# Patient Record
Sex: MALE
[De-identification: names, ages, dates, MRNs, and addresses within clinical notes are randomized per-mention and may not be internally consistent; named-entity substitution may affect disease eponyms.]

---

## 2019-08-10 ENCOUNTER — HOSPITAL ENCOUNTER (INPATIENT)
Dept: HOSPITAL 88 - ER | Age: 69
LOS: 3 days | Discharge: HOME | DRG: 670 | End: 2019-08-13
Attending: INTERNAL MEDICINE | Admitting: INTERNAL MEDICINE
Payer: MEDICARE

## 2019-08-10 VITALS — HEIGHT: 64 IN | BODY MASS INDEX: 34.49 KG/M2 | WEIGHT: 202 LBS

## 2019-08-10 DIAGNOSIS — Z90.79: ICD-10-CM

## 2019-08-10 DIAGNOSIS — I89.8: ICD-10-CM

## 2019-08-10 DIAGNOSIS — N30.41: Primary | ICD-10-CM

## 2019-08-10 DIAGNOSIS — I10: ICD-10-CM

## 2019-08-10 DIAGNOSIS — E11.9: ICD-10-CM

## 2019-08-10 DIAGNOSIS — E78.5: ICD-10-CM

## 2019-08-10 DIAGNOSIS — Z85.46: ICD-10-CM

## 2019-08-10 LAB
ALBUMIN SERPL-MCNC: 2.9 G/DL (ref 3.5–5)
ALBUMIN/GLOB SERPL: 0.6 {RATIO} (ref 0.8–2)
ALP SERPL-CCNC: 70 IU/L (ref 40–150)
ALT SERPL-CCNC: 13 IU/L (ref 0–55)
ANION GAP SERPL CALC-SCNC: 17.6 MMOL/L (ref 8–16)
BACTERIA URNS QL MICRO: (no result) /HPF
BASOPHILS # BLD AUTO: 0 10*3/UL (ref 0–0.1)
BASOPHILS NFR BLD AUTO: 0.2 % (ref 0–1)
BILIRUB UR QL: NEGATIVE
BUN SERPL-MCNC: 15 MG/DL (ref 7–26)
BUN/CREAT SERPL: 15 (ref 6–25)
CALCIUM SERPL-MCNC: 9.4 MG/DL (ref 8.4–10.2)
CHLORIDE SERPL-SCNC: 101 MMOL/L (ref 98–107)
CLARITY UR: (no result)
CO2 SERPL-SCNC: 23 MMOL/L (ref 22–29)
COLOR UR: YELLOW
DEPRECATED NEUTROPHILS # BLD AUTO: 6.7 10*3/UL (ref 2.1–6.9)
DEPRECATED RBC URNS MANUAL-ACNC: >50 /HPF (ref 0–5)
EGFRCR SERPLBLD CKD-EPI 2021: > 60 ML/MIN (ref 60–?)
EOSINOPHIL # BLD AUTO: 0.3 10*3/UL (ref 0–0.4)
EOSINOPHIL NFR BLD AUTO: 3.6 % (ref 0–6)
EPI CELLS URNS QL MICRO: (no result) /LPF
ERYTHROCYTE [DISTWIDTH] IN CORD BLOOD: 14.8 % (ref 11.7–14.4)
GLOBULIN PLAS-MCNC: 4.9 G/DL (ref 2.3–3.5)
GLUCOSE SERPLBLD-MCNC: 98 MG/DL (ref 74–118)
HCT VFR BLD AUTO: 34.4 % (ref 38.2–49.6)
HGB BLD-MCNC: 11.1 G/DL (ref 14–18)
KETONES UR QL STRIP.AUTO: NEGATIVE
LEUKOCYTE ESTERASE UR QL STRIP.AUTO: NEGATIVE
LYMPHOCYTES # BLD: 0.7 10*3/UL (ref 1–3.2)
LYMPHOCYTES NFR BLD AUTO: 8 % (ref 18–39.1)
MCH RBC QN AUTO: 27.3 PG (ref 28–32)
MCHC RBC AUTO-ENTMCNC: 32.3 G/DL (ref 31–35)
MCV RBC AUTO: 84.7 FL (ref 81–99)
MONOCYTES # BLD AUTO: 1 10*3/UL (ref 0.2–0.8)
MONOCYTES NFR BLD AUTO: 11.2 % (ref 4.4–11.3)
NEUTS SEG NFR BLD AUTO: 76.3 % (ref 38.7–80)
NITRITE UR QL STRIP.AUTO: NEGATIVE
PLATELET # BLD AUTO: 403 X10E3/UL (ref 140–360)
POTASSIUM SERPL-SCNC: 3.6 MMOL/L (ref 3.5–5.1)
PROT UR QL STRIP.AUTO: (no result)
RBC # BLD AUTO: 4.06 X10E6/UL (ref 4.3–5.7)
SODIUM SERPL-SCNC: 138 MMOL/L (ref 136–145)
SP GR UR STRIP: 1.02 (ref 1.01–1.02)
UROBILINOGEN UR STRIP-MCNC: 0.2 MG/DL (ref 0.2–1)
WBC #/AREA URNS HPF: (no result) /HPF (ref 0–5)

## 2019-08-10 PROCEDURE — 85730 THROMBOPLASTIN TIME PARTIAL: CPT

## 2019-08-10 PROCEDURE — 74450 X-RAY URETHRA/BLADDER: CPT

## 2019-08-10 PROCEDURE — 83735 ASSAY OF MAGNESIUM: CPT

## 2019-08-10 PROCEDURE — 85610 PROTHROMBIN TIME: CPT

## 2019-08-10 PROCEDURE — 74420 UROGRAPHY RTRGR +-KUB: CPT

## 2019-08-10 PROCEDURE — 81001 URINALYSIS AUTO W/SCOPE: CPT

## 2019-08-10 PROCEDURE — 87086 URINE CULTURE/COLONY COUNT: CPT

## 2019-08-10 PROCEDURE — 36415 COLL VENOUS BLD VENIPUNCTURE: CPT

## 2019-08-10 PROCEDURE — 88305 TISSUE EXAM BY PATHOLOGIST: CPT

## 2019-08-10 PROCEDURE — 80048 BASIC METABOLIC PNL TOTAL CA: CPT

## 2019-08-10 PROCEDURE — 82948 REAGENT STRIP/BLOOD GLUCOSE: CPT

## 2019-08-10 PROCEDURE — 99284 EMERGENCY DEPT VISIT MOD MDM: CPT

## 2019-08-10 PROCEDURE — 80053 COMPREHEN METABOLIC PANEL: CPT

## 2019-08-10 PROCEDURE — 74177 CT ABD & PELVIS W/CONTRAST: CPT

## 2019-08-10 PROCEDURE — 85025 COMPLETE CBC W/AUTO DIFF WBC: CPT

## 2019-08-10 PROCEDURE — 88313 SPECIAL STAINS GROUP 2: CPT

## 2019-08-10 NOTE — DIAGNOSTIC IMAGING REPORT
EXAM: CT Abdomen and Pelvis WITH contrast  

INDICATION: Blood in urine. Painful urination.

COMPARISON: None.

TECHNIQUE: Abdomen and pelvis were scanned utilizing a multidetector helical

scanner from the lung base to the pubic symphysis after administration of IV

contrast. Coronal and sagittal reformations were obtained. Routine protocol was

performed. Scan was performed when during portal venous phase.

            IV CONTRAST: 100 cc Isovue-300

            ORAL CONTRAST: Water

            RADIATION DOSE: Total DLP: 712.11 mGy*cm

             Estimated effective dose: (DLP x 0.015 x size factor) mSv

            COMPLICATIONS: None



FINDINGS:



LINES and TUBES: None.



LOWER THORAX:  6 mm groundglass nodule in the right middle lobe on image 1

series 2. Bibasilar dependent atelectasis. 8 mm noncalcified subpleural nodule

in the lingula on images 6 series 2. Minimal lingular atelectasis on image 6

series 2.



HEPATOBILIARY:      No focal hepatic lesions. No biliary ductal dilation. 



GALLBLADDER: No radio-opaque stones or sludge.  No wall thickening.



SPLEEN: No splenomegaly. 



PANCREAS: No focal masses or ductal dilatation.  



ADRENALS: No adrenal nodules    



KIDNEYS/URETERS: Kidneys enhance symmetrically.  No hydronephrosis. 3.6 cm cyst

in the lower pole of the left kidney associated with punctate calcification. 

No stones.



GI TRACT: No abnormal distention, wall thickening, or evidence of bowel

obstruction.       Appendix is normal.



PELVIC ORGANS/BLADDER: There is diffuse asymmetric wall thickening of the

urinary bladder, associated with perivesicular fat stranding, which may reflect

cystitis in the proper clinical setting.



LYMPH NODES: No lymphadenopathy.



VESSELS: There is mild atherosclerotic disease in the aorta and major arterial

branches.



PERITONEUM / RETROPERITONEUM: No free air or fluid.



BONES: Bilateral small fat-containing inguinal hernias.



SOFT TISSUES: There is a low-attenuation fluid collection within the right

iliac is muscle with mild surrounding peripheral enhancement measuring 4.9 x

2.8 cm on image 73 series 2 which may represent an abscess.



IMPRESSION: 

1.  Findings consistent with cystitis in the proper clinical setting. Recommend

follow-up after treatment to document resolution and adnexal the underlying

pathology such as neoplasm.

2.  4.9 cm mildly peripherally enhancing collection within the right iliac is

muscle may represent an abscess. Bursal fluid is felt to be less likely.

3.  Minimally complex cyst in the lower pole of the left knee.



Signed by: Dr. LUIS ARMANDO Cedeno M.D. on 8/10/2019 11:08 PM

## 2019-08-10 NOTE — XMS REPORT
Patient Summary Document

                             Created on: 08/10/2019



ABE JEREZ

External Reference #: [id number]

: 1950

Sex: Male



Demographics







                          Address                   [address] [place]  [address]

 

                          Home Phone                [phone]

 

                          Preferred Language        Unknown

 

                          Marital Status            Unknown

 

                          Latter-day Affiliation     Unknown

 

                          Race                      Unknown

 

                          Ethnic Group              Unknown





Author







                          Author                    [facility]

 

                          Address                   Unknown

 

                          Phone                     Unavailable







Care Team Providers







                    Care Team Member Name    Role                Phone

 

                          Unavailable               Unavailable







Problems

This patient has no known problems.



Allergies, Adverse Reactions, Alerts

This patient has no known allergies or adverse reactions.



Medications

This patient has no known medications.



Results







           Test Description    Test Time    Test Comments    Text Results    Atomic Results    Result

 Comments

 

                NM BONE SCAN WHOLE BODY    2018 14:41:14                    CLINICAL INDICATION:   dx: c61,

 r/o mets, prostate ca, asymptomaticMODALITY:  [employer] dual head gamma 
cameraTECHNIQUE:  25 mCi Tc 99m MDP are injected IV. After a suitable time 
delay, whole body imaging images are obtained.FINDINGS:COMPARISON:   Multi para 
metric prostate MRI.Symmetric bilateral renal function is observed.Periodontal 
uptake is noted within the maxilla and right mandible.There are mild to moderate
arthritic changes seen at the acromioclavicular, glenohumeral, sternoclavicular,
wrist, medial knee compartments bilaterally. Mild to moderate thoracic 
spondylosis is seen. Facet arthritic changes are noted left L4-5 and right L5-S1
facet. Focal uptake is present at the dorsal left calcaneus.No lytic or blastic 
osseous metastasis is observed.IMPRESSION:Negative for evidence of osteoblastic 
metastatic disease.PQRS 147: 3570F

## 2019-08-10 NOTE — XMS REPORT
Clinical Summary

                             Created on: 08/10/2019



Leland Lacy

External Reference #: [id number]

: 1950

Sex: Male



Demographics







                          Address                   [address] [place]  [address]

 

                          Home Phone                [phone]

 

                          Preferred Language        Unknown

 

                          Marital Status            

 

                          Gnosticism Affiliation     Unknown

 

                          Race                      White

 

                          Ethnic Group              /Latin





Author







                          Author                    [facility]

 

                          Organization              [facility]

 

                          Address                   Unknown

 

                          Phone                     Unavailable







Support







                Name            Relationship    Address         Phone

 

                Dania Mendez    [employer]            Unknown         [phone]







Care Team Providers







                    Care Team Member Name    Role                Phone

 

                    Chapincito Naranjo MD    PCP                 [phone]







Allergies

No Known Allergies



Medications







                          End Date                  Status



              Medication     Sig          Dispensed     Refills      Start  



                                         Date  

 

                                                    Active



                     metFORMIN (GLUCOPHAGE)     Take 1,000 mg       0   



                           500 mg tablet             by mouth 2     



                                         (two) times a     



                                         day with     



                                         meals.     

 

                                                    Active



                     lisinopril          Take 40 mg by       0   



                           (PRINIVIL,ZESTRIL) 10 mg     mouth 2 (two)     



                           tablet                    times a day.     

 

                                                    Active



                     carvedilol (COREG) 12.5     Take 6.25 mg        0   



                           MG tablet                 by mouth 2     



                                         (two) times a     



                                         day with     



                                         meals. 1/2     



                                         tablet..     



                                         018. Dose is     



                                         3.125mg 2 x a     



                                         day     

 

                                                    Active



                     atorvastatin (LIPITOR) 10     Take 20 mg by       0   



                           MG tablet                 mouth     



                                         nightly.     

 

                                                    Active



                     aspirin (ECOTRIN) 81 MG     Take 81 mg by       0   



                           enteric coated tablet     mouth daily.     

 

                                                    Active



                 amLODIPine (NORVASC) 10     TOME HELEN        0               /201  



                     mg tablet           TABLETA TODOS       8  



                                         LOS D?AS     

 

                                                    Active



                 JARDIANCE 25 mg tablet     TOME HELEN        0                 



                           TABLETA TODOS             8  



                                         LOS D?AS  EN     



                                         LA MA?ENDER     

 

                                                    Active



                 GLYXAMBI 25-5 mg tablet     Take 1 tablet      0                 



                           by mouth                  8  



                                         daily.     

 

                                                    Active



                     LANSOPRAZOLE ORAL     Take by             0   



                                         mouth.     

 

                          2018                



              acetaminophen-codeine     Take 1 tablet     30 tablet     0              



                     (TYLENOL WITH CODEINE #3)     by mouth            8  



                           300-30 mg per tablet      every 6 (six)     



                                         hours as     



                                         needed for     



                                         moderate pain     



                                         for up to 10     



                                         days.     

 

                          2018                



              docusate sodium (COLACE)     Take 1       60 capsule     0              



                     100 MG capsule      capsule (100        8  



                                         mg total) by     



                                         mouth 2 (two)     



                                         times a day     



                                         for 30 days.     

 

                          2018                



              ciprofloxacin (CIPRO) 500     Take 1 tablet     8 tablet     0              



                     MG tablet           (500 mg             8  



                                         total) by     



                                         mouth 2 (two)     



                                         times a day     



                                         for 4 days.     



                                         Start taking     



                                         the day     



                                         BEFORE your     



                                         visit for     



                                         catheter     



                                         removal.     

 

                          2018                



              levoFLOXacin (LEVAQUIN)     Take 1 tablet     7 tablet     0              



                     500 MG tablet       (500 mg             8  



                                         total) by     



                                         mouth daily     



                                         for 7 days.     







Active Problems







 



                           Problem                   Noted Date

 

 



                           Prostate cancer           2018







Encounters







                          Care Team                 Description



                     Date                Type                Specialty  

 

                                        



Elise Hearn RN                      



                     2019          Telephone           Oncology  

 

                                        



Chandler Dowlign MD                    



                     10/12/2018          Telephone           Urology  

 

                                        



Chandler Dowling MD                   Prostate cancer (HCC) (Primary Dx)



                     10/10/2018          Office Visit        Urology  

 

                                        



Chandler Dowling MD                   Malignant neoplasm of prostate (Primary Dx)



                     2018          Office Visit        Urology  

 

                                        



Chandler Dowling MD                    



                     2018          Telephone           Urology  

 

                                        



Chandler Dowling MD                    



                     2018          Telephone           Urology  

 

                                        



Chandler Dowling MD                   Prostate cancer (Primary Dx)



                     2018          Office Visit        Urology  

 

                                        



Chandler Dowling MD                    



                     2018          Telephone           Urology  

 

                                        



Chandler Dowling MD                    



                     2018          Telephone           Urology  

 

                                        



Janiya Almanzar                          



                     2018          Patient             Quality  



                                         Outreach   

 

                                        



Chandler Dowling MD                    



                     2018          Telephone           Urology  

 

                                        



Chandler Dowling MD                   ROBOTIC ASSISTED LAPAROSCOPIC  PROSTATECTOMY, BILATERAL PELVIC

 LYMPH NODE DISSECTION, URETHROPEXY



                     2018          Surgery             Urology  

 

                                        



Keanu Kurtz APRN                   



                     2018          Anesthesia          Urology  



                                         Event   

 

                                        



Chandler Dowling MD                   Prostate cancer



                     2018          Hospital            General Internal Medicine  



                           -                         Encounter   



                                         2018    



after 2018



Social History







                                        Date



                 Tobacco Use     Types           Packs/Day       Years Used 

 

                                         



                                         Never Smoker    

 

    



                                         Smokeless Tobacco: Never   



                                         Used   









                                        Tobacco Cessation: Counseling Given: No











   



                 Alcohol Use     Drinks/Week     oz/Week         Comments

 

   



                                         No   









 



                           Sex Assigned at Birth     Date Recorded

 

 



                                         Not on file 









                                        Industry



                           Job Start Date            Occupation 

 

                                        Not on file



                           Not on file               Not on file 









                                        Travel End



                           Travel History            Travel Start 

 





                                         No recent travel history available.







Last Filed Vital Signs







                                        Time Taken



                           Vital Sign                Reading 

 

                                        2018 11:09 AM CDT



                           Blood Pressure            152/79 

 

                                        2018 11:09 AM CDT



                           Pulse                     68 

 

                                        2018 11:09 AM CDT



                           Temperature               35.9 C (96.6 F) 

 

                                        2018 11:09 AM CDT



                           Respiratory Rate          20 

 

                                        2018 11:09 AM CDT



                           Oxygen Saturation         94% 

 

                                        -



                           Inhaled Oxygen            - 



                                         Concentration  

 

                                        2018  6:59 AM CDT



                           Weight                    88.5 kg (195 lb 2 oz) 

 

                                        2018  6:59 AM CDT



                           Height                    167.6 cm (5' 6") 

 

                                        2018  6:59 AM CDT



                           Body Mass Index           31.49 







Plan of Treatment







   



                 Health Maintenance     Due Date        Last Done       Comments

 

   



                           COLONOSCOPY SCREENING     2002  

 

   



                           SHINGLES VACCINES (#1)     2002  

 

   



                           65+ PNEUMOCOCCAL VACCINE     2017  



                                         (1 of 2 - PCV13)   

 

   



                           INFLUENZA VACCINE         2019  







Implants







                    Device Identifier    Shelf Expiration Date    Model / Serial / Lot



                 Implanted       Type            Area            Manufactur   



                                         er   

 

                                        2023          [id number] /

 /

                                        [id number]



                 Clip Ligtng Hem-[employer]-Jonny [employer] Aplr     Surgical        N/A: N/A        [employer] Salomon Lg - [id number]     Implants;           CLOSURE   



                     Implanted: Qty: 2 on 2018 by     [employer];          Chandler Oropeza MD     Extenders;     



                                         Surgical     



                                         Wires     

 

                                        2023          [id number] /

 /

                                        [id number]



                 Clip Ligtng Hem[employer]-Jonny [employer] Aplr     Surgical        N/A: N/A        [facility] [id number]     Implants;           CLOSURE   



                     Implanted: Qty: 1 on 2018 by     [employer];          Chandler Oropeza MD     Extenders;     



                                         Surgical     



                                         Wires     

 

                                        2023          [id number] /

 /

                                        [id number]



                 Clip Ligtng Hem[employer]-Jon[employer] Aplr     Surgical        N/A: N/A        LORENA Salomon Nimesh - [id number]     Implants;           CLOSURE   



                     Implanted: Qty: 1 on 2018 by     [employer];          Chandler Oropeza MD     Extenders;     



                                         Surgical     



                                         Wires     

 

                                        2023          [id number] /

 /

                                        [id number]



                 Clip Ligtng Hem[employer]-Jon[employer] Aplr     Surgical        N/A: N/A        [employer] Salomon Lg - [id number]     Implants;           CLOSURE   



                     Implanted: Qty: 2 on 2018 by     [employer];          Chandler Oropeza MD     Extenders;     



                                         Surgical     



                                         Wires     

 

                                        2023          [id number] /

 /

                                        [id number]



                 Clip Ligtng Hem-[employer]-Jonny [employer] Aplr     Surgical        N/A: N/A        [employer]ORE[employer] Tobar Lg - [id number]     Implants;           CLOSURE   



                     Implanted: Qty: 1 on 2018 by     [employer];          SYSTEMS   



                           Chandler Dowling MD     Extenders;     



                                         Surgical     



                                         Wires     







Procedures







                                        Comments



                 Procedure Name     Priority        Date/Time       Associated Diagnosis 

 

                                        



Results for this procedure are in the results section.



                 URINE CULTURE     Routine         2018      Malignant neoplasm of 



                           11:52 AM CDT              prostate 

 

                                        



Results for this procedure are in the results section.



                 [id number]         Routine         2018      Malignant neoplasm of 



                           11:34 AM CDT              prostate 

 

                                        



Results for this procedure are in the results section.



                     POC GLUCOSE         Routine             2018  



                                         12:13 PM CDT  

 

                                        



Results for this procedure are in the results section.



                     CREATININE LEVEL, MISC     Routine             2018  



                           FLUID                     7:40 AM CDT  

 

                                        



Results for this procedure are in the results section.



                     POC GLUCOSE         Routine             2018  



                                         7:28 AM CDT  

 

                                        



Results for this procedure are in the results section.



                     BASIC METABOLIC PANEL     Routine             2018  



                                         4:00 AM CDT  

 

                                        



Results for this procedure are in the results section.



                     ZZESTIMATED GFR     Routine             2018  



                                         4:00 AM CDT  

 

                                        



Results for this procedure are in the results section.



                     POC GLUCOSE         Routine             2018  



                                         9:26 PM CDT  

 

                                        



Results for this procedure are in the results section.



                     POC GLUCOSE         Routine             2018  



                                         5:36 PM CDT  

 

                                        



Results for this procedure are in the results section.



                     POC GLUCOSE         Routine             2018  



                                         11:52 AM CDT  

 

                                        



Results for this procedure are in the results section.



                     POC GLUCOSE         Routine             2018  



                                         7:28 AM CDT  

 

                                        



Results for this procedure are in the results section.



                     CBC HEMOGRAM        Routine             2018  



                                         4:30 AM CDT  

 

                                        



Results for this procedure are in the results section.



                     ZZESTIMATED GFR     Routine             2018  



                                         4:00 AM CDT  

 

                                        



Results for this procedure are in the results section.



                     BASIC METABOLIC PANEL     Routine             2018  



                                         4:00 AM CDT  

 

                                        



Results for this procedure are in the results section.



                     POC GLUCOSE         Routine             2018  



                                         9:35 PM CDT  

 

                                        



Results for this procedure are in the results section.



                     POC GLUCOSE         Routine             2018  



                                         5:35 PM CDT  

 

                                        



Results for this procedure are in the results section.



                     POC GLUCOSE         Routine             2018  



                                         11:28 AM CDT  

 

                                        



Results for this procedure are in the results section.



                     POC GLUCOSE         Routine             2018  



                                         8:00 AM CDT  

 

                                        



Results for this procedure are in the results section.



                     ZZESTIMATED GFR     Routine             2018  



                                         4:00 AM CDT  

 

                                        



Results for this procedure are in the results section.



                     BASIC METABOLIC PANEL     Routine             2018  



                                         4:00 AM CDT  

 

                                        



Results for this procedure are in the results section.



                     HEMOGLOBIN & HEMATOCRIT     Routine             2018  



                                         3:45 AM CDT  

 

                                        



Results for this procedure are in the results section.



                     POC GLUCOSE         Routine             2018  



                                         8:34 PM CDT  

 

                                        



Results for this procedure are in the results section.



                     HC COMPLETE BLD COUNT     Routine             2018  



                           W/AUTO DIFF               1:42 PM CDT  

 

                                        



Results for this procedure are in the results section.



                     POC GLUCOSE         Routine             2018  



                                         1:17 PM CDT  

 

                                        



Results for this procedure are in the results section.



                     ZZESTIMATED GFR     Routine             2018  



                                         1:01 PM CDT  

 

                                        



Results for this procedure are in the results section.



                     BASIC METABOLIC PANEL     Routine             2018  



                                         1:01 PM CDT  

 

                                        



Results for this procedure are in the results section.



                     SURGICAL PATHOLOGY     Routine             2018  



                           REQUEST                   9:31 AM CDT  

 

                                        



Results for this procedure are in the results section.



                     SURGICAL PATHOLOGY     Routine             2018  



                           REQUEST                   9:31 AM CDT  

 

                                         



                     WA AN ELECTIVE      Routine             2018  



                           ENDOTRACHEAL AIRWAY       8:25 AM CDT  

 

  



                                         Procedure



                                         Note -



                                         Gisell Benton -



                                         2018



                                         8:25 AM



                                         CDT



                                         Airway



                                         Date/Time:



                                         2018



                                         7:29 AM



                                         Performed



                                         by: GISELL BENTON



                                         Authorized



                                         by:



                                         DIMA DURAN





                                         Location:



                                         OR



                                         Urgency:



                                         Elective



                                         Difficult



                                         Airway: No



                                         Resident/C



                                         RNA/AA:



                                         GISELL BENTON



                                         Preoxygena



                                         erna with



                                         100% O2:



                                         Yes



                                         C-spine



                                         Precaution



                                         s



                                         Maintained



                                         Throughout



                                         : Yes



                                         Mask



                                         Ventilatio



                                         n:  Easy



                                         mask



                                         Final



                                         Airway



                                         Type:



                                         Endotrache



                                         al airway



                                         Final



                                         Endotrache



                                         al Airway:



                                         ETT



                                         Cuffed:



                                         Yes



                                         Technique



                                         Used:



                                         Direct



                                         laryngosco



                                         py



                                         Devices/Me



                                         thods Used



                                         in



                                         Placement:



                                         Intubatin



                                         g stylet



                                         Insertion



                                         Site:



                                         Oral



                                         Blade



                                         Type:



                                         [place]itt



                                         Laryngosco



                                         pe



                                         Blade/Vide



                                         olaryngosc



                                         ope Blade



                                         Size:  2



                                         ETT Size



                                         (mm):  8.0



                                         Cuff at



                                         minimum



                                         occlusion



                                         pressure:



                                         Yes



                                         Measured



                                         from:



                                         Gums



                                         ETT to



                                         Gums (cm):



                                         23



                                         Placement



                                         Verified



                                         by: CO2



                                         detection,



                                         direct



                                         visualizat



                                         ion and



                                         equal



                                         breath



                                         sounds



                                         Laryngosco



                                         pic view:



                                         Grade I -



                                         full view



                                         of glottis



                                         Rapid



                                         Sequence



                                         Induction



                                         (RSI): No





                                         Modified



                                         RSI: No



                                         Number of



                                         Attempts



                                         at



                                         Approach:



                                         1



                                         AURTRAUMA



                                         TIC  RUC



                                         CHIPPED



                                         PRIOR



                                         ARRIVAL



                                         OR   LUBE



                                         & TAPE



                                         BOTH  EYES



                                         OGT



 

                                         



                     PROSTATECTOMY,      2018          Prostate cancer 



                           LAPAROSCOPIC,             7:30 AM CDT  



                                         ROBOT-ASSISTED    

 

  



                                         Case Notes



                                         **DAVINCI*



                                         *,



                                         POSSIBLE



                                         EXTENDED



                                         RECOVERY

 

  



                                         Special



                                         Needs



                                         **DAVINCI*



                                         *,



                                         POSSIBLE



                                         EXTENDED



                                         RECOVERY

 

                                        



Results for this procedure are in the results section.



                     POC GLUCOSE         Routine             2018  



                                         6:21 AM CDT  



after 2018



Results

* Urine culture (2018 11:52 AM CDT)





    



              Component     Value        Ref Range     Performed At     Pathologist



                                         Signature

 

    



                     Urine culture       No growth           [facility] 













                                         Specimen

 





                                         Urine









 



                           Narrative                 Performed At

 

 



                           Performed at:[address] [place] [address]     [facility]



                                         [address] [place], [place] [id number] 



                                         : Jose Antonio Cordero MD, Phone:[phone] 









   



                 Performing Organization     Address         City/[facility]/Zipcode     Phone Number

 

   



                                         [facility]   





* POC BLADDER SCAN/PVR (2018 11:34 AM CDT)









                                         Specimen

 





                                         Urine









 



                           Impressions               Performed At

 

 



                                         0 ml 





* POC glucose (2018 12:13 PM CDT)



Only the most recent of 13 results within the time period is included.





    



              Component     Value        Ref Range     Performed At     Pathologist



                                         Signature

 

    



                 POC glucose     105 (H)         65 - 99 mg/dL     [facility] DEPARTMENT 



                           Comment:                  OF PATHOLOGY 



                           No Action Needed          AND GENOMIC 



                           Meter ID: [id number]      MEDICINE 



                                         : Imelda GHOTRA   













                                         Specimen

 











   



                 Performing Organization     Address         City/State/Zipcode     Phone Number

 

   



                     [facility] DEPARTMENT OF     [address] [place] [id number] 



                                         PATHOLOGY AND GENOMIC   



                                         MEDICINE   





* Creatinine level, misc fluid (2018  7:40 AM CDT)





    



              Component     Value        Ref Range     Performed At     Pathologist



                                         [facility]

 

    



                     Fluid type          Peritoneal          [facility] DEPARTMENT 



                                         OF PATHOLOGY 



                                         AND GENOMIC 



                                         MEDICINE 

 

    



                 Creatinine,     0.7             mg/dL           [facility] DEPARTMENT 



                     fluid               Comment:            OF PATHOLOGY 



                           Analysis performed on [employer]      AND [email or web address] 



                           8000 analyzer. This is not an      MEDICINE 



                                         approved methodology for this   



                                         specimen type;accuracy and

   



                                         clinical significance   



                                         uncertain.   













                                         Specimen

 





                                         Fluid









   



                 Performing Organization     Address         City/[facility]/Zipcode     Phone Number

 

   



                     [place] [address] 



                                         PATHOLOGY AND GENOMIC   



                                         MEDICINE   





* Estimated GFR (2018  4:00 AM CDT)



Only the most recent of 4 results within the time period is included.





    



              Component     Value        Ref Range     Performed At     Pathologist



                                         Signature

 

    



                 GFR Non Af Amer     >90             mL/min/1.73 m2     [facility] DEPARTMENT 



                                         OF PATHOLOGY 



                                         AND GENOMIC 



                                         MEDICINE 

 

    



                 GFR Af Amer     >90             mL/min/1.73 m2     [facility] DEPARTMENT 



                           Comment:                  OF PATHOLOGY 



                           Chronic kidney disease: <60      AND GENOMIC 



                           mL/min/1.73m2             MEDICINE 



                                         Kidney failure: <15   



                                         mL/min/1.73m2   



                                         The estimated GFR is   



                                         calculated from the   



                                         IDMS-traceable Modification of   



                                         Diet   



                                         in Renal Disease Equation. The   



                                         accuracy of the calculation is   



                                         poor when the   



                                         creatinine is normal.   



                                         Calculated values >90   



                                         mL/min/1.73m2 are not   



                                         reported.   



                                         This equation has not been   



                                         validated in children (<18   



                                         years), pregnant   



                                         women, the elderly (>70   



                                         years), or ethnic groups other   



                                         than Caucasians and   



                                          Americans.   













                                         Specimen

 





                                         Plasma specimen









   



                 Performing Organization     Address         City/State/Zipcode     Phone Number

 

   



                     [place] [address] 



                                         PATHOLOGY AND [email or web address]   



                                         MEDICINE   





* Basic metabolic panel (2018  4:00 AM CDT)



Only the most recent of 4 results within the time period is included.





    



              Component     Value        Ref Range     Performed At     Pathologist



                                         [facility]

 

    



                 Sodium          142             135 - 148 mEq/L     [facility] DEPARTMENT 



                                         OF PATHOLOGY 



                                         AND GENOMIC 



                                         MEDICINE 

 

    



                 Potassium       4.4             3.5 - 5.0 mEq/L     [facility] DEPARTMENT 



                                         OF PATHOLOGY 



                                         AND GENOMIC 



                                         MEDICINE 

 

    



                 Chloride        105             98 - 112 mEq/L     [facility] DEPARTMENT 



                                         OF PATHOLOGY 



                                         AND GENOMIC 



                                         MEDICINE 

 

    



                 CO2             19 (L)          24 - 31 mEq/L     [facility] DEPARTMENT 



                                         OF PATHOLOGY 



                                         AND GENOMIC 



                                         MEDICINE 

 

    



                 Anion gap       18@ANIO (H)     7 - 15 mEq/L     [facility] DEPARTMENT 



                                         OF PATHOLOGY 



                                         AND GENOMIC 



                                         MEDICINE 

 

    



                 BUN             16              8 - 23 mg/dL     [facility] DEPARTMENT 



                                         OF PATHOLOGY 



                                         AND GENOMIC 



                                         MEDICINE 

 

    



                 Creatinine      0.8             0.7 - 1.2 mg/dL     [facility] DEPARTMENT 



                                         OF PATHOLOGY 



                                         AND GENOMIC 



                                         MEDICINE 

 

    



                 Glucose         74              65 - 99 mg/dL     [facility] DEPARTMENT 



                                         OF PATHOLOGY 



                                         AND GENOMIC 



                                         MEDICINE 

 

    



                 Calcium         8.3 (L)         8.8 - 10.2 mg/dL     [facility] DEPARTMENT 



                                         OF PATHOLOGY 



                                         AND GENOMIC 



                                         MEDICINE 













                                         Specimen

 





                                         Plasma specimen









   



                 Performing Organization     Address         City/[facility]/Zipcode     Phone Number

 

   



                     [place] [address] 



                                         PATHOLOGY AND GENOMIC   



                                         MEDICINE   





* CBC hemogram (2018  4:30 AM CDT)





    



              Component     Value        Ref Range     Performed At     Pathologist



                                         Signature

 

    



                 WBC             8.54            4.50 - 11.00 k/uL     [facility] DEPARTMENT 



                                         OF PATHOLOGY 



                                         AND GENOMIC 



                                         MEDICINE 

 

    



                 RBC             4.30 (L)        4.40 - 6.00 m/uL     [facility] DEPARTMENT 



                                         OF PATHOLOGY 



                                         AND GENOMIC 



                                         MEDICINE 

 

    



                 HGB             12.2 (L)        14.0 - 18.0 g/dL     [facility] DEPARTMENT 



                                         OF PATHOLOGY 



                                         AND GENOMIC 



                                         MEDICINE 

 

    



                 HCT             40.6 (L)        41.0 - 51.0 %     [facility] DEPARTMENT 



                                         OF PATHOLOGY 



                                         AND GENOMIC 



                                         MEDICINE 

 

    



                 MCV             94.4            82.0 - 100.0 fL     [facility] DEPARTMENT 



                                         OF PATHOLOGY 



                                         AND GENOMIC 



                                         MEDICINE 

 

    



                 MCH             28.4            27.0 - 34.0 pg     [facility] DEPARTMENT 



                                         OF PATHOLOGY 



                                         AND GENOMIC 



                                         MEDICINE 

 

    



                 MCHC            30.0 (L)        31.0 - 37.0 g/dL     [facility] DEPARTMENT 



                                         OF PATHOLOGY 



                                         AND GENOMIC 



                                         MEDICINE 

 

    



                 RDW - SD        49.7            37.0 - 55.0 fL     [facility] DEPARTMENT 



                                         OF PATHOLOGY 



                                         AND GENOMIC 



                                         MEDICINE 

 

    



                 MPV             11.8            8.8 - 13.2 fL     [facility] DEPARTMENT 



                                         OF PATHOLOGY 



                                         AND GENOMIC 



                                         MEDICINE 

 

    



                 Platelet count     156             150 - 400 k/uL     [facility] DEPARTMENT 



                                         OF PATHOLOGY 



                                         AND GENOMIC 



                                         MEDICINE 

 

    



                 Nucleated RBC     0.00            /100 WBC        [facility] DEPARTMENT 



                                         OF PATHOLOGY 



                                         AND GENOMIC 



                                         MEDICINE 













                                         Specimen

 





                                         Blood









   



                 Performing Organization     Address         City/[facility]     Phone Number

 

   



                     [facility] [place] [address] 



                                         PATHOLOGY AND GENOMIC   



                                         MEDICINE   





* Hemoglobin & hematocrit (2018  3:45 AM CDT)





    



              Component     Value        Ref Range     Performed At     Pathologist



                                         Signature

 

    



                 HGB             12.2 (L)        14.0 - 18.0 g/dL     [facility] DEPARTMENT 



                                         OF PATHOLOGY 



                                         AND GENOMIC 



                                         MEDICINE 

 

    



                 HCT             39.9 (L)        41.0 - 51.0 %     [facility] DEPARTMENT 



                                         OF PATHOLOGY 



                                         AND GENOMIC 



                                         MEDICINE 













                                         Specimen

 





                                         Blood









   



                 Performing Organization     Address         City/[facility]/Zipcode     Phone Number

 

   



                     [place] [address] 



                                         PATHOLOGY AND GENOMIC   



                                         MEDICINE   





* CBC with platelet and differential (2018  1:42 PM CDT)





    



              Component     Value        Ref Range     Performed At     Pathologist



                                         Signature

 

    



                 WBC             11.82 (H)       4.50 - 11.00 k/uL     [facility] DEPARTMENT 



                                         OF PATHOLOGY 



                                         AND GENOMIC 



                                         MEDICINE 

 

    



                 RBC             4.81            4.40 - 6.00 m/uL     [facility] DEPARTMENT 



                                         OF PATHOLOGY 



                                         AND GENOMIC 



                                         MEDICINE 

 

    



                 HGB             13.6 (L)        14.0 - 18.0 g/dL     [facility] DEPARTMENT 



                                         OF PATHOLOGY 



                                         AND GENOMIC 



                                         MEDICINE 

 

    



                 HCT             43.8            41.0 - 51.0 %     [facility] DEPARTMENT 



                                         OF PATHOLOGY 



                                         AND GENOMIC 



                                         MEDICINE 

 

    



                 MCV             91.1            82.0 - 100.0 fL     [facility] DEPARTMENT 



                                         OF PATHOLOGY 



                                         AND GENOMIC 



                                         MEDICINE 

 

    



                 MCH             28.3            27.0 - 34.0 pg     [facility] DEPARTMENT 



                                         OF PATHOLOGY 



                                         AND GENOMIC 



                                         MEDICINE 

 

    



                 MCHC            31.1            31.0 - 37.0 g/dL     [facility] DEPARTMENT 



                                         OF PATHOLOGY 



                                         AND GENOMIC 



                                         MEDICINE 

 

    



                 RDW - SD        46.9            37.0 - 55.0 fL     [facility] DEPARTMENT 



                                         OF PATHOLOGY 



                                         AND GENOMIC 



                                         MEDICINE 

 

    



                 MPV             11.7            8.8 - 13.2 fL     [facility] DEPARTMENT 



                                         OF PATHOLOGY 



                                         AND GENOMIC 



                                         MEDICINE 

 

    



                 Platelet count     241             150 - 400 k/uL     [facility] DEPARTMENT 



                                         OF PATHOLOGY 



                                         AND GENOMIC 



                                         MEDICINE 

 

    



                 Nucleated RBC     0.20            /100 WBC        [facility] DEPARTMENT 



                                         OF PATHOLOGY 



                                         AND GENOMIC 



                                         MEDICINE 

 

    



                 Neutrophils     82.5 (H)        39.0 - 69.0 %     [facility] DEPARTMENT 



                                         OF PATHOLOGY 



                                         AND GENOMIC 



                                         MEDICINE 

 

    



                 Lymphocytes     9.9 (L)         25.0 - 45.0 %     [facility] DEPARTMENT 



                                         OF PATHOLOGY 



                                         AND GENOMIC 



                                         MEDICINE 

 

    



                 Monocytes       6.5             0.0 - 10.0 %     [facility] DEPARTMENT 



                                         OF PATHOLOGY 



                                         AND GENOMIC 



                                         MEDICINE 

 

    



                 Eosinophils     0.6             0.0 - 5.0 %     [facility] DEPARTMENT 



                                         OF PATHOLOGY 



                                         AND GENOMIC 



                                         MEDICINE 

 

    



                 Basophils       0.2             0.0 - 1.0 %     [facility] DEPARTMENT 



                                         OF PATHOLOGY 



                                         AND GENOMIC 



                                         MEDICINE 

 

    



                 Immature        0.3Comment: "Immature     0.0 - 1.0 %     [facility] DEPARTMENT 



                     granulocytes        granulocytes"  (promyelocytes,      OF PATHOLOGY 



                           myelocytes, metamyelocytes)      AND GENOMIC 



                                         MEDICINE 













                                         Specimen

 





                                         Blood









   



                 Performing Organization     Address         City/State/Zipcode     Phone Number

 

   



                     [facility] DEPARTMENT OF     [address] [place] [address] 



                                         PATHOLOGY AND GENOMIC   



                                         MEDICINE   





* Surgical pathology request (2018  9:31 AM CDT)



Only the most recent of 2 results within the time period is included.





    



              Component     Value        Ref Range     Performed At     Pathologist



                                         Signature

 

    



                     Case number         [id number]        [facility] DEPARTMENT 



                                         OF PATHOLOGY 



                                         AND GENOMIC 



                                         MEDICINE 

 

    



                     Surgical            See link below for PDF Lab      [facility] DEPARTMENT 



                     pathology           Report              OF PATHOLOGY 



                           report                    AND GENOMIC 



                                         MEDICINE 

 

    



                     Result status       This is Supplemental Report      [facility] DEPARTMENT 



                           for [id number]          OF PATHOLOGY 



                                         AND GENOMIC 



                                         MEDICINE 













                                         Specimen

 











   



                 Performing Organization     Address         City/State/Zipcode     Phone Number

 

   



                     [facility] DEPARTMENT OF     [address] [place] [address] 



                                         PATHOLOGY AND GENOMIC   



                                         MEDICINE   





after 2018



Insurance







                                        Type



            Payer      Benefit     Subscriber ID     Effective     Phone      Address 



                           Plan /                    Dates   



                                         Group     

 

                                        PPO



                 BCBS            BCBS            xxxxxxxxxxxx     2017-P   



                           CHOICE                    resent   



                                         PPO/[employer] PPO     









     



            Guarantor Name     Account     Relation to     Date of     Phone      Billing Address



                     Type                Patient             Birth  

 

     



            Leland Lacy     Personal/F     Self       1950     [phone] [address]               ([place])              [place] [address]







Advance Directives





Patient has advance care planning documents on file. For more information, jethro amdison contact:



Demar Cartagena



[address] [place] [address]

## 2019-08-11 VITALS — SYSTOLIC BLOOD PRESSURE: 143 MMHG | DIASTOLIC BLOOD PRESSURE: 81 MMHG

## 2019-08-11 VITALS — DIASTOLIC BLOOD PRESSURE: 99 MMHG | SYSTOLIC BLOOD PRESSURE: 157 MMHG

## 2019-08-11 VITALS — SYSTOLIC BLOOD PRESSURE: 140 MMHG | DIASTOLIC BLOOD PRESSURE: 84 MMHG

## 2019-08-11 VITALS — DIASTOLIC BLOOD PRESSURE: 78 MMHG | SYSTOLIC BLOOD PRESSURE: 131 MMHG

## 2019-08-11 VITALS — DIASTOLIC BLOOD PRESSURE: 81 MMHG | SYSTOLIC BLOOD PRESSURE: 143 MMHG

## 2019-08-11 VITALS — DIASTOLIC BLOOD PRESSURE: 87 MMHG | SYSTOLIC BLOOD PRESSURE: 129 MMHG

## 2019-08-11 VITALS — SYSTOLIC BLOOD PRESSURE: 157 MMHG | DIASTOLIC BLOOD PRESSURE: 99 MMHG

## 2019-08-11 VITALS — SYSTOLIC BLOOD PRESSURE: 133 MMHG | DIASTOLIC BLOOD PRESSURE: 81 MMHG

## 2019-08-11 LAB
ALBUMIN SERPL-MCNC: 2.4 G/DL (ref 3.5–5)
ALBUMIN/GLOB SERPL: 0.6 {RATIO} (ref 0.8–2)
ALP SERPL-CCNC: 61 IU/L (ref 40–150)
ALT SERPL-CCNC: 11 IU/L (ref 0–55)
ANION GAP SERPL CALC-SCNC: 13.1 MMOL/L (ref 8–16)
BASOPHILS # BLD AUTO: 0 10*3/UL (ref 0–0.1)
BASOPHILS NFR BLD AUTO: 0.4 % (ref 0–1)
BUN SERPL-MCNC: 13 MG/DL (ref 7–26)
BUN/CREAT SERPL: 15 (ref 6–25)
CALCIUM SERPL-MCNC: 8.6 MG/DL (ref 8.4–10.2)
CHLORIDE SERPL-SCNC: 107 MMOL/L (ref 98–107)
CO2 SERPL-SCNC: 24 MMOL/L (ref 22–29)
DEPRECATED NEUTROPHILS # BLD AUTO: 5.3 10*3/UL (ref 2.1–6.9)
EGFRCR SERPLBLD CKD-EPI 2021: > 60 ML/MIN (ref 60–?)
EOSINOPHIL # BLD AUTO: 0.3 10*3/UL (ref 0–0.4)
EOSINOPHIL NFR BLD AUTO: 3.8 % (ref 0–6)
ERYTHROCYTE [DISTWIDTH] IN CORD BLOOD: 15 % (ref 11.7–14.4)
GLOBULIN PLAS-MCNC: 4.2 G/DL (ref 2.3–3.5)
GLUCOSE SERPLBLD-MCNC: 144 MG/DL (ref 74–118)
HCT VFR BLD AUTO: 31.9 % (ref 38.2–49.6)
HGB BLD-MCNC: 9.8 G/DL (ref 14–18)
LYMPHOCYTES # BLD: 0.7 10*3/UL (ref 1–3.2)
LYMPHOCYTES NFR BLD AUTO: 9.2 % (ref 18–39.1)
MCH RBC QN AUTO: 26.7 PG (ref 28–32)
MCHC RBC AUTO-ENTMCNC: 30.7 G/DL (ref 31–35)
MCV RBC AUTO: 86.9 FL (ref 81–99)
MONOCYTES # BLD AUTO: 0.8 10*3/UL (ref 0.2–0.8)
MONOCYTES NFR BLD AUTO: 11.2 % (ref 4.4–11.3)
NEUTS SEG NFR BLD AUTO: 74.8 % (ref 38.7–80)
PLATELET # BLD AUTO: 328 X10E3/UL (ref 140–360)
POTASSIUM SERPL-SCNC: 3.1 MMOL/L (ref 3.5–5.1)
RBC # BLD AUTO: 3.67 X10E6/UL (ref 4.3–5.7)
SODIUM SERPL-SCNC: 141 MMOL/L (ref 136–145)

## 2019-08-11 RX ADMIN — GLIMEPIRIDE SCH MG: 2 TABLET ORAL at 12:55

## 2019-08-11 RX ADMIN — ATORVASTATIN CALCIUM SCH MG: 10 TABLET, FILM COATED ORAL at 19:58

## 2019-08-11 RX ADMIN — INSULIN LISPRO SCH UNIT: 100 INJECTION, SOLUTION INTRAVENOUS; SUBCUTANEOUS at 20:05

## 2019-08-11 RX ADMIN — LISINOPRIL SCH MG: 20 TABLET ORAL at 12:56

## 2019-08-11 RX ADMIN — INSULIN LISPRO SCH UNIT: 100 INJECTION, SOLUTION INTRAVENOUS; SUBCUTANEOUS at 16:30

## 2019-08-11 RX ADMIN — CARVEDILOL SCH MG: 12.5 TABLET, FILM COATED ORAL at 17:33

## 2019-08-11 RX ADMIN — CEFEPIME HYDROCHLORIDE SCH MLS/HR: 1 INJECTION, POWDER, FOR SOLUTION INTRAMUSCULAR; INTRAVENOUS at 23:22

## 2019-08-11 RX ADMIN — AMLODIPINE BESYLATE SCH MG: 10 TABLET ORAL at 12:56

## 2019-08-11 RX ADMIN — INSULIN LISPRO SCH UNIT: 100 INJECTION, SOLUTION INTRAVENOUS; SUBCUTANEOUS at 11:30

## 2019-08-11 RX ADMIN — Medication PRN MG: at 19:58

## 2019-08-11 RX ADMIN — CEFEPIME HYDROCHLORIDE SCH MLS/HR: 1 INJECTION, POWDER, FOR SOLUTION INTRAMUSCULAR; INTRAVENOUS at 12:57

## 2019-08-11 RX ADMIN — SODIUM CHLORIDE SCH MLS/HR: 9 INJECTION, SOLUTION INTRAVENOUS at 17:34

## 2019-08-11 RX ADMIN — Medication SCH TAB: at 17:33

## 2019-08-11 RX ADMIN — SODIUM CHLORIDE SCH MLS/HR: 9 INJECTION, SOLUTION INTRAVENOUS at 09:06

## 2019-08-11 RX ADMIN — SODIUM CHLORIDE SCH MLS/HR: 9 INJECTION, SOLUTION INTRAVENOUS at 00:04

## 2019-08-11 RX ADMIN — GLIMEPIRIDE SCH MG: 2 TABLET ORAL at 23:22

## 2019-08-11 RX ADMIN — CEFEPIME HYDROCHLORIDE SCH MLS/HR: 1 INJECTION, POWDER, FOR SOLUTION INTRAMUSCULAR; INTRAVENOUS at 17:33

## 2019-08-11 RX ADMIN — CARVEDILOL SCH MG: 12.5 TABLET, FILM COATED ORAL at 12:55

## 2019-08-11 RX ADMIN — SODIUM CHLORIDE SCH MLS/HR: 9 INJECTION, SOLUTION INTRAVENOUS at 19:39

## 2019-08-11 NOTE — NUR
Patient came onto the floor from ER for right groin abscess and painful urination. The 
patient is Czech speaking which the tech translated for the RN. The patient is A/Ox3 with 
pain over the right groin at 5/10. Wheels lock, bed height low, call light within reach, and 
side rails up x2. Dr. Shelton has been contacted as admitting physician.

## 2019-08-11 NOTE — CONSULTATION
DATE OF CONSULTATION:  08/11/2019  

 

Dr. Vinh Smith dictating a consultation for Dr. Shelton.

 

HISTORY:  This is a 69-year-old male, who comes to the hospital because of pain in the

right lower abdomen and groin.  He also has been told that he had a urinary tract

infection.  His symptomatology for that was frequency, urgency, dysuria, particularly at

the end of the penis and he had seen some bloody discoloration in the urine at the end

of micturition.  He was given antibiotic, nitrofurantoin which he took at home.  So, he

comes to the hospital, where a CT scan is done and he is admitted with the impression of

an abscess of the right groin.  He also has severe microscopic hematuria, although the

patient had not seen any blood.  There is over 50 red blood cells in the urine itself. 

 

MEDICATIONS:  Amlodipine 10 mg daily, atorvastatin 10 mg at bedtime, carvedilol 6.25 mg

twice daily, glimepiride 2 mg every 12 hours, lansoprazole 30 mg daily, lisinopril 40 mg

daily, metformin 1000 mg twice daily and Januvia 100 mg daily.  He also takes

lactobacillus acidophilus. 

 

PAST UROLOGICAL HISTORY:  He had approximately a year and a half ago a radical robotic

prostatectomy with bilateral lymph node dissection followed by 38 treatments of

radiation in the pelvis.  He does not offer any other surgery. 

 

SOCIAL HISTORY:  He is .  He is not sexually active.

 

ALLERGIES:  HE HAS NO KNOWN DRUG ALLERGIES.

 

PHYSICAL EXAMINATION:

The penis is normal.  Testicles are normal.  There are no inguinal hernias.  There is

pain on the femoral canal going up into the right groin and stopping at the superior

anterior iliac spine on the right side.  Prostatic fossa is empty. 

IMAGING:  On the CT scan, the patient has a right-sided lymphocele that is a result of

the operation that occurs with lymphadenectomy and the treatment is to drain the

lymphocele into the abdomen.  The patient has bilateral cyst upper pole right small and

peripelvic lower pole larger at 3.5, 3.7 cm with a 1 mm stone in the lower pole

infundibulum-calyx.  The patient has an enlarged liver and that his abdominal CT is

normal.  The bladder is severely thickened and there is perivesical inflammation as well

as perirectal inflammation.  The patient has had pretty much symptomatology due to the

radiation cystitis. 

 

IMPRESSION:  

1. Radiation cystitis.

2. Lymphocele.

 

RECOMMENDATIONS:  Cystoscopy is recommended.  The patient has the complaint of frequency

during the daytime, but also nocturia x5 to 6.  He also notices that his flow is slow

and sometimes he is just being dribbling lately.  After the antibiotics, he says that

his flow has gotten a little better and it does not burn as much.  I would like to do a

cystoscopy, dilatation of any bladder neck contracture that the patient has.  A biopsy

of the bladder with full duration.  Of course, we will be sending all this records to

[facility], so he can be seen by the urologist. 

If I have to dilate the bladder neck, I will have to leave a [place] catheter in place for

3 to 5 days.  The patient does not have to be in the hospital that long. 

 

 

 

 

______________________________

MD GAGAN Welch/MODL

D:  08/11/2019 12:12:47

T:  08/11/2019 13:04:00

Job #:  [id number]

## 2019-08-11 NOTE — XMS REPORT
Clinical Summary

                             Created on: 2019



Leland Lacy

External Reference #: [id number]

: 1950

Sex: Male



Demographics







                          Address                   [address] [place]  [address]

 

                          Home Phone                [phone]

 

                          Preferred Language        Unknown

 

                          Marital Status            

 

                          Orthodox Affiliation     Unknown

 

                          Race                      White

 

                          Ethnic Group              /Latin





Author







                          Author                    [facility]

 

                          Organization              [facility]

 

                          Address                   Unknown

 

                          Phone                     Unavailable







Support







                Name            Relationship    Address         Phone

 

                Dania Mendez    [employer]            Unknown         [phone]







Care Team Providers







                    Care Team Member Name    Role                Phone

 

                    Chapincito Naranjo MD    PCP                 [phone]







Allergies

No Known Allergies



Medications







                          End Date                  Status



              Medication     Sig          Dispensed     Refills      Start  



                                         Date  

 

                                                    Active



                     metFORMIN (GLUCOPHAGE)     Take 1,000 mg       0   



                           500 mg tablet             by mouth 2     



                                         (two) times a     



                                         day with     



                                         meals.     

 

                                                    Active



                     lisinopril          Take 40 mg by       0   



                           (PRINIVIL,ZESTRIL) 10 mg     mouth 2 (two)     



                           tablet                    times a day.     

 

                                                    Active



                     carvedilol (COREG) 12.5     Take 6.25 mg        0   



                           MG tablet                 by mouth 2     



                                         (two) times a     



                                         day with     



                                         meals. 1/2     



                                         tablet..     



                                         018. Dose is     



                                         3.125mg 2 x a     



                                         day     

 

                                                    Active



                     atorvastatin (LIPITOR) 10     Take 20 mg by       0   



                           MG tablet                 mouth     



                                         nightly.     

 

                                                    Active



                     aspirin (ECOTRIN) 81 MG     Take 81 mg by       0   



                           enteric coated tablet     mouth daily.     

 

                                                    Active



                 amLODIPine (NORVASC) 10     TOME HELEN        0               /201  



                     mg tablet           TABLETA TODOS       8  



                                         LOS D?AS     

 

                                                    Active



                 JARDIANCE 25 mg tablet     TOME HELEN        0                 



                           TABLETA TODOS             8  



                                         LOS D?AS  EN     



                                         LA MA?ENDER     

 

                                                    Active



                 GLYXAMBI 25-5 mg tablet     Take 1 tablet      0                 



                           by mouth                  8  



                                         daily.     

 

                                                    Active



                     LANSOPRAZOLE ORAL     Take by             0   



                                         mouth.     

 

                          2018                



              acetaminophen-codeine     Take 1 tablet     30 tablet     0              



                     (TYLENOL WITH CODEINE #3)     by mouth            8  



                           300-30 mg per tablet      every 6 (six)     



                                         hours as     



                                         needed for     



                                         moderate pain     



                                         for up to 10     



                                         days.     

 

                          2018                



              docusate sodium (COLACE)     Take 1       60 capsule     0              



                     100 MG capsule      capsule (100        8  



                                         mg total) by     



                                         mouth 2 (two)     



                                         times a day     



                                         for 30 days.     

 

                          2018                



              ciprofloxacin (CIPRO) 500     Take 1 tablet     8 tablet     0              



                     MG tablet           (500 mg             8  



                                         total) by     



                                         mouth 2 (two)     



                                         times a day     



                                         for 4 days.     



                                         Start taking     



                                         the day     



                                         BEFORE your     



                                         visit for     



                                         catheter     



                                         removal.     

 

                          2018                



              levoFLOXacin (LEVAQUIN)     Take 1 tablet     7 tablet     0              



                     500 MG tablet       (500 mg             8  



                                         total) by     



                                         mouth daily     



                                         for 7 days.     







Active Problems







 



                           Problem                   Noted Date

 

 



                           Prostate cancer           2018







Encounters







                          Care Team                 Description



                     Date                Type                Specialty  

 

                                        



Elise Hearn RN                      



                     2019          Telephone           Oncology  

 

                                        



Chandler Dowling MD                    



                     10/12/2018          Telephone           Urology  

 

                                        



Chandler Dowling MD                   Prostate cancer (HCC) (Primary Dx)



                     10/10/2018          Office Visit        Urology  

 

                                        



Chandler Dowling MD                   Malignant neoplasm of prostate (Primary Dx)



                     2018          Office Visit        Urology  

 

                                        



Chandler Dowling MD                    



                     2018          Telephone           Urology  

 

                                        



Chandler Dowling MD                    



                     2018          Telephone           Urology  

 

                                        



Chandler Dowling MD                   Prostate cancer (Primary Dx)



                     2018          Office Visit        Urology  

 

                                        



Chandler Dowling MD                    



                     2018          Telephone           Urology  

 

                                        



Chandler Dowling MD                    



                     2018          Telephone           Urology  

 

                                        



Janiya Almanzar                          



                     2018          Patient             Quality  



                                         Outreach   

 

                                        



Chandler Dowling MD                    



                     2018          Telephone           Urology  

 

                                        



Chandler Dowling MD                   ROBOTIC ASSISTED LAPAROSCOPIC  PROSTATECTOMY, BILATERAL PELVIC

 LYMPH NODE DISSECTION, URETHROPEXY



                     2018          Surgery             Urology  

 

                                        



Keanu Kurtz APRN                   



                     2018          Anesthesia          Urology  



                                         Event   

 

                                        



Chandler Dowling MD                   Prostate cancer



                     2018          Hospital            [facility]  



                           -                         Encounter   



                                         2018    



after 08/10/2018



Social History







                                        Date



                 Tobacco Use     Types           Packs/Day       Years Used 

 

                                         



                                         Never Smoker    

 

    



                                         Smokeless Tobacco: Never   



                                         Used   









                                        Tobacco Cessation: Counseling Given: No











   



                 Alcohol Use     Drinks/Week     oz/Week         Comments

 

   



                                         No   









 



                           Sex Assigned at Birth     Date Recorded

 

 



                                         Not on file 









                                        Industry



                           Job Start Date            Occupation 

 

                                        Not on file



                           Not on file               Not on file 









                                        Travel End



                           Travel History            Travel Start 

 





                                         No recent travel history available.







Last Filed Vital Signs







                                        Time Taken



                           Vital Sign                Reading 

 

                                        2018 11:09 AM CDT



                           Blood Pressure            152/79 

 

                                        2018 11:09 AM CDT



                           Pulse                     68 

 

                                        2018 11:09 AM CDT



                           Temperature               35.9 C (96.6 F) 

 

                                        2018 11:09 AM CDT



                           Respiratory Rate          20 

 

                                        2018 11:09 AM CDT



                           Oxygen Saturation         94% 

 

                                        -



                           Inhaled Oxygen            - 



                                         Concentration  

 

                                        2018  6:59 AM CDT



                           Weight                    88.5 kg (195 lb 2 oz) 

 

                                        2018  6:59 AM CDT



                           Height                    167.6 cm (5' 6") 

 

                                        2018  6:59 AM CDT



                           Body Mass Index           31.49 







Plan of Treatment







   



                 Health Maintenance     Due Date        Last Done       Comments

 

   



                           COLONOSCOPY SCREENING     2002  

 

   



                           SHINGLES VACCINES (#1)     2002  

 

   



                           65+ PNEUMOCOCCAL VACCINE     2017  



                                         (1 of 2 - PCV13)   

 

   



                           INFLUENZA VACCINE         2019  







Implants







                    Device Identifier    Shelf Expiration Date    Model / Serial / Lot



                 Implanted       Type            Area            Manufactur   



                                         er   

 

                                        2023          [id number] /

 /

                                        [id number]



                 Clip Ligtng [employer] A[employer]r     Surgical        N/A: N/A        [employer] Salomon Lg - [id number]     Implants;           CLOSURE   



                     Implanted: Qty: 2 on 2018 by     [employer];          Chandler Oropeza MD     Extenders;     



                                         Surgical     



                                         Wires     

 

                                        2023          [id number] /

 /

                                        [id number]



                 Clip Ligtng [employer] Aplr     Surgical        N/A: N/A        [facility] [id number]     Implants;           CLOSURE   



                     Implanted: Qty: 1 on 2018 by     [employer];          Chandler Oropeza MD     Extenders;     



                                         Surgical     



                                         Wires     

 

                                        2023          [id number] /

 /

                                        [id number]



                 Clip Ligtng [employer] Aplr     Surgical        N/A: N/A        LORENA Salomon Nimesh - [id number]     Implants;           CLOSURE   



                     Implanted: Qty: 1 on 2018 by     [employer];          Chandler Oropeza MD     Extenders;     



                                         Surgical     



                                         Wires     

 

                                        2023          [id number] /

 /

                                        [id number]



                 Clip Ligtng [employer] Aplr     Surgical        N/A: N/A        [employer] Sal[employer] Lg - [id number]     Implants;           CLOSURE   



                     Implanted: Qty: 2 on 2018 by     [employer];          Chandler Oropeza MD     Extenders;     



                                         Surgical     



                                         Wires     

 

                                        2023          [id number] /

 /

                                        [id number]



                 Clip Ligtng [employer] Aplr     Surgical        N/A: N/A        [employer]ORE[employer] Tobar Lg - [id number]     Implants;           CLOSURE   



                     Implanted: Qty: 1 on 2018 by     [employer];          SYSTEMS   



                           Chandler Dowling MD     Extenders;     



                                         Surgical     



                                         Wires     







Procedures







                                        Comments



                 Procedure Name     Priority        Date/Time       Associated Diagnosis 

 

                                        



Results for this procedure are in the results section.



                 URINE CULTURE     Routine         2018      Malignant neoplasm of 



                           11:52 AM CDT              prostate 

 

                                        



Results for this procedure are in the results section.



                 [id number]         Routine         2018      Malignant neoplasm of 



                           11:34 AM CDT              prostate 

 

                                        



Results for this procedure are in the results section.



                     POC GLUCOSE         Routine             2018  



                                         12:13 PM CDT  

 

                                        



Results for this procedure are in the results section.



                     CREATININE LEVEL, MISC     Routine             2018  



                           FLUID                     7:40 AM CDT  

 

                                        



Results for this procedure are in the results section.



                     POC GLUCOSE         Routine             2018  



                                         7:28 AM CDT  

 

                                        



Results for this procedure are in the results section.



                     BASIC METABOLIC PANEL     Routine             2018  



                                         4:00 AM CDT  

 

                                        



Results for this procedure are in the results section.



                     ZZESTIMATED GFR     Routine             2018  



                                         4:00 AM CDT  

 

                                        



Results for this procedure are in the results section.



                     POC GLUCOSE         Routine             2018  



                                         9:26 PM CDT  

 

                                        



Results for this procedure are in the results section.



                     POC GLUCOSE         Routine             2018  



                                         5:36 PM CDT  

 

                                        



Results for this procedure are in the results section.



                     POC GLUCOSE         Routine             2018  



                                         11:52 AM CDT  

 

                                        



Results for this procedure are in the results section.



                     POC GLUCOSE         Routine             2018  



                                         7:28 AM CDT  

 

                                        



Results for this procedure are in the results section.



                     CBC HEMOGRAM        Routine             2018  



                                         4:30 AM CDT  

 

                                        



Results for this procedure are in the results section.



                     ZZESTIMATED GFR     Routine             2018  



                                         4:00 AM CDT  

 

                                        



Results for this procedure are in the results section.



                     BASIC METABOLIC PANEL     Routine             2018  



                                         4:00 AM CDT  

 

                                        



Results for this procedure are in the results section.



                     POC GLUCOSE         Routine             2018  



                                         9:35 PM CDT  

 

                                        



Results for this procedure are in the results section.



                     POC GLUCOSE         Routine             2018  



                                         5:35 PM CDT  

 

                                        



Results for this procedure are in the results section.



                     POC GLUCOSE         Routine             2018  



                                         11:28 AM CDT  

 

                                        



Results for this procedure are in the results section.



                     POC GLUCOSE         Routine             2018  



                                         8:00 AM CDT  

 

                                        



Results for this procedure are in the results section.



                     ZZESTIMATED GFR     Routine             2018  



                                         4:00 AM CDT  

 

                                        



Results for this procedure are in the results section.



                     BASIC METABOLIC PANEL     Routine             2018  



                                         4:00 AM CDT  

 

                                        



Results for this procedure are in the results section.



                     HEMOGLOBIN & HEMATOCRIT     Routine             2018  



                                         3:45 AM CDT  

 

                                        



Results for this procedure are in the results section.



                     POC GLUCOSE         Routine             2018  



                                         8:34 PM CDT  

 

                                        



Results for this procedure are in the results section.



                     HC COMPLETE BLD COUNT     Routine             2018  



                           W/AUTO DIFF               1:42 PM CDT  

 

                                        



Results for this procedure are in the results section.



                     POC GLUCOSE         Routine             2018  



                                         1:17 PM CDT  

 

                                        



Results for this procedure are in the results section.



                     ZZESTIMATED GFR     Routine             2018  



                                         1:01 PM CDT  

 

                                        



Results for this procedure are in the results section.



                     BASIC METABOLIC PANEL     Routine             2018  



                                         1:01 PM CDT  

 

                                        



Results for this procedure are in the results section.



                     SURGICAL PATHOLOGY     Routine             2018  



                           REQUEST                   9:31 AM CDT  

 

                                        



Results for this procedure are in the results section.



                     SURGICAL PATHOLOGY     Routine             2018  



                           REQUEST                   9:31 AM CDT  

 

                                         



                     WI AN ELECTIVE      Routine             2018  



                           ENDOTRACHEAL AIRWAY       8:25 AM CDT  

 

  



                                         Procedure



                                         Note -



                                         Gisell Benton -



                                         2018



                                         8:25 AM



                                         CDT



                                         Airway



                                         Date/Time:



                                         2018



                                         7:29 AM



                                         Performed



                                         by: GISELL BENTON



                                         Authorized



                                         by:



                                         DIMA DURAN





                                         Location:



                                         OR



                                         Urgency:



                                         Elective



                                         Difficult



                                         Airway: No



                                         Resident/C



                                         RNA/AA:



                                         GISELL BENTON



                                         Preoxygena



                                         erna with



                                         100% O2:



                                         Yes



                                         C-spine



                                         Precaution



                                         s



                                         Maintained



                                         Throughout



                                         : Yes



                                         Mask



                                         Ventilatio



                                         n:  Easy



                                         mask



                                         Final



                                         Airway



                                         Type:



                                         Endotrache



                                         al airway



                                         Final



                                         Endotrache



                                         al Airway:



                                         ETT



                                         Cuffed:



                                         Yes



                                         Technique



                                         Used:



                                         Direct



                                         laryngosco



                                         py



                                         Devices/Me



                                         thods Used



                                         in



                                         Placement:



                                         Intubatin



                                         g stylet



                                         Insertion



                                         Site:



                                         Oral



                                         Blade



                                         Type:



                                         Hewitt



                                         Laryngosco



                                         pe



                                         Blade/Vide



                                         olaryngosc



                                         ope Blade



                                         Size:  2



                                         ETT Size



                                         (mm):  8.0



                                         Cuff at



                                         minimum



                                         occlusion



                                         pressure:



                                         Yes



                                         Measured



                                         from:



                                         Gums



                                         ETT to



                                         Gums (cm):



                                         23



                                         Placement



                                         Verified



                                         by: CO2



                                         detection,



                                         direct



                                         visualizat



                                         ion and



                                         equal



                                         breath



                                         sounds



                                         Laryngosco



                                         pic view:



                                         Grade I -



                                         full view



                                         of glottis



                                         Rapid



                                         Sequence



                                         Induction



                                         (RSI): No





                                         Modified



                                         RSI: No



                                         Number of



                                         Attempts



                                         at



                                         Approach:



                                         1



                                         AURTRAUMA



                                         TIC  RUC



                                         CHIPPED



                                         PRIOR



                                         ARRIVAL



                                         OR   LUBE



                                         & TAPE



                                         BOTH  EYES



                                         OGT



 

                                         



                     PROSTATECTOMY,      2018          Prostate cancer 



                           LAPAROSCOPIC,             7:30 AM CDT  



                                         ROBOT-ASSISTED    

 

  



                                         Case Notes



                                         **DAVINCI*



                                         *,



                                         POSSIBLE



                                         EXTENDED



                                         RECOVERY

 

  



                                         Special



                                         Needs



                                         **DAVINCI*



                                         *,



                                         POSSIBLE



                                         EXTENDED



                                         RECOVERY

 

                                        



Results for this procedure are in the results section.



                     POC GLUCOSE         Routine             2018  



                                         6:21 AM CDT  



after 08/10/2018



Results

* Urine culture (2018 11:52 AM CDT)





    



              Component     Value        Ref Range     Performed At     Pathologist



                                         Signature

 

    



                     Urine culture       No growth           [facility] 













                                         Specimen

 





                                         Urine









 



                           Narrative                 Performed At

 

 



                           Performed at:[address] [place] [address]     [facility]



                                         [address] [place], [place] [id number] 



                                         : Jose Antonio Cordero MD, Phone:[phone] 









   



                 Performing Organization     Address         City/[facility]/Zipcode     Phone Number

 

   



                                         [facility]   





* POC BLADDER SCAN/PVR (2018 11:34 AM CDT)









                                         Specimen

 





                                         Urine









 



                           Impressions               Performed At

 

 



                                         0 ml 





* POC glucose (2018 12:13 PM CDT)



Only the most recent of 13 results within the time period is included.





    



              Component     Value        Ref Range     Performed At     Pathologist



                                         Signature

 

    



                 POC glucose     105 (H)         65 - 99 mg/dL     [facility] DEPARTMENT 



                           Comment:                  OF PATHOLOGY 



                           No Action Needed          AND GENOMIC 



                           Meter ID: [id number]      MEDICINE 



                                         : Imelda GHOTRA   













                                         Specimen

 











   



                 Performing Organization     Address         City/State/Zipcode     Phone Number

 

   



                     [facility] DEPARTMENT OF     [address] [place] [id number] 



                                         PATHOLOGY AND GENOMIC   



                                         MEDICINE   





* Creatinine level, misc fluid (2018  7:40 AM CDT)





    



              Component     Value        Ref Range     Performed At     Pathologist



                                         [facility] [place]

 

    



                     Fluid type          Peritoneal          [facility] DEPARTMENT 



                                         OF PATHOLOGY 



                                         AND GENOMIC 



                                         MEDICINE 

 

    



                 Creatinine,     0.7             mg/dL           [facility] DEPARTMENT 



                     fluid               Comment:            OF PATHOLOGY 



                           Analysis performed on [employer]      [employer] 



                           8000 analyzer. This is not an      MEDICINE 



                                         approved methodology for this   



                                         specimen type;accuracy and

   



                                         clinical significance   



                                         uncertain.   













                                         Specimen

 





                                         Fluid









   



                 Performing Organization     Address         City/[facility]/Zipcode     Phone Number

 

   



                     [place] [address] 



                                         PATHOLOGY AND GENOMIC   



                                         MEDICINE   





* Estimated GFR (2018  4:00 AM CDT)



Only the most recent of 4 results within the time period is included.





    



              Component     Value        Ref Range     Performed At     Pathologist



                                         Signature

 

    



                 GFR Non Af Amer     >90             mL/min/1.73 m2     [facility] DEPARTMENT 



                                         OF PATHOLOGY 



                                         AND GENOMIC 



                                         MEDICINE 

 

    



                 GFR Af Amer     >90             mL/min/1.73 m2     [facility] DEPARTMENT 



                           Comment:                  OF PATHOLOGY 



                           Chronic kidney disease: <60      AND GENOMIC 



                           mL/min/1.73m2             MEDICINE 



                                         Kidney failure: <15   



                                         mL/min/1.73m2   



                                         The estimated GFR is   



                                         calculated from the   



                                         IDMS-traceable Modification of   



                                         Diet   



                                         in Renal Disease Equation. The   



                                         accuracy of the calculation is   



                                         poor when the   



                                         creatinine is normal.   



                                         Calculated values >90   



                                         mL/min/1.73m2 are not   



                                         reported.   



                                         This equation has not been   



                                         validated in children (<18   



                                         years), pregnant   



                                         women, the elderly (>70   



                                         years), or ethnic groups other   



                                         than Caucasians and   



                                          Americans.   













                                         Specimen

 





                                         Plasma specimen









   



                 Performing Organization     Address         City/State/Zipcode     Phone Number

 

   



                     [place] [address] 



                                         PATHOLOGY AND [employer]   



                                         MEDICINE   





* Basic metabolic panel (2018  4:00 AM CDT)



Only the most recent of 4 results within the time period is included.





    



              Component     Value        Ref Range     Performed At     Pathologist



                                         [facility] [place]

 

    



                 Sodium          142             135 - 148 mEq/L     [facility] DEPARTMENT 



                                         OF PATHOLOGY 



                                         AND GENOMIC 



                                         MEDICINE 

 

    



                 Potassium       4.4             3.5 - 5.0 mEq/L     [facility] DEPARTMENT 



                                         OF PATHOLOGY 



                                         AND GENOMIC 



                                         MEDICINE 

 

    



                 Chloride        105             98 - 112 mEq/L     [facility] DEPARTMENT 



                                         OF PATHOLOGY 



                                         AND GENOMIC 



                                         MEDICINE 

 

    



                 CO2             19 (L)          24 - 31 mEq/L     [facility] DEPARTMENT 



                                         OF PATHOLOGY 



                                         AND GENOMIC 



                                         MEDICINE 

 

    



                 Anion gap       18@ANIO (H)     7 - 15 mEq/L     [facility] DEPARTMENT 



                                         OF PATHOLOGY 



                                         AND GENOMIC 



                                         MEDICINE 

 

    



                 BUN             16              8 - 23 mg/dL     [facility] DEPARTMENT 



                                         OF PATHOLOGY 



                                         AND GENOMIC 



                                         MEDICINE 

 

    



                 Creatinine      0.8             0.7 - 1.2 mg/dL     [facility] DEPARTMENT 



                                         OF PATHOLOGY 



                                         AND GENOMIC 



                                         MEDICINE 

 

    



                 Glucose         74              65 - 99 mg/dL     [facility] DEPARTMENT 



                                         OF PATHOLOGY 



                                         AND GENOMIC 



                                         MEDICINE 

 

    



                 Calcium         8.3 (L)         8.8 - 10.2 mg/dL     [facility] DEPARTMENT 



                                         OF PATHOLOGY 



                                         AND GENOMIC 



                                         MEDICINE 













                                         Specimen

 





                                         Plasma specimen









   



                 Performing Organization     Address         City/[facility]/Zipcode     Phone Number

 

   



                     [place] [address] 



                                         PATHOLOGY AND GENOMIC   



                                         MEDICINE   





* CBC hemogram (2018  4:30 AM CDT)





    



              Component     Value        Ref Range     Performed At     Pathologist



                                         Signature

 

    



                 WBC             8.54            4.50 - 11.00 k/uL     [facility] DEPARTMENT 



                                         OF PATHOLOGY 



                                         AND GENOMIC 



                                         MEDICINE 

 

    



                 RBC             4.30 (L)        4.40 - 6.00 m/uL     [facility] DEPARTMENT 



                                         OF PATHOLOGY 



                                         AND GENOMIC 



                                         MEDICINE 

 

    



                 HGB             12.2 (L)        14.0 - 18.0 g/dL     [facility] DEPARTMENT 



                                         OF PATHOLOGY 



                                         AND GENOMIC 



                                         MEDICINE 

 

    



                 HCT             40.6 (L)        41.0 - 51.0 %     [facility] DEPARTMENT 



                                         OF PATHOLOGY 



                                         AND GENOMIC 



                                         MEDICINE 

 

    



                 MCV             94.4            82.0 - 100.0 fL     [facility] DEPARTMENT 



                                         OF PATHOLOGY 



                                         AND GENOMIC 



                                         MEDICINE 

 

    



                 MCH             28.4            27.0 - 34.0 pg     [facility] DEPARTMENT 



                                         OF PATHOLOGY 



                                         AND GENOMIC 



                                         MEDICINE 

 

    



                 MCHC            30.0 (L)        31.0 - 37.0 g/dL     [facility] DEPARTMENT 



                                         OF PATHOLOGY 



                                         AND GENOMIC 



                                         MEDICINE 

 

    



                 RDW - SD        49.7            37.0 - 55.0 fL     [facility] DEPARTMENT 



                                         OF PATHOLOGY 



                                         AND GENOMIC 



                                         MEDICINE 

 

    



                 MPV             11.8            8.8 - 13.2 fL     [facility] DEPARTMENT 



                                         OF PATHOLOGY 



                                         AND GENOMIC 



                                         MEDICINE 

 

    



                 Platelet count     156             150 - 400 k/uL     [facility] DEPARTMENT 



                                         OF PATHOLOGY 



                                         AND GENOMIC 



                                         MEDICINE 

 

    



                 Nucleated RBC     0.00            /100 WBC        [facility] DEPARTMENT 



                                         OF PATHOLOGY 



                                         AND GENOMIC 



                                         MEDICINE 













                                         Specimen

 





                                         Blood









   



                 Performing Organization     Address         City/[facility]     Phone Number

 

   



                     [facility] [place] [address] 



                                         PATHOLOGY AND GENOMIC   



                                         MEDICINE   





* Hemoglobin & hematocrit (2018  3:45 AM CDT)





    



              Component     Value        Ref Range     Performed At     Pathologist



                                         Signature

 

    



                 HGB             12.2 (L)        14.0 - 18.0 g/dL     [facility] DEPARTMENT 



                                         OF PATHOLOGY 



                                         AND GENOMIC 



                                         MEDICINE 

 

    



                 HCT             39.9 (L)        41.0 - 51.0 %     [facility] DEPARTMENT 



                                         OF PATHOLOGY 



                                         AND GENOMIC 



                                         MEDICINE 













                                         Specimen

 





                                         Blood









   



                 Performing Organization     Address         City/[facility]/Zipcode     Phone Number

 

   



                     [place] [address] 



                                         PATHOLOGY AND GENOMIC   



                                         MEDICINE   





* CBC with platelet and differential (2018  1:42 PM CDT)





    



              Component     Value        Ref Range     Performed At     Pathologist



                                         Signature

 

    



                 WBC             11.82 (H)       4.50 - 11.00 k/uL     [facility] DEPARTMENT 



                                         OF PATHOLOGY 



                                         AND GENOMIC 



                                         MEDICINE 

 

    



                 RBC             4.81            4.40 - 6.00 m/uL     [facility] DEPARTMENT 



                                         OF PATHOLOGY 



                                         AND GENOMIC 



                                         MEDICINE 

 

    



                 HGB             13.6 (L)        14.0 - 18.0 g/dL     [facility] DEPARTMENT 



                                         OF PATHOLOGY 



                                         AND GENOMIC 



                                         MEDICINE 

 

    



                 HCT             43.8            41.0 - 51.0 %     [facility] DEPARTMENT 



                                         OF PATHOLOGY 



                                         AND GENOMIC 



                                         MEDICINE 

 

    



                 MCV             91.1            82.0 - 100.0 fL     [facility] DEPARTMENT 



                                         OF PATHOLOGY 



                                         AND GENOMIC 



                                         MEDICINE 

 

    



                 MCH             28.3            27.0 - 34.0 pg     [facility] DEPARTMENT 



                                         OF PATHOLOGY 



                                         AND GENOMIC 



                                         MEDICINE 

 

    



                 MCHC            31.1            31.0 - 37.0 g/dL     [facility] DEPARTMENT 



                                         OF PATHOLOGY 



                                         AND GENOMIC 



                                         MEDICINE 

 

    



                 RDW - SD        46.9            37.0 - 55.0 fL     [facility] DEPARTMENT 



                                         OF PATHOLOGY 



                                         AND GENOMIC 



                                         MEDICINE 

 

    



                 MPV             11.7            8.8 - 13.2 fL     [facility] DEPARTMENT 



                                         OF PATHOLOGY 



                                         AND GENOMIC 



                                         MEDICINE 

 

    



                 Platelet count     241             150 - 400 k/uL     [facility] DEPARTMENT 



                                         OF PATHOLOGY 



                                         AND GENOMIC 



                                         MEDICINE 

 

    



                 Nucleated RBC     0.20            /100 WBC        [facility] DEPARTMENT 



                                         OF PATHOLOGY 



                                         AND GENOMIC 



                                         MEDICINE 

 

    



                 Neutrophils     82.5 (H)        39.0 - 69.0 %     [facility] DEPARTMENT 



                                         OF PATHOLOGY 



                                         AND GENOMIC 



                                         MEDICINE 

 

    



                 Lymphocytes     9.9 (L)         25.0 - 45.0 %     [facility] DEPARTMENT 



                                         OF PATHOLOGY 



                                         AND GENOMIC 



                                         MEDICINE 

 

    



                 Monocytes       6.5             0.0 - 10.0 %     [facility] DEPARTMENT 



                                         OF PATHOLOGY 



                                         AND GENOMIC 



                                         MEDICINE 

 

    



                 Eosinophils     0.6             0.0 - 5.0 %     [facility] DEPARTMENT 



                                         OF PATHOLOGY 



                                         AND GENOMIC 



                                         MEDICINE 

 

    



                 Basophils       0.2             0.0 - 1.0 %     [facility] DEPARTMENT 



                                         OF PATHOLOGY 



                                         AND GENOMIC 



                                         MEDICINE 

 

    



                 Immature        0.3Comment: "Immature     0.0 - 1.0 %     [facility] DEPARTMENT 



                     granulocytes        granulocytes"  (promyelocytes,      OF PATHOLOGY 



                           myelocytes, metamyelocytes)      AND GENOMIC 



                                         MEDICINE 













                                         Specimen

 





                                         Blood









   



                 Performing Organization     Address         City/State/Zipcode     Phone Number

 

   



                     [facility] DEPARTMENT OF     [address] [place] [address] 



                                         PATHOLOGY AND GENOMIC   



                                         MEDICINE   





* Surgical pathology request (2018  9:31 AM CDT)



Only the most recent of 2 results within the time period is included.





    



              Component     Value        Ref Range     Performed At     Pathologist



                                         Signature

 

    



                     Case number         [id number]        [facility] DEPARTMENT 



                                         OF PATHOLOGY 



                                         AND GENOMIC 



                                         MEDICINE 

 

    



                     Surgical            See link below for PDF Lab      [facility] DEPARTMENT 



                     pathology           Report              OF PATHOLOGY 



                           report                    AND GENOMIC 



                                         MEDICINE 

 

    



                     Result status       This is Supplemental Report      [facility] DEPARTMENT 



                           for [id number]          OF PATHOLOGY 



                                         AND GENOMIC 



                                         MEDICINE 













                                         Specimen

 











   



                 Performing Organization     Address         City/State/Zipcode     Phone Number

 

   



                     [facility] DEPARTMENT OF     [address] [place] [address] 



                                         PATHOLOGY AND GENOMIC   



                                         MEDICINE   





after 08/10/2018



Insurance







                                        Type



            Payer      Benefit     Subscriber ID     Effective     Phone      Address 



                           Plan /                    Dates   



                                         Group     

 

                                        PPO



                 BCBS            BCBS            xxxxxxxxxxxx     2017-P   



                           CHOICE                    resent   



                                         PPO/NICOLASA SALAZAR PPO     









     



            Guarantor Name     Account     Relation to     Date of     Phone      Billing Address



                     Type                Patient             Birth  

 

     



            Leland Lacy     Personal/F     Self       1950     [phone] [address]               ([place])              [place] [address]







Advance Directives





Patient has advance care planning documents on file. For more information, jethro madison contact:



Demar Cartagena



[address] [place] [address]

## 2019-08-11 NOTE — NUR
Received bedside report from day shift RN. The patient is laying on the bed, not in 
distress. Bed height low, call light within reach, wheels locked, and side rails up x2. The 
patient verbalized understanding to be NPO after midnight for cystoscopy tomorrow afternoon.

## 2019-08-11 NOTE — NUR
Walking rounds and report received. Patient continues to c/o burning while urinating. Son at 
the bedside. POC discussed in Turkmen. Bed in lowest position, locked, and call bell within 
reach.

## 2019-08-12 VITALS — DIASTOLIC BLOOD PRESSURE: 82 MMHG | SYSTOLIC BLOOD PRESSURE: 131 MMHG

## 2019-08-12 VITALS — SYSTOLIC BLOOD PRESSURE: 157 MMHG | DIASTOLIC BLOOD PRESSURE: 89 MMHG

## 2019-08-12 VITALS — DIASTOLIC BLOOD PRESSURE: 83 MMHG | SYSTOLIC BLOOD PRESSURE: 142 MMHG

## 2019-08-12 VITALS — SYSTOLIC BLOOD PRESSURE: 146 MMHG | DIASTOLIC BLOOD PRESSURE: 90 MMHG

## 2019-08-12 VITALS — DIASTOLIC BLOOD PRESSURE: 78 MMHG | SYSTOLIC BLOOD PRESSURE: 143 MMHG

## 2019-08-12 VITALS — SYSTOLIC BLOOD PRESSURE: 139 MMHG | DIASTOLIC BLOOD PRESSURE: 81 MMHG

## 2019-08-12 VITALS — DIASTOLIC BLOOD PRESSURE: 76 MMHG | SYSTOLIC BLOOD PRESSURE: 125 MMHG

## 2019-08-12 VITALS — SYSTOLIC BLOOD PRESSURE: 126 MMHG | DIASTOLIC BLOOD PRESSURE: 79 MMHG

## 2019-08-12 VITALS — DIASTOLIC BLOOD PRESSURE: 81 MMHG | SYSTOLIC BLOOD PRESSURE: 139 MMHG

## 2019-08-12 LAB
ANION GAP SERPL CALC-SCNC: 11.4 MMOL/L (ref 8–16)
BASOPHILS # BLD AUTO: 0 10*3/UL (ref 0–0.1)
BASOPHILS NFR BLD AUTO: 0.3 % (ref 0–1)
BUN SERPL-MCNC: 10 MG/DL (ref 7–26)
BUN/CREAT SERPL: 14 (ref 6–25)
CALCIUM SERPL-MCNC: 9 MG/DL (ref 8.4–10.2)
CHLORIDE SERPL-SCNC: 104 MMOL/L (ref 98–107)
CO2 SERPL-SCNC: 26 MMOL/L (ref 22–29)
DEPRECATED APTT PLAS QN: 28.1 SECONDS (ref 23.8–35.5)
DEPRECATED INR PLAS: 1.01
DEPRECATED NEUTROPHILS # BLD AUTO: 5.1 10*3/UL (ref 2.1–6.9)
EGFRCR SERPLBLD CKD-EPI 2021: > 60 ML/MIN (ref 60–?)
EOSINOPHIL # BLD AUTO: 0.3 10*3/UL (ref 0–0.4)
EOSINOPHIL NFR BLD AUTO: 4 % (ref 0–6)
EOSINOPHIL NFR BLD MANUAL: 2 % (ref 0–7)
ERYTHROCYTE [DISTWIDTH] IN CORD BLOOD: 15.1 % (ref 11.7–14.4)
GLUCOSE SERPLBLD-MCNC: 130 MG/DL (ref 74–118)
HCT VFR BLD AUTO: 29.9 % (ref 38.2–49.6)
HGB BLD-MCNC: 9.2 G/DL (ref 14–18)
LYMPHOCYTES # BLD: 0.6 10*3/UL (ref 1–3.2)
LYMPHOCYTES NFR BLD AUTO: 8.6 % (ref 18–39.1)
LYMPHOCYTES NFR BLD MANUAL: 8 % (ref 19–48)
MAGNESIUM SERPL-MCNC: 1.6 MG/DL (ref 1.3–2.1)
MCH RBC QN AUTO: 26.6 PG (ref 28–32)
MCHC RBC AUTO-ENTMCNC: 30.8 G/DL (ref 31–35)
MCV RBC AUTO: 86.4 FL (ref 81–99)
MONOCYTES # BLD AUTO: 0.8 10*3/UL (ref 0.2–0.8)
MONOCYTES NFR BLD AUTO: 11.1 % (ref 4.4–11.3)
MONOCYTES NFR BLD MANUAL: 8 % (ref 3.4–9)
NEUTS SEG NFR BLD AUTO: 75.4 % (ref 38.7–80)
NEUTS SEG NFR BLD MANUAL: 82 % (ref 40–74)
PLAT MORPH BLD: NORMAL
PLATELET # BLD AUTO: 353 X10E3/UL (ref 140–360)
PLATELET # BLD EST: ADEQUATE 10*3/UL
POTASSIUM SERPL-SCNC: 3.4 MMOL/L (ref 3.5–5.1)
PROTHROMBIN TIME: 13.8 SECONDS (ref 11.9–14.5)
RBC # BLD AUTO: 3.46 X10E6/UL (ref 4.3–5.7)
SODIUM SERPL-SCNC: 138 MMOL/L (ref 136–145)

## 2019-08-12 PROCEDURE — BT141ZZ FLUOROSCOPY OF KIDNEYS, URETERS AND BLADDER USING LOW OSMOLAR CONTRAST: ICD-10-PCS | Performed by: UROLOGY

## 2019-08-12 PROCEDURE — 0TBB8ZX EXCISION OF BLADDER, VIA NATURAL OR ARTIFICIAL OPENING ENDOSCOPIC, DIAGNOSTIC: ICD-10-PCS | Performed by: UROLOGY

## 2019-08-12 PROCEDURE — 0T5C8ZZ DESTRUCTION OF BLADDER NECK, VIA NATURAL OR ARTIFICIAL OPENING ENDOSCOPIC: ICD-10-PCS | Performed by: UROLOGY

## 2019-08-12 RX ADMIN — SITAGLIPTIN SCH MG: 100 TABLET, FILM COATED ORAL at 10:13

## 2019-08-12 RX ADMIN — CEFEPIME HYDROCHLORIDE SCH MLS/HR: 1 INJECTION, POWDER, FOR SOLUTION INTRAMUSCULAR; INTRAVENOUS at 05:26

## 2019-08-12 RX ADMIN — Medication PRN MG: at 08:38

## 2019-08-12 RX ADMIN — GLIMEPIRIDE SCH MG: 2 TABLET ORAL at 23:30

## 2019-08-12 RX ADMIN — Medication SCH TAB: at 17:20

## 2019-08-12 RX ADMIN — CARVEDILOL SCH MG: 12.5 TABLET, FILM COATED ORAL at 09:00

## 2019-08-12 RX ADMIN — CEFEPIME HYDROCHLORIDE SCH MLS/HR: 1 INJECTION, POWDER, FOR SOLUTION INTRAMUSCULAR; INTRAVENOUS at 12:00

## 2019-08-12 RX ADMIN — CEFEPIME HYDROCHLORIDE SCH MLS/HR: 1 INJECTION, POWDER, FOR SOLUTION INTRAMUSCULAR; INTRAVENOUS at 17:20

## 2019-08-12 RX ADMIN — INSULIN LISPRO SCH UNIT: 100 INJECTION, SOLUTION INTRAVENOUS; SUBCUTANEOUS at 07:30

## 2019-08-12 RX ADMIN — CARVEDILOL SCH MG: 12.5 TABLET, FILM COATED ORAL at 17:20

## 2019-08-12 RX ADMIN — AMLODIPINE BESYLATE SCH MG: 10 TABLET ORAL at 09:00

## 2019-08-12 RX ADMIN — INSULIN LISPRO SCH UNIT: 100 INJECTION, SOLUTION INTRAVENOUS; SUBCUTANEOUS at 16:30

## 2019-08-12 RX ADMIN — Medication SCH TAB: at 09:00

## 2019-08-12 RX ADMIN — ATORVASTATIN CALCIUM SCH MG: 10 TABLET, FILM COATED ORAL at 21:00

## 2019-08-12 RX ADMIN — LISINOPRIL SCH MG: 20 TABLET ORAL at 09:00

## 2019-08-12 RX ADMIN — INSULIN LISPRO SCH UNIT: 100 INJECTION, SOLUTION INTRAVENOUS; SUBCUTANEOUS at 21:00

## 2019-08-12 RX ADMIN — GLIMEPIRIDE SCH MG: 2 TABLET ORAL at 11:30

## 2019-08-12 RX ADMIN — INSULIN LISPRO SCH UNIT: 100 INJECTION, SOLUTION INTRAVENOUS; SUBCUTANEOUS at 11:30

## 2019-08-12 NOTE — NUR
BS rounds completed with morning nurse. Pt alert and orient to name. Montelongo 16 Fr, yellow, 
cloudy urine flowing. Denies pain at this time. Call bell within reach. Bed low and locked.  
Family at bedside. Will continue to monitor.

## 2019-08-12 NOTE — NUR
PT RETURNED TO ROOM VIA BED,16FR FISHMAN CATH TO BSD CLEAR YELLOW URINE.IVF INFUSING ,O2 2L NC 
IN PLACE.PAIN LEVEL 4 TO ABD

## 2019-08-13 VITALS — SYSTOLIC BLOOD PRESSURE: 122 MMHG | DIASTOLIC BLOOD PRESSURE: 77 MMHG

## 2019-08-13 VITALS — SYSTOLIC BLOOD PRESSURE: 158 MMHG | DIASTOLIC BLOOD PRESSURE: 95 MMHG

## 2019-08-13 VITALS — DIASTOLIC BLOOD PRESSURE: 86 MMHG | SYSTOLIC BLOOD PRESSURE: 140 MMHG

## 2019-08-13 VITALS — DIASTOLIC BLOOD PRESSURE: 83 MMHG | SYSTOLIC BLOOD PRESSURE: 138 MMHG

## 2019-08-13 RX ADMIN — LISINOPRIL SCH MG: 20 TABLET ORAL at 09:00

## 2019-08-13 RX ADMIN — SITAGLIPTIN SCH MG: 100 TABLET, FILM COATED ORAL at 09:00

## 2019-08-13 RX ADMIN — INSULIN LISPRO SCH UNIT: 100 INJECTION, SOLUTION INTRAVENOUS; SUBCUTANEOUS at 07:30

## 2019-08-13 RX ADMIN — CARVEDILOL SCH MG: 12.5 TABLET, FILM COATED ORAL at 09:00

## 2019-08-13 RX ADMIN — GLIMEPIRIDE SCH MG: 2 TABLET ORAL at 12:00

## 2019-08-13 RX ADMIN — AMLODIPINE BESYLATE SCH MG: 10 TABLET ORAL at 09:00

## 2019-08-13 NOTE — NUR
PT DISCHARGED HOME IV DCD WITHOUT REDNESS OR SWELLING,INSTRUCTED TO FOLLOE=WUP WITH 
UROLOGIST THAT DID HIS SURGERY BEFIORE,TRNSPORTED TO AUTO VI W/C

## 2019-08-13 NOTE — OPERATIVE REPORT
DATE OF PROCEDURE:  08/12/2019

 

SURGEON:  Vinh Smith MD

 

PREOPERATIVE DIAGNOSES:  

1. Hematuria.

2. Cystitis.

3. Pelvic lymphocele.

4. Urethral stricture-bladder neck contracture.

 

POSTOPERATIVE DIAGNOSES:  

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

2. Status post radical prostatectomy and lymphocele.

 

PROCEDURES:  Retrograde urethrogram, cystoscopy, bilateral retrograde pyelograms,

multiple bladder biopsies and fulguration. 

 

ANESTHESIA:  Staff.

 

ANESTHESIA:  General.

 

FINDINGS:  Retrograde urethrogram showed no evidence of strictures or bladder neck

contractures.  Pictures were taken of the case.  The patient was given a video, pictures

taken.  The bladder neck was swollen.  There was some cystitis cystica on the trigone.

Both ureteral orifices were normal.  The posterior wall of the bladder was severely

swollen.  When the biopsy was done, mucousy tissue or necrotic tissue was seen emanating

from the mucosa.  There were scattered spider web lesions compatible with radiation

cystitis.  Bladder capacity was diminished 120 mL under anesthesia.  Biopsies were done

and fulguration was done. 

 

DESCRIPTION OF PROCEDURE:  With the patient under satisfactory general anesthetic, the

patient was placed in the supine position on the operating table.  Legs were placed on

stirrups.  Genitalia was prepped with Betadine soap and solution and draped in usual

manner.  After the patient was prepped and draped, time-out was obtained.  All agreed

with the procedure as planned.  Using a Cira syringe, contrast media was injected

retrograde to fill out the urethra.  Multiple x-rays were taken and no evidence of

obstruction was seen.  Next, 22-Sierra Leonean cystourethroscope was passed per urethra.  It was

noted that there was no stricture or bladder neck contracture, but there was no

prostate.  The external sphincter was appreciated and he seems to be normal.  Within the

bladder neck, bullous edema was found as well as some evidence of small little growth

almost look like superficial transitional cell carcinoma, but I would pretty much make a

bet that this is secondary to radiation cystitis.  The same was found in the area above

the trigone to the right side of the bladder and in the midline in the posterior wall.

The rest of the bladder, right lateral dome anterior and left lateral is normal.  At

this point, using a #8 cone-tip ureteral catheter, contrast media was injected

retrograde up to both kidneys.  CT scan does confirm that there was no evidence of

obstruction.  Both kidneys drained well.  After that, the cup biopsy forceps was used to

obtain biopsies and this was done satisfactorily using the Bugbee electrode to fulgurate

the area.  A posterior wall bladder neck biopsies were done.  After that, the

instruments were removed.  A 16-Sierra Leonean Montelongo catheter was passed per urethra.  The

bladder left indwelling and the patient was taken to recovery room in satisfactory

condition.  Urological discharge.  The patient does not have an abscess of the groin,

has a lymphocele.  This needs to be surgically treated.  The patient is a [facility]

patient, this can be arranged for the doctors at [facility] to take care of.  The

next thing was the biopsies that were done.  I asked the pathologist to count mass cells

to see the degree of inflammatory response to the area.  This may very well be something

that is quite severe, may need to have hyperbaric treatment now before he continues to

progressively get worse and the patient begins to bleed from all over the bladder.  I

discussed that with the patient, the patient, wife and the son.  That it was very

important that he follow up with the urologist at [facility] for lymphocele

treatment and for radiation cystitis with hyperbaric treatment. 

 

 

 

 

______________________________

MD GAGAN Welch/BAY

D:  08/12/2019 18:49:07

T:  08/13/2019 01:59:18

Job #:  [id number]

## 2019-08-13 NOTE — NUR
16 Fr Montelongo cath d/c'd per order, 10ml of water drawn from balloon. Catheter removed without 
easily, catheter tip intact. Pt tolerated well. Pt instructed to notify nurse of first void.

## 2019-08-14 NOTE — DISCHARGE SUMMARY
PRIMARY CARE DOCTOR:  Dr. Wilder Almonte.

 

FINAL DIAGNOSES:  

1. Radiation-induced cystitis.

2. Right groin lymphocele.

3. Diabetes.

4. Hypertension.

5. Prostate cancer, status post prostatectomy over year ago.

 

CONSULTANTS:  Dr. Vinh Smith, Urology.

 

PROCEDURE/STUDIES PERFORMED:  

1. Cystoscopy.

2. CT of the abdomen and pelvis.

 

HISTORY:  Per H and P.

 

HOSPITAL COURSE:  The patient initially admitted for what thought to be UTI, failing

outpatient antibiotics and also with a possible right iliac abscess.  However, the

patient was evaluated by Urology.  Cystoscopy was done.  The patient has

radiation-induced cystitis and also he does not have an abscess.  This is due to

lymphocele.  The patient had radical prostatectomy a year and a half ago with bilateral

lymph node dissection and unfortunately that this is a side effect.  The patient was on

IV antibiotics initially, however, this was discontinued.  I have printed out a report

of the cystoscopy dictation and gave it to the wife.  The patient will follow up with

his primary care doctor in a week and also he needs to follow up with the [facility]

Urologist to have the lymphocele drained and also management of radiation-induced

cystitis.  The patient is seen and examined today.  It took 35 minutes total to

discharge this patient including updating the family members at the bedside and primary

care doctor.  I have also discussed with the urologist as well. 

 

CONDITION ON DISCHARGE:  Improved.

 

DISCHARGE MEDICATIONS:  Please see medication reconciliation form.

 

 

 

 

______________________________

MD FERCHO Webster/BAY

D:  08/13/2019 11:53:38

T:  08/14/2019 04:58:47

Job #:  [id number]

 

cc:            [facility]

## 2019-08-19 NOTE — DIAGNOSTIC IMAGING REPORT
Retrograde urethrogram and pyelogram.





History: Right iliac muscle abscess.



Comparison: None available.



Discussion: Procedure was performed by urology. Contrast was injected in a

retrograde fashion into the urethra and bilateral ureters ureter.  Multiple

images were obtained.  Fluoro time: 0.3 minutes. Dose: 7.4 mGy (ROSINA)       



There is filling of the urethra without evidence of stricture, filling defect,

or luminal irregularity. There is filling of bilateral ureters with contrast

noted to flow into the bilateral renal collecting systems without evidence of

stricture, filling defect, or luminal irregularity.  





IMPRESSION:

Normal retrograde urethrogram and pyelogram. 



Signed by: Matt Marshall on 8/19/2019 9:09 AM

## 2019-08-23 ENCOUNTER — HOSPITAL ENCOUNTER (EMERGENCY)
Dept: HOSPITAL 88 - ER | Age: 69
Discharge: HOME | End: 2019-08-23
Payer: MEDICARE

## 2019-08-23 VITALS — WEIGHT: 202 LBS | HEIGHT: 64 IN | BODY MASS INDEX: 34.49 KG/M2

## 2019-08-23 DIAGNOSIS — Z79.84: ICD-10-CM

## 2019-08-23 DIAGNOSIS — I10: ICD-10-CM

## 2019-08-23 DIAGNOSIS — E11.9: ICD-10-CM

## 2019-08-23 DIAGNOSIS — R31.29: ICD-10-CM

## 2019-08-23 DIAGNOSIS — E78.5: ICD-10-CM

## 2019-08-23 DIAGNOSIS — K21.9: ICD-10-CM

## 2019-08-23 DIAGNOSIS — R30.0: Primary | ICD-10-CM

## 2019-08-23 LAB
ANION GAP SERPL CALC-SCNC: 14.9 MMOL/L (ref 8–16)
BACTERIA URNS QL MICRO: (no result) /HPF
BASOPHILS # BLD AUTO: 0 10*3/UL (ref 0–0.1)
BASOPHILS NFR BLD AUTO: 0.3 % (ref 0–1)
BILIRUB UR QL: NEGATIVE
BUN SERPL-MCNC: 18 MG/DL (ref 7–26)
BUN/CREAT SERPL: 22 (ref 6–25)
CALCIUM SERPL-MCNC: 9.7 MG/DL (ref 8.4–10.2)
CHLORIDE SERPL-SCNC: 102 MMOL/L (ref 98–107)
CLARITY UR: (no result)
CO2 SERPL-SCNC: 25 MMOL/L (ref 22–29)
COLOR UR: YELLOW
DEPRECATED NEUTROPHILS # BLD AUTO: 5.6 10*3/UL (ref 2.1–6.9)
EGFRCR SERPLBLD CKD-EPI 2021: > 60 ML/MIN (ref 60–?)
EOSINOPHIL # BLD AUTO: 0.2 10*3/UL (ref 0–0.4)
EOSINOPHIL NFR BLD AUTO: 2.5 % (ref 0–6)
EPI CELLS URNS QL MICRO: (no result) /LPF
ERYTHROCYTE [DISTWIDTH] IN CORD BLOOD: 15.6 % (ref 11.7–14.4)
GLUCOSE SERPLBLD-MCNC: 133 MG/DL (ref 74–118)
HCT VFR BLD AUTO: 32.5 % (ref 38.2–49.6)
HGB BLD-MCNC: 10 G/DL (ref 14–18)
KETONES UR QL STRIP.AUTO: NEGATIVE
LEUKOCYTE ESTERASE UR QL STRIP.AUTO: NEGATIVE
LYMPHOCYTES # BLD: 0.8 10*3/UL (ref 1–3.2)
LYMPHOCYTES NFR BLD AUTO: 10.3 % (ref 18–39.1)
MCH RBC QN AUTO: 26.5 PG (ref 28–32)
MCHC RBC AUTO-ENTMCNC: 30.8 G/DL (ref 31–35)
MCV RBC AUTO: 86.2 FL (ref 81–99)
MONOCYTES # BLD AUTO: 0.7 10*3/UL (ref 0.2–0.8)
MONOCYTES NFR BLD AUTO: 9.5 % (ref 4.4–11.3)
NEUTS SEG NFR BLD AUTO: 77 % (ref 38.7–80)
NITRITE UR QL STRIP.AUTO: NEGATIVE
PLATELET # BLD AUTO: 367 X10E3/UL (ref 140–360)
POTASSIUM SERPL-SCNC: 3.9 MMOL/L (ref 3.5–5.1)
PROT UR QL STRIP.AUTO: (no result)
RBC # BLD AUTO: 3.77 X10E6/UL (ref 4.3–5.7)
SODIUM SERPL-SCNC: 138 MMOL/L (ref 136–145)
SP GR UR STRIP: 1.02 (ref 1.01–1.02)
UROBILINOGEN UR STRIP-MCNC: 0.2 MG/DL (ref 0.2–1)
WBC #/AREA URNS HPF: (no result) /HPF (ref 0–5)

## 2019-08-23 PROCEDURE — 80048 BASIC METABOLIC PNL TOTAL CA: CPT

## 2019-08-23 PROCEDURE — 99283 EMERGENCY DEPT VISIT LOW MDM: CPT

## 2019-08-23 PROCEDURE — 36415 COLL VENOUS BLD VENIPUNCTURE: CPT

## 2019-08-23 PROCEDURE — 81001 URINALYSIS AUTO W/SCOPE: CPT

## 2019-08-23 PROCEDURE — 85025 COMPLETE CBC W/AUTO DIFF WBC: CPT

## 2019-10-23 ENCOUNTER — HOSPITAL ENCOUNTER (EMERGENCY)
Dept: HOSPITAL 88 - ER | Age: 69
Discharge: HOME | End: 2019-10-23
Payer: MEDICARE

## 2019-10-23 VITALS — WEIGHT: 202 LBS | HEIGHT: 64 IN | BODY MASS INDEX: 34.49 KG/M2

## 2019-10-23 VITALS — SYSTOLIC BLOOD PRESSURE: 156 MMHG | DIASTOLIC BLOOD PRESSURE: 94 MMHG

## 2019-10-23 DIAGNOSIS — R42: ICD-10-CM

## 2019-10-23 DIAGNOSIS — R51: ICD-10-CM

## 2019-10-23 DIAGNOSIS — K21.9: ICD-10-CM

## 2019-10-23 DIAGNOSIS — E11.9: ICD-10-CM

## 2019-10-23 DIAGNOSIS — E78.5: ICD-10-CM

## 2019-10-23 DIAGNOSIS — I10: Primary | ICD-10-CM

## 2019-10-23 LAB
ALBUMIN SERPL-MCNC: 3.4 G/DL (ref 3.5–5)
ALBUMIN/GLOB SERPL: 0.7 {RATIO} (ref 0.8–2)
ALP SERPL-CCNC: 67 IU/L (ref 40–150)
ALT SERPL-CCNC: 14 IU/L (ref 0–55)
ANION GAP SERPL CALC-SCNC: 15.6 MMOL/L (ref 8–16)
BACTERIA URNS QL MICRO: (no result) /HPF
BASOPHILS # BLD AUTO: 0 10*3/UL (ref 0–0.1)
BASOPHILS NFR BLD AUTO: 0.2 % (ref 0–1)
BILIRUB UR QL: NEGATIVE
BUN SERPL-MCNC: 16 MG/DL (ref 7–26)
BUN/CREAT SERPL: 18 (ref 6–25)
CALCIUM SERPL-MCNC: 10.2 MG/DL (ref 8.4–10.2)
CHLORIDE SERPL-SCNC: 104 MMOL/L (ref 98–107)
CK MB SERPL-MCNC: 2.8 NG/ML (ref 0–5)
CK SERPL-CCNC: 33 IU/L (ref 30–200)
CLARITY UR: CLEAR
CO2 SERPL-SCNC: 22 MMOL/L (ref 22–29)
COLOR UR: YELLOW
DEPRECATED APTT PLAS QN: 34.1 SECONDS (ref 23.8–35.5)
DEPRECATED INR PLAS: 0.95
DEPRECATED NEUTROPHILS # BLD AUTO: 3.4 10*3/UL (ref 2.1–6.9)
EGFRCR SERPLBLD CKD-EPI 2021: > 60 ML/MIN (ref 60–?)
EOSINOPHIL # BLD AUTO: 0.3 10*3/UL (ref 0–0.4)
EOSINOPHIL NFR BLD AUTO: 5.1 % (ref 0–6)
EPI CELLS URNS QL MICRO: (no result) /LPF
ERYTHROCYTE [DISTWIDTH] IN CORD BLOOD: 19.9 % (ref 11.7–14.4)
GLOBULIN PLAS-MCNC: 4.6 G/DL (ref 2.3–3.5)
GLUCOSE SERPLBLD-MCNC: 203 MG/DL (ref 74–118)
HCT VFR BLD AUTO: 37.4 % (ref 38.2–49.6)
HGB BLD-MCNC: 11.3 G/DL (ref 14–18)
KETONES UR QL STRIP.AUTO: NEGATIVE
LEUKOCYTE ESTERASE UR QL STRIP.AUTO: NEGATIVE
LIPASE SERPL-CCNC: 39 U/L (ref 8–78)
LYMPHOCYTES # BLD: 1 10*3/UL (ref 1–3.2)
LYMPHOCYTES NFR BLD AUTO: 18.8 % (ref 18–39.1)
MCH RBC QN AUTO: 26.4 PG (ref 28–32)
MCHC RBC AUTO-ENTMCNC: 30.2 G/DL (ref 31–35)
MCV RBC AUTO: 87.4 FL (ref 81–99)
MONOCYTES # BLD AUTO: 0.4 10*3/UL (ref 0.2–0.8)
MONOCYTES NFR BLD AUTO: 8 % (ref 4.4–11.3)
NEUTS SEG NFR BLD AUTO: 67.5 % (ref 38.7–80)
NITRITE UR QL STRIP.AUTO: NEGATIVE
PLATELET # BLD AUTO: 244 X10E3/UL (ref 140–360)
POTASSIUM SERPL-SCNC: 3.6 MMOL/L (ref 3.5–5.1)
PROT UR QL STRIP.AUTO: (no result)
PROTHROMBIN TIME: 13.2 SECONDS (ref 11.9–14.5)
RBC # BLD AUTO: 4.28 X10E6/UL (ref 4.3–5.7)
SODIUM SERPL-SCNC: 138 MMOL/L (ref 136–145)
SP GR UR STRIP: 1.01 (ref 1.01–1.02)
UROBILINOGEN UR STRIP-MCNC: 0.2 MG/DL (ref 0.2–1)
WBC #/AREA URNS HPF: (no result) /HPF (ref 0–5)

## 2019-10-23 PROCEDURE — 36415 COLL VENOUS BLD VENIPUNCTURE: CPT

## 2019-10-23 PROCEDURE — 85730 THROMBOPLASTIN TIME PARTIAL: CPT

## 2019-10-23 PROCEDURE — 85610 PROTHROMBIN TIME: CPT

## 2019-10-23 PROCEDURE — 83880 ASSAY OF NATRIURETIC PEPTIDE: CPT

## 2019-10-23 PROCEDURE — 84484 ASSAY OF TROPONIN QUANT: CPT

## 2019-10-23 PROCEDURE — 99284 EMERGENCY DEPT VISIT MOD MDM: CPT

## 2019-10-23 PROCEDURE — 93005 ELECTROCARDIOGRAM TRACING: CPT

## 2019-10-23 PROCEDURE — 85025 COMPLETE CBC W/AUTO DIFF WBC: CPT

## 2019-10-23 PROCEDURE — 80053 COMPREHEN METABOLIC PANEL: CPT

## 2019-10-23 PROCEDURE — 82550 ASSAY OF CK (CPK): CPT

## 2019-10-23 PROCEDURE — 82553 CREATINE MB FRACTION: CPT

## 2019-10-23 PROCEDURE — 87086 URINE CULTURE/COLONY COUNT: CPT

## 2019-10-23 PROCEDURE — 81001 URINALYSIS AUTO W/SCOPE: CPT

## 2019-10-23 PROCEDURE — 71045 X-RAY EXAM CHEST 1 VIEW: CPT

## 2019-10-23 PROCEDURE — 83690 ASSAY OF LIPASE: CPT

## 2019-10-23 PROCEDURE — 70450 CT HEAD/BRAIN W/O DYE: CPT

## 2019-10-23 NOTE — DIAGNOSTIC IMAGING REPORT
Chest, 1 view,  10/23/2019.   

 



History: Hypertension, dizziness.



Comparison: None available.



Findings: The cardiomediastinal silhouette and pulmonary vasculature are within

normal limits for a portable exam. There is no focal consolidation or pleural

effusion. Mild degenerative changes are present in the shoulders. There are no

acute osseous or soft tissue abnormalities. 



Impression: 

No acute cardiopulmonary abnormality.



Signed by: Matt Marshall on 10/23/2019 12:45 PM

## 2019-10-23 NOTE — DIAGNOSTIC IMAGING REPORT
CT BRAIN WO



HISTORY: High blood pressure



COMPARISON:  None.



TECHNIQUE: 

Noncontrast axial scans were obtained from skull base to the vertex.  Coronal

and sagittal reconstructions obtained from the axial data.  One or more of the

following dose reduction techniques were used: Automated exposure control,

adjustment of the mA and/or kV according to patient size, and/or utilization of

iterative reconstruction technique.



DISCUSSION:



Scalp/Skull: Unremarkable.

Brain sulci: Appropriate for patient's age.

Ventricles: Normal in size and configuration.  No hydrocephalus.

Extra-axial spaces: No masses or fluid collections.  Carotid siphon and

vertebral artery calcifications are present.



Parenchyma: 

Mild periventricular white matter hypodensities are likely chronic

microvascular ischemic changes.

There is an old small lacunar infarct in the right caudate head.

Otherwise, no mass, hemorrhage, or large vascular territory acute infarct.



Dural sinuses:  No abnormal densities.

Sellar/Suprasellar region: Intact.

Skull base: Intact.

Incidental findings: None.



IMPRESSION:



1.  No acute intracranial abnormalities.

2.  Mild supratentorial chronic microvascular ischemic change.

3.  Old small right caudate head lacunar infarct.





Signed by: Dr. Taye Garcia M.D. on 10/23/2019 11:50 AM

## 2019-11-02 ENCOUNTER — HOSPITAL ENCOUNTER (INPATIENT)
Dept: HOSPITAL 88 - ER | Age: 69
LOS: 2 days | Discharge: HOME | DRG: 641 | End: 2019-11-04
Attending: INTERNAL MEDICINE | Admitting: INTERNAL MEDICINE
Payer: MEDICARE

## 2019-11-02 VITALS — DIASTOLIC BLOOD PRESSURE: 92 MMHG | SYSTOLIC BLOOD PRESSURE: 145 MMHG

## 2019-11-02 VITALS — SYSTOLIC BLOOD PRESSURE: 148 MMHG | DIASTOLIC BLOOD PRESSURE: 96 MMHG

## 2019-11-02 VITALS — BODY MASS INDEX: 33.64 KG/M2 | WEIGHT: 197.03 LBS | HEIGHT: 64 IN

## 2019-11-02 VITALS — DIASTOLIC BLOOD PRESSURE: 81 MMHG | SYSTOLIC BLOOD PRESSURE: 131 MMHG

## 2019-11-02 VITALS — SYSTOLIC BLOOD PRESSURE: 146 MMHG | DIASTOLIC BLOOD PRESSURE: 99 MMHG

## 2019-11-02 DIAGNOSIS — E78.00: ICD-10-CM

## 2019-11-02 DIAGNOSIS — E11.9: ICD-10-CM

## 2019-11-02 DIAGNOSIS — Z85.46: ICD-10-CM

## 2019-11-02 DIAGNOSIS — E87.6: Primary | ICD-10-CM

## 2019-11-02 DIAGNOSIS — L03.113: ICD-10-CM

## 2019-11-02 DIAGNOSIS — K21.9: ICD-10-CM

## 2019-11-02 DIAGNOSIS — E87.2: ICD-10-CM

## 2019-11-02 DIAGNOSIS — I10: ICD-10-CM

## 2019-11-02 LAB
ALBUMIN SERPL-MCNC: 3.3 G/DL (ref 3.5–5)
ALBUMIN/GLOB SERPL: 0.7 {RATIO} (ref 0.8–2)
ALP SERPL-CCNC: 66 IU/L (ref 40–150)
ALT SERPL-CCNC: 11 IU/L (ref 0–55)
ANION GAP SERPL CALC-SCNC: 13.1 MMOL/L (ref 8–16)
BACTERIA URNS QL MICRO: (no result) /HPF
BASOPHILS # BLD AUTO: 0 10*3/UL (ref 0–0.1)
BASOPHILS NFR BLD AUTO: 0.1 % (ref 0–1)
BILIRUB UR QL: NEGATIVE
BUN SERPL-MCNC: 30 MG/DL (ref 7–26)
BUN/CREAT SERPL: 33 (ref 6–25)
CALCIUM SERPL-MCNC: 9.7 MG/DL (ref 8.4–10.2)
CHLORIDE SERPL-SCNC: 100 MMOL/L (ref 98–107)
CK MB SERPL-MCNC: 0.9 NG/ML (ref 0–5)
CK MB SERPL-MCNC: 1 NG/ML (ref 0–5)
CK SERPL-CCNC: 27 IU/L (ref 30–200)
CK SERPL-CCNC: 28 IU/L (ref 30–200)
CLARITY UR: CLEAR
CO2 SERPL-SCNC: 25 MMOL/L (ref 22–29)
COLOR UR: YELLOW
DEPRECATED APTT PLAS QN: 32.2 SECONDS (ref 23.8–35.5)
DEPRECATED INR PLAS: 0.88
DEPRECATED NEUTROPHILS # BLD AUTO: 5 10*3/UL (ref 2.1–6.9)
EGFRCR SERPLBLD CKD-EPI 2021: > 60 ML/MIN (ref 60–?)
EOSINOPHIL # BLD AUTO: 0.3 10*3/UL (ref 0–0.4)
EOSINOPHIL NFR BLD AUTO: 4.5 % (ref 0–6)
EPI CELLS URNS QL MICRO: (no result) /LPF
ERYTHROCYTE [DISTWIDTH] IN CORD BLOOD: 18.7 % (ref 11.7–14.4)
GLOBULIN PLAS-MCNC: 4.6 G/DL (ref 2.3–3.5)
GLUCOSE SERPLBLD-MCNC: 165 MG/DL (ref 74–118)
HCT VFR BLD AUTO: 36.2 % (ref 38.2–49.6)
HGB BLD-MCNC: 11.1 G/DL (ref 14–18)
KETONES UR QL STRIP.AUTO: NEGATIVE
LEUKOCYTE ESTERASE UR QL STRIP.AUTO: NEGATIVE
LYMPHOCYTES # BLD: 0.9 10*3/UL (ref 1–3.2)
LYMPHOCYTES NFR BLD AUTO: 12.4 % (ref 18–39.1)
MAGNESIUM SERPL-MCNC: 1.9 MG/DL (ref 1.3–2.1)
MCH RBC QN AUTO: 26.9 PG (ref 28–32)
MCHC RBC AUTO-ENTMCNC: 30.7 G/DL (ref 31–35)
MCV RBC AUTO: 87.9 FL (ref 81–99)
MONOCYTES # BLD AUTO: 0.6 10*3/UL (ref 0.2–0.8)
MONOCYTES NFR BLD AUTO: 9.2 % (ref 4.4–11.3)
NEUTS SEG NFR BLD AUTO: 73.5 % (ref 38.7–80)
NITRITE UR QL STRIP.AUTO: NEGATIVE
PLATELET # BLD AUTO: 242 X10E3/UL (ref 140–360)
POTASSIUM SERPL-SCNC: 3.1 MMOL/L (ref 3.5–5.1)
PROT UR QL STRIP.AUTO: (no result)
PROTHROMBIN TIME: 12.4 SECONDS (ref 11.9–14.5)
RBC # BLD AUTO: 4.12 X10E6/UL (ref 4.3–5.7)
SODIUM SERPL-SCNC: 135 MMOL/L (ref 136–145)
SP GR UR STRIP: 1.01 (ref 1.01–1.02)
UROBILINOGEN UR STRIP-MCNC: 0.2 MG/DL (ref 0.2–1)
WBC #/AREA URNS HPF: (no result) /HPF (ref 0–5)

## 2019-11-02 PROCEDURE — 85651 RBC SED RATE NONAUTOMATED: CPT

## 2019-11-02 PROCEDURE — 82553 CREATINE MB FRACTION: CPT

## 2019-11-02 PROCEDURE — 80053 COMPREHEN METABOLIC PANEL: CPT

## 2019-11-02 PROCEDURE — 82550 ASSAY OF CK (CPK): CPT

## 2019-11-02 PROCEDURE — 87040 BLOOD CULTURE FOR BACTERIA: CPT

## 2019-11-02 PROCEDURE — 85610 PROTHROMBIN TIME: CPT

## 2019-11-02 PROCEDURE — 82948 REAGENT STRIP/BLOOD GLUCOSE: CPT

## 2019-11-02 PROCEDURE — 99284 EMERGENCY DEPT VISIT MOD MDM: CPT

## 2019-11-02 PROCEDURE — 84484 ASSAY OF TROPONIN QUANT: CPT

## 2019-11-02 PROCEDURE — 36415 COLL VENOUS BLD VENIPUNCTURE: CPT

## 2019-11-02 PROCEDURE — 85730 THROMBOPLASTIN TIME PARTIAL: CPT

## 2019-11-02 PROCEDURE — 80048 BASIC METABOLIC PNL TOTAL CA: CPT

## 2019-11-02 PROCEDURE — 80202 ASSAY OF VANCOMYCIN: CPT

## 2019-11-02 PROCEDURE — 85025 COMPLETE CBC W/AUTO DIFF WBC: CPT

## 2019-11-02 PROCEDURE — 96372 THER/PROPH/DIAG INJ SC/IM: CPT

## 2019-11-02 PROCEDURE — 83735 ASSAY OF MAGNESIUM: CPT

## 2019-11-02 PROCEDURE — 81001 URINALYSIS AUTO W/SCOPE: CPT

## 2019-11-02 PROCEDURE — 83605 ASSAY OF LACTIC ACID: CPT

## 2019-11-02 RX ADMIN — TRAMADOL HYDROCHLORIDE PRN MG: 50 TABLET, FILM COATED ORAL at 20:14

## 2019-11-02 RX ADMIN — TAZOBACTAM SODIUM AND PIPERACILLIN SODIUM SCH MLS/HR: 375; 3 INJECTION, SOLUTION INTRAVENOUS at 22:18

## 2019-11-02 RX ADMIN — ATORVASTATIN CALCIUM SCH MG: 10 TABLET, FILM COATED ORAL at 20:14

## 2019-11-02 RX ADMIN — INSULIN HUMAN SCH UNIT: 100 INJECTION, SOLUTION PARENTERAL at 20:10

## 2019-11-02 RX ADMIN — CARVEDILOL SCH MG: 12.5 TABLET, FILM COATED ORAL at 22:00

## 2019-11-02 RX ADMIN — INSULIN HUMAN SCH UNIT: 100 INJECTION, SOLUTION PARENTERAL at 16:30

## 2019-11-02 RX ADMIN — TAZOBACTAM SODIUM AND PIPERACILLIN SODIUM SCH MLS/HR: 375; 3 INJECTION, SOLUTION INTRAVENOUS at 21:30

## 2019-11-02 RX ADMIN — Medication SCH MG: at 20:14

## 2019-11-02 NOTE — DIAGNOSTIC IMAGING REPORT
ELBOW RIGHT COMPLETE - 3 views



HISTORY:  Pain. Cellulitis.

COMPARISON: None available.

     

FINDINGS:

Bones:

No acute displaced fracture.  

Osseous alignment is within normal limits.



Joints:

The joint spaces are well-maintained.



Soft tissues:

Soft tissue swelling.





IMPRESSION: 

No acute osseous abnormality..



Signed by: Dr. LUIS ARMANDO Cedeno M.D. on 11/2/2019 2:51 PM

## 2019-11-03 VITALS — SYSTOLIC BLOOD PRESSURE: 125 MMHG | DIASTOLIC BLOOD PRESSURE: 81 MMHG

## 2019-11-03 VITALS — SYSTOLIC BLOOD PRESSURE: 122 MMHG | DIASTOLIC BLOOD PRESSURE: 76 MMHG

## 2019-11-03 VITALS — SYSTOLIC BLOOD PRESSURE: 121 MMHG | DIASTOLIC BLOOD PRESSURE: 88 MMHG

## 2019-11-03 VITALS — SYSTOLIC BLOOD PRESSURE: 127 MMHG | DIASTOLIC BLOOD PRESSURE: 78 MMHG

## 2019-11-03 VITALS — DIASTOLIC BLOOD PRESSURE: 78 MMHG | SYSTOLIC BLOOD PRESSURE: 127 MMHG

## 2019-11-03 VITALS — DIASTOLIC BLOOD PRESSURE: 79 MMHG | SYSTOLIC BLOOD PRESSURE: 130 MMHG

## 2019-11-03 LAB
ALBUMIN SERPL-MCNC: 3.2 G/DL (ref 3.5–5)
ALBUMIN/GLOB SERPL: 0.7 {RATIO} (ref 0.8–2)
ALP SERPL-CCNC: 61 IU/L (ref 40–150)
ALT SERPL-CCNC: 11 IU/L (ref 0–55)
ANION GAP SERPL CALC-SCNC: 13.1 MMOL/L (ref 8–16)
BASOPHILS # BLD AUTO: 0 10*3/UL (ref 0–0.1)
BASOPHILS NFR BLD AUTO: 0.1 % (ref 0–1)
BUN SERPL-MCNC: 18 MG/DL (ref 7–26)
BUN/CREAT SERPL: 24 (ref 6–25)
CALCIUM SERPL-MCNC: 9.5 MG/DL (ref 8.4–10.2)
CHLORIDE SERPL-SCNC: 103 MMOL/L (ref 98–107)
CK MB SERPL-MCNC: 0.8 NG/ML (ref 0–5)
CK SERPL-CCNC: 25 IU/L (ref 30–200)
CO2 SERPL-SCNC: 25 MMOL/L (ref 22–29)
DEPRECATED NEUTROPHILS # BLD AUTO: 5.2 10*3/UL (ref 2.1–6.9)
EGFRCR SERPLBLD CKD-EPI 2021: > 60 ML/MIN (ref 60–?)
EOSINOPHIL # BLD AUTO: 0.3 10*3/UL (ref 0–0.4)
EOSINOPHIL NFR BLD AUTO: 4.6 % (ref 0–6)
ERYTHROCYTE [DISTWIDTH] IN CORD BLOOD: 18.6 % (ref 11.7–14.4)
GLOBULIN PLAS-MCNC: 4.6 G/DL (ref 2.3–3.5)
GLUCOSE SERPLBLD-MCNC: 123 MG/DL (ref 74–118)
HCT VFR BLD AUTO: 35.8 % (ref 38.2–49.6)
HGB BLD-MCNC: 10.9 G/DL (ref 14–18)
LYMPHOCYTES # BLD: 1 10*3/UL (ref 1–3.2)
LYMPHOCYTES NFR BLD AUTO: 14 % (ref 18–39.1)
MCH RBC QN AUTO: 26.8 PG (ref 28–32)
MCHC RBC AUTO-ENTMCNC: 30.4 G/DL (ref 31–35)
MCV RBC AUTO: 88 FL (ref 81–99)
MONOCYTES # BLD AUTO: 0.6 10*3/UL (ref 0.2–0.8)
MONOCYTES NFR BLD AUTO: 8.9 % (ref 4.4–11.3)
NEUTS SEG NFR BLD AUTO: 72 % (ref 38.7–80)
PLATELET # BLD AUTO: 259 X10E3/UL (ref 140–360)
POTASSIUM SERPL-SCNC: 3.1 MMOL/L (ref 3.5–5.1)
RBC # BLD AUTO: 4.07 X10E6/UL (ref 4.3–5.7)
SODIUM SERPL-SCNC: 138 MMOL/L (ref 136–145)

## 2019-11-03 RX ADMIN — CARVEDILOL SCH MG: 12.5 TABLET, FILM COATED ORAL at 05:31

## 2019-11-03 RX ADMIN — Medication SCH MG: at 20:29

## 2019-11-03 RX ADMIN — VANCOMYCIN HYDROCHLORIDE SCH MLS/HR: 1 INJECTION, SOLUTION INTRAVENOUS at 15:35

## 2019-11-03 RX ADMIN — INSULIN HUMAN SCH UNIT: 100 INJECTION, SOLUTION PARENTERAL at 17:43

## 2019-11-03 RX ADMIN — CARVEDILOL SCH MG: 12.5 TABLET, FILM COATED ORAL at 22:00

## 2019-11-03 RX ADMIN — TAZOBACTAM SODIUM AND PIPERACILLIN SODIUM SCH MLS/HR: 375; 3 INJECTION, SOLUTION INTRAVENOUS at 17:41

## 2019-11-03 RX ADMIN — ATORVASTATIN CALCIUM SCH MG: 10 TABLET, FILM COATED ORAL at 20:29

## 2019-11-03 RX ADMIN — INSULIN HUMAN SCH UNIT: 100 INJECTION, SOLUTION PARENTERAL at 20:29

## 2019-11-03 RX ADMIN — Medication SCH MG: at 08:00

## 2019-11-03 RX ADMIN — TRAMADOL HYDROCHLORIDE PRN MG: 50 TABLET, FILM COATED ORAL at 03:00

## 2019-11-03 RX ADMIN — POTASSIUM CHLORIDE SCH MEQ: 1500 TABLET, EXTENDED RELEASE ORAL at 08:00

## 2019-11-03 RX ADMIN — AMLODIPINE BESYLATE SCH MG: 10 TABLET ORAL at 08:00

## 2019-11-03 RX ADMIN — CARVEDILOL SCH MG: 12.5 TABLET, FILM COATED ORAL at 14:24

## 2019-11-03 RX ADMIN — Medication SCH MG: at 15:35

## 2019-11-03 RX ADMIN — TAZOBACTAM SODIUM AND PIPERACILLIN SODIUM SCH MLS/HR: 375; 3 INJECTION, SOLUTION INTRAVENOUS at 23:30

## 2019-11-03 RX ADMIN — INSULIN HUMAN SCH UNIT: 100 INJECTION, SOLUTION PARENTERAL at 12:12

## 2019-11-03 RX ADMIN — TAZOBACTAM SODIUM AND PIPERACILLIN SODIUM SCH MLS/HR: 375; 3 INJECTION, SOLUTION INTRAVENOUS at 12:11

## 2019-11-03 RX ADMIN — ASPIRIN 81 MG CHEWABLE TABLET SCH MG: 81 TABLET CHEWABLE at 08:00

## 2019-11-03 RX ADMIN — TRAMADOL HYDROCHLORIDE PRN MG: 50 TABLET, FILM COATED ORAL at 20:36

## 2019-11-03 RX ADMIN — INSULIN HUMAN SCH UNIT: 100 INJECTION, SOLUTION PARENTERAL at 08:00

## 2019-11-03 RX ADMIN — PANTOPRAZOLE SODIUM SCH MG: 40 TABLET, DELAYED RELEASE ORAL at 08:00

## 2019-11-03 RX ADMIN — TAZOBACTAM SODIUM AND PIPERACILLIN SODIUM SCH MLS/HR: 375; 3 INJECTION, SOLUTION INTRAVENOUS at 05:00

## 2019-11-03 NOTE — PROGRESS NOTE
DATE:  11/03/2019  

 

CHIEF COMPLAINT:  Right upper extremity edema and pain.

 

SUBJECTIVE:  The patient seen in the room, afebrile, vital signs stable, reports right

elbow or forearm pain is improving.  Did not need any pain medication today.  Able to

move his arm more today. 

 

PHYSICAL EXAMINATION:

VITAL SIGNS:  Temperature 96.1, pulse is 72, respirations 18, blood pressure 121/88, and

pulse ox is 96% on room air. 

GENERAL:  No acute distress. 

HEENT:  Normocephalic and atraumatic. 

NECK:  Supple. 

LUNGS:  Clear to auscultation. 

CARDIOVASCULAR:  Regular rate and rhythm. 

GI:  Soft and nontender. 

NEUROLOGIC:  Alert, awake, and oriented x3. 

MUSCULOSKELETAL:  Right upper extremity swelling and limited ROM due to pain. 

SKIN:  Swelling in the right upper extremity, no rash or laceration noted.

LABORATORY DATA:  WBC 7.1, hemoglobin is 10.9, hematocrit is 35.8, and platelet is 259.

ESR 68.  Sodium 138, potassium 3.1, and creatinine is 0.76.  Estimated GFR is greater

than 60. 

 

IMPRESSION:  

1. Right upper extremity cellulitis.  We will continue with vancomycin and Zosyn for

cellulitis.  Pain management with tramadol. 

2. Hypokalemia.  We will replace and recheck in the morning.

3. Hypertension.  We will resume home medication.

4. Diabetes type 2.  We will check blood sugar and cover with sliding scale insulin.

5. High cholesterol.  Continue on statin.

6. Gastroesophageal reflux disease.  Continue omeprazole.

7. History of prostate cancer, aware.

8. Deep vein thrombosis prophylaxis.  Heparin subcutaneous b.i.d.

 

 

Dictated by JUSTO Chamorro

 

______________________________

Atul Shelton MD

 

MY/MODL

D:  11/03/2019 14:09:01

T:  11/03/2019 15:16:40

Job #:  [id number]

## 2019-11-03 NOTE — HISTORY AND PHYSICAL
PRIMARY CARE PHYSICIAN:  Wilder Almonte MD at [facility].

 

CHIEF COMPLAINT:  Right upper extremity redness and pain.

 

HISTORY OF PRESENT ILLNESS:  This is a 69-year-old male with past medical history of

hypertension, diabetes, high cholesterol, and GERD, who presented to the ER with

complaints of right upper extremity elbow swelling and pain.  He reports he has been

progressively getting worse for the past four days.  __________ insect bite, or

laceration to the site.  He denies any fever, chills, nausea, or vomiting.  He reports

the pain has worsened to a point where he is not able to move his elbow due to pain.  No

rash or laceration noted.  We will admit the patient for further evaluation. 

 

PAST MEDICAL HISTORY:  

1. Hypertension.

2. High cholesterol.

3. Diabetes type 2.

4. Gastroesophageal reflux disease.

5. Prostate cancer.

 

PAST SURGICAL HISTORY:  He had prostatectomy with right groin DOMINGA drain x2.

 

FAMILY MEDICAL HISTORY:  He reports mother had diabetes.  Father, unknown.

 

SOCIAL HISTORY:  He denies any tobacco, alcohol, or illicit drug use.

 

ALLERGIES:  HE HAS NO KNOWN ALLERGIES.

 

REVIEW OF SYSTEMS:

GENERAL:  No acute distress. 

HEENT:  No trauma to the head or sore to the mouth. 

LUNGS:  No shortness of breath or cough. 

CARDIOVASCULAR:  No chest pain or palpitations. GI:  No nausea, vomiting, or abdominal

pain. 

NEUROLOGIC:  Alert and oriented.  No weakness. 

MUSCULOSKELETAL:  Right upper extremity decreased mobility due to swelling and pain. 

SKIN:  Right upper extremity swelling.

 

PHYSICAL EXAMINATION:

VITAL SIGNS:  Temperature 97.2, pulse is 90, respirations 18, blood pressure is 133/92,

pulse ox is 97% on room air. 

GENERAL:  No acute distress. 

HEENT:  Normocephalic, atraumatic. 

NECK:  Supple. 

LUNGS:  Clear to auscultation. 

CARDIOVASCULAR:  Regular rate and rhythm. 

GI:  Soft and nontender. 

NEUROLOGIC:  Alert, awake, oriented x3. 

MUSCULOSKELETAL:  Right upper extremity, limited ROM due to pain.  All others intact. 

SKIN:  Swelling at the right upper extremity __________ rash or laceration. 

PSYCH: __________.

LABORATORY DATA:  WBC 11.1, hematocrit 36.2, platelets __________  potassium 3.1.  Bun

is 30, creatinine is 0.92, estimated GFR is greater than 60.  Blood glucose 165, lactic

acid 2.1 and trended down to 1.8, magnesium 1.9.  AST 12, ALT 11.  Creatinine kinase 28,

CK-MB 1.0, troponin 0.001, total protein 7.9, albumin 3.3.  UA, clear and yellow with

__________ protein, negative for leukocyte esterase with 10 to 20 RBCs, no WBCs or

bacteria.  Bacteria moderate.  Blood culture is pending. 

 

IMAGING:  X-ray of the right elbow, three view, showed no acute osseous abnormalities,

but showed soft tissue swelling. 

 

IMPRESSION AND PLAN:  

1. Right upper extremity swelling and pain, likely due to cellulitis.  X-ray showed no

osseous abnormalities.  Afebrile.  We will continue with vanc and Zosyn for cellulitis.

Pain management with tramadol. 

2. Hypokalemia, he was given 40 mEq of potassium.  We will recheck labs in the morning.

3. Lactic acidosis with lactic acid of 2.1.  The patient is afebrile, vital signs are

stable and trended down to 1.8. 

4. Hypertension.  We will resume home medications.

5. Diabetes type 2.  We will hold p.o. diabetic medications and cover with sliding scale

insulin. 

6. High cholesterol.  We will continue on statin.

7. Gastroesophageal reflux disease.  We will continue on omeprazole.

8. History of prostate cancer, aware.

9. Deep venous thrombosis prophylaxis.  Heparin subcu b.i.d.

 

 

Dictated by JUSTO Chamorro

 

______________________________

Atul Shelton MD

 

MY/MODL

D:  11/02/2019 16:42:17

T:  11/02/2019 22:27:33

Job #:  [id number]

## 2019-11-04 VITALS — DIASTOLIC BLOOD PRESSURE: 81 MMHG | SYSTOLIC BLOOD PRESSURE: 130 MMHG

## 2019-11-04 VITALS — SYSTOLIC BLOOD PRESSURE: 160 MMHG | DIASTOLIC BLOOD PRESSURE: 90 MMHG

## 2019-11-04 VITALS — DIASTOLIC BLOOD PRESSURE: 89 MMHG | SYSTOLIC BLOOD PRESSURE: 132 MMHG

## 2019-11-04 VITALS — DIASTOLIC BLOOD PRESSURE: 80 MMHG | SYSTOLIC BLOOD PRESSURE: 141 MMHG

## 2019-11-04 VITALS — SYSTOLIC BLOOD PRESSURE: 141 MMHG | DIASTOLIC BLOOD PRESSURE: 80 MMHG

## 2019-11-04 LAB
ANION GAP SERPL CALC-SCNC: 13.4 MMOL/L (ref 8–16)
BUN SERPL-MCNC: 15 MG/DL (ref 7–26)
BUN/CREAT SERPL: 19 (ref 6–25)
CALCIUM SERPL-MCNC: 8.9 MG/DL (ref 8.4–10.2)
CHLORIDE SERPL-SCNC: 104 MMOL/L (ref 98–107)
CO2 SERPL-SCNC: 27 MMOL/L (ref 22–29)
EGFRCR SERPLBLD CKD-EPI 2021: > 60 ML/MIN (ref 60–?)
GLUCOSE SERPLBLD-MCNC: 134 MG/DL (ref 74–118)
POTASSIUM SERPL-SCNC: 3.4 MMOL/L (ref 3.5–5.1)
SODIUM SERPL-SCNC: 141 MMOL/L (ref 136–145)

## 2019-11-04 RX ADMIN — TAZOBACTAM SODIUM AND PIPERACILLIN SODIUM SCH MLS/HR: 375; 3 INJECTION, SOLUTION INTRAVENOUS at 06:00

## 2019-11-04 RX ADMIN — TAZOBACTAM SODIUM AND PIPERACILLIN SODIUM SCH MLS/HR: 375; 3 INJECTION, SOLUTION INTRAVENOUS at 11:31

## 2019-11-04 RX ADMIN — CARVEDILOL SCH MG: 12.5 TABLET, FILM COATED ORAL at 14:04

## 2019-11-04 RX ADMIN — PANTOPRAZOLE SODIUM SCH MG: 40 TABLET, DELAYED RELEASE ORAL at 08:58

## 2019-11-04 RX ADMIN — Medication SCH MG: at 08:58

## 2019-11-04 RX ADMIN — ASPIRIN 81 MG CHEWABLE TABLET SCH MG: 81 TABLET CHEWABLE at 08:59

## 2019-11-04 RX ADMIN — CARVEDILOL SCH MG: 12.5 TABLET, FILM COATED ORAL at 06:00

## 2019-11-04 RX ADMIN — Medication SCH MG: at 08:59

## 2019-11-04 RX ADMIN — POTASSIUM CHLORIDE SCH MEQ: 1500 TABLET, EXTENDED RELEASE ORAL at 08:59

## 2019-11-04 RX ADMIN — VANCOMYCIN HYDROCHLORIDE SCH MLS/HR: 1 INJECTION, SOLUTION INTRAVENOUS at 03:30

## 2019-11-04 RX ADMIN — AMLODIPINE BESYLATE SCH MG: 10 TABLET ORAL at 08:58

## 2019-11-04 RX ADMIN — INSULIN HUMAN SCH UNIT: 100 INJECTION, SOLUTION PARENTERAL at 11:30

## 2019-11-04 RX ADMIN — INSULIN HUMAN SCH UNIT: 100 INJECTION, SOLUTION PARENTERAL at 07:30

## 2019-11-04 NOTE — NUR
BEDSIDE ROUNDS COMPLETE NO DISTRESS NOTED, UPDATED ON POC VOICED UNDERSTANDING, DENIES PAIN 
AT THIS TIME, CALL LIGHT IN REACH WILL CONTINUE TO MONITOR

## 2019-11-04 NOTE — DIAGNOSTIC IMAGING REPORT
TECHNIQUE:

Continued tomography imaging of the RIGHT ELBOW was performed with injected

contrast. Dose modulation, iterative reconstruction, and/or weight based

adjustment of the mA/kV was utilized to reduce the radiation dose to as low as

reasonably achievable. 



HISTORY:  Pain and swelling



COMPARISON: None available.



FINDINGS:



Skin thickening and subcutaneous edema around the posterior aspect of the

elbow.



No abscess.



No significant joint effusion.



No acute fracture or cortical erosion to suggest osteomyelitis. Scattered

calcifications along the common flexor origin and triceps insertion.



IMPRESSION:



Cellulitis around the posterior elbow



Signed by: Dr. Andrew Palisch, M.D. on 11/4/2019 1:26 PM

## 2019-11-05 NOTE — DISCHARGE SUMMARY
PRIMARY CARE PHYSICIAN:  Dr. Wilder Almonte at [facility].

 

FINAL DIAGNOSES:  

1. Right upper extremity cellulitis.

2. Hypokalemia.

3. Hypertension.

4. Diabetes type 2.

5. High cholesterol.

6. Gastroesophageal reflux disease.

7. History of prostate carcinoma.

 

CONSULTANTS:  None.

 

PROCEDURES:  None.

 

HISTORY:  Per HPI.

 

HOSPITAL COURSE:  This is a 69-year-old male with past medical history of hypertension,

diabetes, high cholesterol, and prostate CA, presented with right upper extremity

swelling and pain.  X-ray showed a soft tissue swelling with no acute osseous

abnormality.  He was started on vancomycin and Zosyn for treatment of cellulitis.

Cultures were sent, which were negative in 48 hours.  There was no white count

increased.  CT of the upper extremity showed cellulitis at the posterior elbow with no

significant joint effusion or no fracture or erosion to suggest osteomyelitis.  The pain

is much improved today.  He is able to move his arm/elbow more with some pain.  We will

discharge home on clindamycin for cellulitis. 

 

PHYSICAL EXAMINATION:

VITAL SIGNS:  Temperature is 97.2, pulse is 75, respirations 18, blood pressure 160/90,

pulse is 98% on room air. 

GENERAL:  No acute distress. 

HEENT:  Normocephalic, atraumatic. 

NECK:  Supple and midline. 

LUNGS:  Clear to auscultation. 

CARDIOVASCULAR:  Regular rate and rhythm. 

GI:  Soft and nontender. 

NEUROLOGIC:  Alert, awake, and oriented x3. 

MUSCULOSKELETAL:  Right upper extremity swelling with improved range of motion. 

SKIN:  Right forearm elbow area swelling.  No redness.

CONDITION AT DISCHARGE:  Stable and improved.

 

DISCHARGE MEDICATIONS:  See medication reconciliation list, added clindamycin for

completion of therapy. 

 

FOLLOWUP:  With primary care doctor in 1 to 2 weeks.

 

TIME SPENT:  Total time of discharge is 35 minutes.

 

 

Dictated by JUSTO Chamorro

 

______________________________

Atul Shleton MD

 

MY/MODL

D:  11/04/2019 18:50:37

T:  11/05/2019 02:03:46

Job #:  [id number]

 

cc:            Wilder Almonte MD

## 2020-11-09 ENCOUNTER — HOSPITAL ENCOUNTER (INPATIENT)
Dept: HOSPITAL 88 - ER | Age: 70
LOS: 7 days | Discharge: HOME | DRG: 418 | End: 2020-11-16
Attending: INTERNAL MEDICINE | Admitting: INTERNAL MEDICINE
Payer: MEDICARE

## 2020-11-09 VITALS — WEIGHT: 218 LBS | HEIGHT: 64 IN | BODY MASS INDEX: 37.22 KG/M2

## 2020-11-09 DIAGNOSIS — I10: ICD-10-CM

## 2020-11-09 DIAGNOSIS — K80.10: ICD-10-CM

## 2020-11-09 DIAGNOSIS — N40.0: ICD-10-CM

## 2020-11-09 DIAGNOSIS — K21.9: ICD-10-CM

## 2020-11-09 DIAGNOSIS — E78.5: ICD-10-CM

## 2020-11-09 DIAGNOSIS — Z85.46: ICD-10-CM

## 2020-11-09 DIAGNOSIS — E11.9: ICD-10-CM

## 2020-11-09 DIAGNOSIS — Z20.828: ICD-10-CM

## 2020-11-09 DIAGNOSIS — Z83.3: ICD-10-CM

## 2020-11-09 DIAGNOSIS — K80.70: ICD-10-CM

## 2020-11-09 DIAGNOSIS — N17.9: ICD-10-CM

## 2020-11-09 DIAGNOSIS — K85.10: Primary | ICD-10-CM

## 2020-11-09 DIAGNOSIS — Z79.84: ICD-10-CM

## 2020-11-09 DIAGNOSIS — Z87.440: ICD-10-CM

## 2020-11-09 LAB
ALBUMIN SERPL-MCNC: 3.8 G/DL (ref 3.5–5)
ALBUMIN/GLOB SERPL: 1.2 {RATIO} (ref 0.8–2)
ALP SERPL-CCNC: 59 IU/L (ref 40–150)
ALT SERPL-CCNC: 22 IU/L (ref 0–55)
AMYLASE SERPL-CCNC: 127 U/L (ref 25–125)
ANION GAP SERPL CALC-SCNC: 16.7 MMOL/L (ref 8–16)
BACTERIA URNS QL MICRO: (no result) /HPF
BASOPHILS # BLD AUTO: 0 10*3/UL (ref 0–0.1)
BASOPHILS NFR BLD AUTO: 0.1 % (ref 0–1)
BILIRUB UR QL: NEGATIVE
BUN SERPL-MCNC: 40 MG/DL (ref 7–26)
BUN/CREAT SERPL: 31 (ref 6–25)
CALCIUM SERPL-MCNC: 9.1 MG/DL (ref 8.4–10.2)
CHLORIDE SERPL-SCNC: 96 MMOL/L (ref 98–107)
CLARITY UR: CLEAR
CO2 SERPL-SCNC: 28 MMOL/L (ref 22–29)
COLOR UR: YELLOW
DEPRECATED NEUTROPHILS # BLD AUTO: 5.1 10*3/UL (ref 2.1–6.9)
DEPRECATED RBC URNS MANUAL-ACNC: (no result) /HPF (ref 0–5)
EGFRCR SERPLBLD CKD-EPI 2021: 54 ML/MIN (ref 60–?)
EOSINOPHIL # BLD AUTO: 0.4 10*3/UL (ref 0–0.4)
EOSINOPHIL NFR BLD AUTO: 6.3 % (ref 0–6)
EPI CELLS URNS QL MICRO: (no result) /LPF
ERYTHROCYTE [DISTWIDTH] IN CORD BLOOD: 15.4 % (ref 11.7–14.4)
GLOBULIN PLAS-MCNC: 3.3 G/DL (ref 2.3–3.5)
GLUCOSE SERPLBLD-MCNC: 194 MG/DL (ref 74–118)
HCT VFR BLD AUTO: 45.4 % (ref 38.2–49.6)
HGB BLD-MCNC: 14.7 G/DL (ref 14–18)
KETONES UR QL STRIP.AUTO: NEGATIVE
LEUKOCYTE ESTERASE UR QL STRIP.AUTO: NEGATIVE
LIPASE SERPL-CCNC: 121 U/L (ref 8–78)
LYMPHOCYTES # BLD: 0.8 10*3/UL (ref 1–3.2)
LYMPHOCYTES NFR BLD AUTO: 11.9 % (ref 18–39.1)
MCH RBC QN AUTO: 28.2 PG (ref 28–32)
MCHC RBC AUTO-ENTMCNC: 32.4 G/DL (ref 31–35)
MCV RBC AUTO: 87 FL (ref 81–99)
MONOCYTES # BLD AUTO: 0.6 10*3/UL (ref 0.2–0.8)
MONOCYTES NFR BLD AUTO: 8 % (ref 4.4–11.3)
NEUTS SEG NFR BLD AUTO: 72.8 % (ref 38.7–80)
NITRITE UR QL STRIP.AUTO: NEGATIVE
PLATELET # BLD AUTO: 253 X10E3/UL (ref 140–360)
POTASSIUM SERPL-SCNC: 3.7 MMOL/L (ref 3.5–5.1)
PROT UR QL STRIP.AUTO: >=300
RBC # BLD AUTO: 5.22 X10E6/UL (ref 4.3–5.7)
SODIUM SERPL-SCNC: 137 MMOL/L (ref 136–145)
SP GR UR STRIP: >=1.03 (ref 1.01–1.02)
UROBILINOGEN UR STRIP-MCNC: 0.2 MG/DL (ref 0.2–1)
WBC #/AREA URNS HPF: (no result) /HPF (ref 0–5)

## 2020-11-09 PROCEDURE — 88304 TISSUE EXAM BY PATHOLOGIST: CPT

## 2020-11-09 PROCEDURE — 83690 ASSAY OF LIPASE: CPT

## 2020-11-09 PROCEDURE — 80053 COMPREHEN METABOLIC PANEL: CPT

## 2020-11-09 PROCEDURE — 76705 ECHO EXAM OF ABDOMEN: CPT

## 2020-11-09 PROCEDURE — 81001 URINALYSIS AUTO W/SCOPE: CPT

## 2020-11-09 PROCEDURE — 99284 EMERGENCY DEPT VISIT MOD MDM: CPT

## 2020-11-09 PROCEDURE — 82150 ASSAY OF AMYLASE: CPT

## 2020-11-09 PROCEDURE — 82948 REAGENT STRIP/BLOOD GLUCOSE: CPT

## 2020-11-09 PROCEDURE — 74177 CT ABD & PELVIS W/CONTRAST: CPT

## 2020-11-09 PROCEDURE — 78227 HEPATOBIL SYST IMAGE W/DRUG: CPT

## 2020-11-09 PROCEDURE — 74181 MRI ABDOMEN W/O CONTRAST: CPT

## 2020-11-09 PROCEDURE — 85025 COMPLETE CBC W/AUTO DIFF WBC: CPT

## 2020-11-09 PROCEDURE — 36415 COLL VENOUS BLD VENIPUNCTURE: CPT

## 2020-11-09 PROCEDURE — 80048 BASIC METABOLIC PNL TOTAL CA: CPT

## 2020-11-09 PROCEDURE — 74018 RADEX ABDOMEN 1 VIEW: CPT

## 2020-11-10 VITALS — SYSTOLIC BLOOD PRESSURE: 137 MMHG | DIASTOLIC BLOOD PRESSURE: 85 MMHG

## 2020-11-10 VITALS — DIASTOLIC BLOOD PRESSURE: 83 MMHG | SYSTOLIC BLOOD PRESSURE: 136 MMHG

## 2020-11-10 VITALS — DIASTOLIC BLOOD PRESSURE: 88 MMHG | SYSTOLIC BLOOD PRESSURE: 126 MMHG

## 2020-11-10 VITALS — DIASTOLIC BLOOD PRESSURE: 85 MMHG | SYSTOLIC BLOOD PRESSURE: 137 MMHG

## 2020-11-10 LAB
AMYLASE SERPL-CCNC: 100 U/L (ref 25–125)
LIPASE SERPL-CCNC: 112 U/L (ref 8–78)

## 2020-11-10 RX ADMIN — SODIUM CHLORIDE SCH MLS/HR: 9 INJECTION, SOLUTION INTRAVENOUS at 11:55

## 2020-11-10 RX ADMIN — INSULIN HUMAN SCH UNIT: 100 INJECTION, SOLUTION PARENTERAL at 20:28

## 2020-11-10 RX ADMIN — SODIUM CHLORIDE SCH MLS/HR: 9 INJECTION, SOLUTION INTRAVENOUS at 20:00

## 2020-11-10 RX ADMIN — TAZOBACTAM SODIUM AND PIPERACILLIN SODIUM SCH GM: 375; 3 INJECTION, SOLUTION INTRAVENOUS at 22:00

## 2020-11-10 RX ADMIN — INSULIN HUMAN SCH UNIT: 100 INJECTION, SOLUTION PARENTERAL at 16:44

## 2020-11-10 RX ADMIN — TAZOBACTAM SODIUM AND PIPERACILLIN SODIUM SCH GM: 375; 3 INJECTION, SOLUTION INTRAVENOUS at 05:53

## 2020-11-10 RX ADMIN — TAZOBACTAM SODIUM AND PIPERACILLIN SODIUM SCH GM: 375; 3 INJECTION, SOLUTION INTRAVENOUS at 14:35

## 2020-11-10 RX ADMIN — SODIUM CHLORIDE SCH MLS/HR: 9 INJECTION, SOLUTION INTRAVENOUS at 05:53

## 2020-11-10 NOTE — XMS REPORT
Clinical Summary

                             Created on: 11/10/2020



Ciaran Lacyo

External Reference #: [id number]

: 1950

Sex: Male



Demographics





                          Address                   [address] [place]  [address]

 

                          Home Phone                [phone]

 

                          Preferred Language        Unknown

 

                          Marital Status            

 

                          Alevism Affiliation     Unknown

 

                          Race                      White

 

                          Ethnic Group              /Latin





Author





                          Author                    [facility]ar [facility]

 

                          Organization              [facility]

 

                          Address                   Unknown

 

                          Phone                     Unavailable







Support





                Name            Relationship    Address         Phone

 

                Dania Mendez ECON            Unknown         [phone]

[phone]







Care Team Providers





                    Care Team Member Name Role                Phone

 

                    Chapincito Naranjo MD PCP                 [phone]







Allergies

No Known Active Allergies



Medications





                          End Date                  Status



              Medication   Sig          Dispensed    Refills      Start  



                                         Date  

 

                                                    Active



                     metFORMIN (GLUCOPHAGE)  Take 1,000 mg       0   



                           500 mg tablet             by mouth 2     



                                         (two) times a     



                                         day with     



                                         meals.     

 

                                                    Active



                     lisinopril          Take 40 mg by       0   



                           (PRINIVIL,ZESTRIL) 10 mg  mouth 2 (two)     



                           tablet                    times a day.     

 

                                                    Active



                     carvedilol (COREG) 12.5  Take 6.25 mg        0   



                           MG tablet                 by mouth 2     



                                         (two) times a     



                                         day with     



                                         meals. 1/2     



                                         tablet..     



                                         018. Dose is     



                                         3.125mg 2 x a     



                                         day     

 

                                                    Active



                     atorvastatin (LIPITOR) 10  Take 20 mg by       0   



                           MG tablet                 mouth     



                                         nightly.     

 

                                                    Active



                     aspirin (ECOTRIN) 81 MG  Take 81 mg by       0   



                           enteric coated tablet     mouth daily.     

 

                                                    Active



                 amLODIPine (NORVASC) 10  TOME HELEN        0               07/14/

201  



                     mg tablet           TABLETA TODOS       8  



                                         LOS D?AS     

 

                                                    Active



                 JARDIANCE 25 mg tablet  TOME HELEN        0               /2

01  



                           TABLETA TODOS             8  



                                         LOS D?AS  EN     



                                         LA MA?ENDER     

 

                                                    Active



                 GLYXAMBI 25-5 mg tablet  Take 1 tablet   0               07/16/

201  



                           by mouth                  8  



                                         daily.     

 

                                                    Active



                     LANSOPRAZOLE ORAL   Take by             0   



                                         mouth.     







Active Problems





 



                           Problem                   Noted Date

 

 



                           Prostate cancer           2018







Surgical History





   



                 Surgery         Date            Site/Laterality  Comments

 

   



                                         PROSTATE BIOPSY   

 

   



                 PROSTATECTOMY,  2018       N/A             Procedure: ROBO

TIC ASSISTED LAPAROSCOPIC



                           LAPAROSCOPIC,             PROSTATECTOMY, BILATERAL PE

LVIC LYMPH NODE



                           ROBOT-ASSISTED            DISSECTION, URETHROPEXY;  S

urgeon: Chandler Dowling MD;  Location: [facility] MAIN O

R;



                                         Service: Urology;  Laterality: N/A;



                                         Medical devices from this surgery are i

n the



                                         Implants section.







Medical History





  



                     Medical History     Date                Comments

 

  



                                         Diabetes mellitus (HCC)  

 

  



                           Prostate cancer (HCC)     denies chemo or xrt

 

  



                           Anesthesia                Pt...nphap, CFROM / Family.

...nfhap

 

  



                           Hypertension              denies sob, cp, mi, palpita

tion etc

 

  



                           GERD (gastroesophageal reflux disease)   medication m

anaged

 

  



                                         Hypercholesteremia  







Social History





                                        Date



                 Tobacco Use     Types           Packs/Day       Years Used 

 

                                         



                                         Never Smoker    

 

    



                                         Smokeless Tobacco: Never   



                                         Used   







                                        Tobacco Cessation: Counseling Given: No









                    Drinks/Week         oz/Week             Comments



                                         Alcohol Use   

 

                                                             



                                         No   







 



                           Sex Assigned at Birth     Date Recorded

 

 



                                         Not on file 







Last Filed Vital Signs

Not on file



Plan of Treatment





   



                 Health Maintenance  Due Date        Last Done       Comments

 

   



                           DIABETES: RETINAL EYE     1962  



                                         EXAM   

 

   



                           DIABETIC FOOT EXAM        1962  

 

   



                           COLONOSCOPY SCREENING     2002  

 

   



                     SHINGLES VACCINES (#2)  2016 

 

   



                     INFLUENZA VACCINE   2020          10/08/2019, 



                                         10/10/2018 

 

   



                     65+ PNEUMOCOCCAL VACCINE  Completed           10/31/2018, 



                                         2016, 



                                         2010, 



                                         Additional 



                                         history 



                                         exists 







Implants





                    Device Identifier   Shelf Expiration Date Model / Serial / L

ot



                 Implanted       Type            Area            Manufactur   



                                         er   

 

                                        2023          [id number] /

 /

                                        [id number]



                 Clip Ligtng [employer]  Surgical        N/A: N/A  

      [facility] [id number]  Implants;           CLOSURE   



                     Implanted: Qty: 2 on 2018 by  Expand[employer];          Chandler Brown MD at [facility]                  Surgical     



                                         Wires     

 

                                        2023          [id number] /

 /

                                        [id number]



                 Clip Ligtng [employer] Aplr  Surgical        N/A: N/A  

      LORENA   



                     [facility] - [id number]  Implants;           CLOSURE   



                     Implanted: Qty: 1 on 2018 by  Expanders;          Chandler Brown MD at [facility]                  Surgical     



                                         Wires     

 

                                        2023          [id number] /

 /

                                        [id number]



                 Clip Ligtng [employer] Aplr  Surgical        N/A: N/A  

      LORENA   



                     [facility] - [id number]  Implants;           CLOSURE   



                     Implanted: Qty: 1 on 2018 by  Expanders;          Chandler Brown, MD at [facility]  Extenders;     



                           [facility]                  Surgical     



                                         Wires     

 

                                        2023          [id number] /

 /

                                        [id number]



                 Clip Ligtng [employer]-Jon[employer] End[employer] Aplr  Surgical        N/A: N/A  

      [facility] - [id number]  Implants;           CLOSURE   



                     Implanted: Qty: 2 on 2018 by  Expanders;          Chandler Brown MD at [facility]  Extenders;     



                           [facility]                  Surgical     



                                         Wires     

 

                                        2023          [id number] /

 /

                                        [id number]



                 Clip Ligtng [employer]-Jon[employer] Aplr  Surgical        N/A: N/A  

      [facility] - [id number]  Implants;           CLOSURE   



                     Implanted: Qty: 1 on 2018 by  Expanders;          Chandler Brown MD at [facility]  Extenders;     



                           [facility]                  Surgical     



                                         Wires     







Results

Not on fileafter 11/10/2019



Insurance





                                        Type



            Payer      Benefit    Subscriber ID  Effective  Phone      Address 



                           Plan /                    Dates   



                                         Group     

 

                                        PPO



                 BCBS            BCBS            [id number]    2017-P   



                           CHOICE                    resent   



                                         PPO/NICOLASA SALAZAR PPO     







     



            Guarantor Name  Account    Relation to  Date of    Phone      Billin

g Address



                     Type                Patient             Birth  

 

     



            Leland Lacy  Personal/F  Self       1950  [phone]  [address]

[address]               ([place])              [place] [address]







Advance Directives





For more information, please contact: [phone]







                          Patient Representative    Explanation



                           Type                      Date Recorded  

 

                                                     



                           Advance Directives,       2018 11:59 AM  



                                         Living Will and   



                                         Medical Power of

## 2020-11-10 NOTE — XMS REPORT
Clinical Summary

                             Created on: 11/10/2020



Leland Lacy

External Reference #: [id number]

: 1950

Sex: Male



Demographics





                          Address                   [address] [place] [address]  [address]

 

                          Home Phone                [phone]

 

                          Preferred Language        Unknown

 

                          Marital Status            Unknown

 

                          Advent Affiliation     Religion

 

                          Race                      White

 

                          Ethnic Group              /Latin





Author





                          Author                    [facility]

 

                          Address                   Unknown

 

                          Phone                     Unavailable







Support





                Name            Relationship    Address         Phone

 

                Marilee Smith   ECON            Unknown         [phone]







Care Team Providers





                    Care Team Member Name Role                Phone

 

                          PCP                       Unavailable







Allergies

No Known Allergies



Medications





                          End Date                  Status



              Medication   Sig          Dispensed    Refills      Start  



                                         Date  

 

                                                    Active



                     amLODIPine (NORVASC) 10  Take 10 mg by       0   



                           MG tablet                 mouth daily.     

 

                                                    Active



                     lansoprazole (PREVACID)  Take 30 mg by       0   



                           30 MG capsule             mouth daily.     

 

                                                    Active



                     metFORMIN (GLUCOPHAGE)  Take 1,000 mg       0   



                           1000 MG tablet            by mouth 2     



                                         (two) times     



                                         daily with     



                                         breakfast and     



                                         dinner.     

 

                                                    Active



                     carvedilol (COREG) 12.5  Take 12.5 mg        0   



                           MG tablet                 by mouth 2     



                                         (two) times     



                                         daily with     



                                         breakfast and     



                                         dinner.     

 

                                                    Active



                     empagliflozin (JARDIANCE)  Take 25 mg by       0   



                           25 mg tablet              mouth daily.     

 

                                                    Active



                     glimepiride (AMARYL) 2 MG  Take 2 mg by        0   



                           tablet                    mouth 2 (two)     



                                         times daily     



                                         with     



                                         breakfast and     



                                         lunch.     

 

                                                    Active



                     SITagliptin (JANUVIA) 100  Take 100 mg         0   



                           MG tablet                 by mouth     



                                         daily.     

 

                                                    Active



                     atorvastatin (LIPITOR) 10  Take 10 mg by       0   



                           MG tablet                 mouth     



                                         nightly.     

 

                                                    Active



                     lisinopril          Take 40 mg by       0   



                           (PRINIVIL,ZESTRIL) 40 MG  mouth daily.     



                                         tablet      

 

                                                    Active



                     aspirin 81 MG EC tablet  Take 81 mg by       0   



                                         mouth daily.     

 

                          2020                



              gabapentin (NEURONTIN)  Take 1       90 capsule   0              



                     300 MG capsule      capsule (300        9  



                                         mg total) by     



                                         mouth 3     



                                         (three) times     



                                         daily.     

 

                          2020                



              oxybutynin (DITROPAN) 5  Take 1 tablet  90 tablet    0            

  



                     MG tablet           (5 mg total)        9  



                                         by mouth 3     



                                         (three) times     



                                         daily.     







Active Problems





 



                           Problem                   Noted Date

 

 



                           Severe protein-calorie malnutrition  10/01/2019

 

 



                           DVT (deep venous thrombosis)  2019

 

 



                           Psoas muscle abscess      2019

 

 



                           Type 2 diabetes mellitus  2019

 

 



                           Prostate cancer           2019







Family History





   



                 Medical History  Relation        Name            Comments

 

   



                           Diabetes                  Mother  







   



                 Relation        Name            Status          Comments

 

   



                           Mother                     



                                         (Age 57) 







Social History





                                        Date



                 Tobacco Use     Types           Packs/Day       Years Used 

 

                                         



                                         Never Smoker    

 

    



                                         Smokeless Tobacco: Never   



                                         Used   







                    Drinks/Week         oz/Week             Comments



                                         Alcohol Use   

 

                                                             



                                         No   







  



                     Alcohol Habits      Answer              Date Recorded

 

  



                     How often do you have a drink containing alcohol?  Never   

            2019

 

  



                           How many drinks containing alcohol do you have on  No

t asked 



                                         a typical day when you are drinking?  

 

  



                           How often do you have six or more drinks on one  Not 

asked 



                                         occasion?  







 



                           Sex Assigned at Birth     Date Recorded

 

 



                                         Not on file 







Last Filed Vital Signs

Not on file



Plan of Treatment





   



                 Health Maintenance  Due Date        Last Done       Comments

 

   



                           DIABETIC EYE EXAM         1962  

 

   



                           DIABETIC FOOT EXAM        1962  

 

   



                           URINE MICROALBUMIN        1962  

 

   



                           MEDICARE ANNUAL WELLNESS  2017  



                                         (YEAR 2 or FIRST YEAR if   



                                         no IPPE)   

 

   



                           HEMOGLOBIN A1C            2019  

 

   



                     INFLUENZA VACCINE (#1)  2020          10/10/2018, 



                                         2016, 



                                         2014, 



                                         Additional 



                                         history 



                                         exists 

 

   



                     COLON CANCER SCREENING  2028 



                                         COLONOSCOPY   

 

   



                     PNEUMOCOCCAL 65+ YRS  Completed           10/31/2018, 



                                         2016, 



                                         2010 







Results

Not on fileafter 11/10/2019



Insurance





                                        Type



            Payer      Benefit    Subscriber ID  Effective  Phone      Address 



                           Plan /                    Dates   



                                         Group     

 

                                         



                 [facility]      [id number]     2016-P   



                           MEDICARE                  resent   



                                         ADV     







     



            Guarantor Name  Account    Relation to  Date of    Phone      Billin

g Address



                     Type                Patient             Birth  

 

     



            Leland Lacy  Personal/F  Self       1950  [phone]  [address] [facility]               ([place])              [place] [address]

[address]







Advance Directives





For more information, please contact: [phone]







                          Date Inactivated          Comments



                           Code Status               Date Activated  

 

                          10/3/2019  6:58 PM         



                           Full Code                 2019  7:16 PM  







  



                           This code status was determined by:  Patient 







                                                     

 

                          2019  6:04 PM          



                           Full Code                 2019  2:02 AM  







  



                           This code status was determined by:  Patient

## 2020-11-10 NOTE — HISTORY AND PHYSICAL
PRIMARY CARE PHYSICIAN:  Dr. Wilder Almonte at [facility].

 

CHIEF COMPLAINT:  Generalized abdominal pain radiating to the back.

 

HISTORY OF PRESENT ILLNESS:  This is a 70-year-old male with past medical history of

hypertension, high cholesterol, diabetes type 2, GERD and prostate cancer, presented to

the ER with complaints of abdominal pain that started about seven days ago,

progressively getting worse.  He denies any fever, chills, hematuria, dysuria, nausea,

or vomiting.  Denies any chest pain, shortness of breath, diaphoresis, melena.  He

describes the pain as diffused abdominal pain and with poor appetite for the past few

days.  So presented to the ER for further evaluation.  In ER, creatinine was 1.31, BUN

40, lipase 121.  Ultrasound of the abdomen showed hepatomegaly and cholelithiasis

without evidence of acute cholecystitis.  CT abdomen and pelvis was suggestive of

urinary bladder cystitis and bilateral renal cysts, largest is 2.5 cm cyst in the left

renal area. 

 

PAST MEDICAL HISTORY:  

1. Hypertension.

2. High cholesterol.

3. Diabetes type 2.

4. GERD.

5. Prostate cancer.

 

PAST SURGICAL HISTORY:  He had prostatectomy.

 

FAMILY MEDICAL HISTORY:  He reports mother has diabetes and unknown of fathers medical

history. 

 

SOCIAL HISTORY:  He denies any tobacco, alcohol or illicit drug use.  Lives with family

at home. 

 

ALLERGIES:  NO KNOWN DRUG ALLERGIES.

 

REVIEW OF SYSTEMS:

Twelve systems reviewed and negative except as reported in HPI.

 

PHYSICAL EXAMINATION:

VITAL SIGNS:  Temperature 98.8, pulse is 77, respirations 18, blood pressure 129/70,

pulse ox is 99% on room air. 

GENERAL:  No acute distress. 

HEENT:  Normocephalic, atraumatic. 

NECK:  Supple. 

CARDIOVASCULAR:  Regular rate and rhythm. 

LUNGS:  Clear to auscultation. 

ABDOMEN:  Soft and mild tender in the right upper quadrant and lower quadrant. 

NEURO:  Alert, awake, and oriented x3. 

MUSCULOSKELETAL:  Moves all extremities.  No edema.

LABORATORY DATA:  WBC 6.96, hemoglobin 14.7, hematocrit 45.4, platelets 253.  Sodium

137, potassium 3.7, CO2 of 28, BUN is 40, creatinine 1.31, estimated GFR 54, lipase 121,

amylase 127.  UA negative for leukocyte esterase, few WBCs and bacteria.  Coronavirus

PCR is pending. 

 

IMAGING DATA:  Gallbladder ultrasound shows cholelithiasis without evidence of acute

cholecystitis.  CT abdomen suggestive of urinary bladder cystitis and bilateral renal

cysts, largest is at 2.5 cm cyst in the left renal inferior pole. 

 

IMPRESSION AND PLAN:  

1. Abdominal pain with gallstone pancreatitis.  Lipase is elevated at 121, imaging

noted.  We will continue with IV fluids and pain management.  Start clear liquids.  MRCP

is pending.  Dr. Barr of surgical team consulted.  Plan for lap cholecystectomy tomorrow. 

2. Acute kidney injury.  Creatinine is 1.31 likely due to __________ and abdominal pain.

 We will continue with IV fluids __________. 

3. Hypertension.  Stable.  We will treat with hydralazine as needed.

4. Diabetes type 2.  Follow up on sliding scale insulin.

5. High cholesterol.  We will resume medications tomorrow.

6. Prostate cancer.  CT abdomen is suggestive of urinary bladder cystitis.  UA is

negative.  We will continue with Zosyn.  Denies any dysuria or hematuria. 

7. Gastrointestinal and deep venous thrombosis prophylaxis.  No anticoagulation due to

possible surgery tomorrow. 

 

 

Dictated by JUSTO Chamorro

 

______________________________

Atul Shelton MD

 

MY/MODL

D:  11/10/2020 11:51:16

T:  11/10/2020 14:28:04

Job #:  [id number]

## 2020-11-10 NOTE — EMERGENCY DEPARTMENT NOTE
History of Present Illnes


History of Present Illness


Chief Complaint:  Abdominal Complaints


History of Present Illness


This is a 70 year old  male  male complaining of pain to suprapubic 

region that radiates to l abdomen


   and l flank x 4 days. pt also reports burning c urination. denies n/v/d.


Historian:  Patient


Arrival Mode:  Car


Onset (how long ago):  day(s) (4)


Location:  LEFT ABD


Quality:  PAIN


Radiation:  Reports non-radiation


Severity:  moderate


Onset quality:  gradual


Duration (how long):  day(s) (4)


Timing of current episode:  constant


Progression:  worsening


Chronicity:  new


Context:  Denies recent illness, Denies recent surgery


Relieving factors:  none


Exacerbating factors:  none


Associated symptoms:  Reports other (PAIN WITH URINATION)


Treatments prior to arrival:  none





Past Medical/Family History


Physician Review


I have reviewed the patient's past medical and family history.  Any updates have

been documented here.





Past Medical History


Recent Fever:  No


Clinical Suspicion of Infectio:  No


New/Unexplained Change in Ment:  No


Past Medical History:  Hypertension, Diabetes, UTI's, GERD, Hyperlipedemia


Other Medical History:  


BPH





PROSTATE CA


Other Surgery:  


PROSTECTOMY





Social History


Smoking Cessation:  Never Smoker


Counseling Performed:  No


Alcohol Use:  None


Any Illegal Drug Use:  No


Physically hurt or threatened:  No





Family History


Family history of heart diseas:  No





Other


Last Tetanus:  UTD


Any Pre-Existing Lines (PICC,:  No





Review of Systems


Review of Systems


Constitutional:  Reports no symptoms


EENTM:  Reports no symptoms


Cardiovascular:  Reports no symptoms


Respiratory:  Reports no symptoms


Gastrointestinal:  Reports as per HPI


Genitourinary:  Reports no symptoms


Musculoskeletal:  Reports no symptoms


Integumentary:  Reports no symptoms


Neurological:  Reports no symptoms


Psychological:  Reports no symptoms


Endocrine:  Reports no symptoms


Hematological/Lymphatic:  Reports no symptoms





Physical Exam


Related Data


Allergies:  


Coded Allergies:  


     No Known Allergies (Unverified , 11/2/19)


Triage Vital Signs





Vital Signs








  Date Time  Temp Pulse Resp B/P (MAP) Pulse Ox O2 Delivery O2 Flow Rate FiO2


 


11/9/20 22:54 97.8 83 17 115/79 98 Room Air  








Vital signs reviewed:  Yes





Physical Exam


CONSTITUTIONAL





Constitutional:  Present well-developed, Present well-nourished; 


   Absent distressed


HENT


HENT:  Present normocephalic, Present atraumatic, Present oropharynx 

clear/moist, Present nose normal


HENT L/R:  Present left ext ear normal, Present right ext ear normal


EYES





Eyes:  Reports PERRL, Reports conjunctivae normal


NECK


Neck:  Present ROM normal


PULMONARY


Pulmonary:  Present effort normal, Present breath sounds normal


CARDIOVASCULAR





Cardiovascular:  Present regular rhythm, Present heart sounds normal, Present 

capillary refill normal, Present normal rate


GASTROINTESTINAL





Abdominal:  Present soft, Present bowel sounds normal, Present tender 

(EPIGASTRIC, AND ENTIRE LEFT ABD, ), Present left CVA tenderness (MILD)


GENITOURINARY





Genitourinary:  Present exam deferred


SKIN


Skin:  Present warm, Present dry


MUSCULOSKELETAL





Musculoskeletal:  Present ROM normal


NEUROLOGICAL





Neurological:  Present alert, Present oriented x 3, Present no gross motor or 

sensory deficits


PSYCHOLOGICAL


Psychological:  Present mood/affect normal, Present judgement normal





Results


Laboratory


Result Diagram:  


11/9/20 2242 11/9/20 2242





Laboratory





Laboratory Tests








Test


 11/9/20


22:42


 


White Blood Count


 6.96 x10e3/uL


(4.8-10.8)


 


Red Blood Count


 5.22 x10e6/uL


(4.3-5.7)


 


Hemoglobin


 14.7 g/dL


(14.0-18.0)


 


Hematocrit


 45.4 %


(38.2-49.6)


 


Mean Corpuscular Volume


 87.0 fL


(81-99)


 


Mean Corpuscular Hemoglobin


 28.2 pg


(28-32)


 


Mean Corpuscular Hemoglobin


Concent 32.4 g/dL


(31-35)


 


Red Cell Distribution Width


 15.4 %


(11.7-14.4)


 


Platelet Count


 253 x10e3/uL


(140-360)


 


Neutrophils (%) (Auto)


 72.8 %


(38.7-80.0)


 


Lymphocytes (%) (Auto)


 11.9 %


(18.0-39.1)


 


Monocytes (%) (Auto)


 8.0  %


(4.4-11.3)


 


Eosinophils (%) (Auto)


 6.3 %


(0.0-6.0)


 


Basophils (%) (Auto)


 0.1 %


(0.0-1.0)


 


Neutrophils # (Auto) 5.1 (2.1-6.9) 


 


Lymphocytes # (Auto) 0.8 (1.0-3.2) 


 


Monocytes # (Auto) 0.6 (0.2-0.8) 


 


Eosinophils # (Auto) 0.4 (0.0-0.4) 


 


Basophils # (Auto) 0.0 (0.0-0.1) 


 


Absolute Immature Granulocyte


(auto 0.06 x10e3/uL


(0-0.1)


 


Urine Color


 Yellow


(YELLOW)


 


Urine Clarity Clear (CLEAR) 


 


Urine pH 5 (5 - 7) 


 


Urine Specific Gravity


 >=1.030


(1.010-1.025)


 


Urine Protein


 >=300


(NEGATIVE)


 


Urine Glucose (UA)


 Negative


(NEGATIVE)


 


Urine Ketones


 Negative


(NEGATIVE)


 


Urine Blood


 Negative


(NEGATIVE)


 


Urine Nitrite


 Negative


(NEGATIVE)


 


Urine Bilirubin


 Negative


(NEGATIVE)


 


Urine Urobilinogen


 0.2 mg/dL (0.2


- 1)


 


Urine Leukocyte Esterase


 Negative


(NEGATIVE)


 


Urine RBC 0-5 /HPF (0-5) 


 


Urine WBC 0-5 /HPF (0-5) 


 


Urine Epithelial Cells


 Few /LPF


(NONE)


 


Urine Amorphous Sediment Few (FEW) 


 


Urine Bacteria


 Few /HPF


(NONE)


 


Sodium Level


 137 mmol/L


(136-145)


 


Potassium Level


 3.7 mmol/L


(3.5-5.1)


 


Chloride Level


 96 mmol/L


()


 


Carbon Dioxide Level


 28 mmol/L


(22-29)


 


Anion Gap


 16.7 mmol/L


(8-16)


 


Blood Urea Nitrogen


 40 mg/dL


(7-26)


 


Creatinine


 1.31 mg/dL


(0.72-1.25)


 


Estimat Glomerular Filtration


Rate 54 ML/MIN


(60-)


 


BUN/Creatinine Ratio 31 (6-25) 


 


Glucose Level


 194 mg/dL


()


 


Calcium Level


 9.1 mg/dL


(8.4-10.2)


 


Total Bilirubin


 0.4 mg/dL


(0.2-1.2)


 


Aspartate Amino Transf


(AST/SGOT) 16 IU/L (5-34) 





 


Alanine Aminotransferase


(ALT/SGPT) 22 IU/L (0-55) 





 


Alkaline Phosphatase


 59 IU/L


()


 


Total Protein


 7.1 g/dL


(6.5-8.1)


 


Albumin


 3.8 g/dL


(3.5-5.0)


 


Globulin


 3.3 g/dL


(2.3-3.5)


 


Albumin/Globulin Ratio 1.2 (0.8-2.0) 


 


Amylase Level


 127 U/L


()


 


Lipase 121 U/L (8-78) 








Lab results reviewed:  Yes





Imaging


Imaging results reviewed:  Yes


Impressions


Procedure: [id number] US/US GALLBLADDER


Exam Date: 11/10/20                            Exam Time: 0234








                              REPORT STATUS: Signed





EXAM: Right Upper Quadrant Ultrasound 


INDICATION:        


^UPPER ABD PAIN WITH PANCREATITIS


^[id number]


^0234


^Y


COMPARISON: Abdominal CT 11/9/2020. 


TECHNIQUE: Transverse and longitudinal images of the right upper abdomen were


obtained.  





FINDINGS:     


Liver:


     Size: 19 cm in the right midclavicular line, enlarged


     Appearance: Increased and heterogeneous echogenicity, smooth contour


     Mass: No focal masses





Gallbladder:


     Stones/Sludge: Small gallstones


     Wall: 0.27 cm


     Appearance: No pericholecystic fluid or hydrops.  


     Sonographic Winter's Sign: Negative





Bile Ducts:


     Intrahepatic Ducts: No dilatation


     Extrahepatic Ducts: Common bile duct measures ... cm, no dilatation





Pancreas:


     Not well seen.





Right Kidney:


     Size: 10.8 cm


     Echogenicity: Normal   


     Parenchymal thickness: Normal 


     Collecting system: No hydronephrosis


     Stones: None


     Cyst/Mass: None





Vessels:


     Aorta: Visualized portions are normal


     Inferior Vena Cava: Visualized portions are normal


     Main portal vein: Normal size and flow direction.





Free Fluid:


     No ascites or pleural effusion








IMPRESSION:





Hepatomegaly with hepatic steatosis.





Cholelithiasis without evidence of acute cholecystitis.





Signed by: Cy Bella DO on 11/10/2020 3:46 AM








Dictated By: CY BELLA DO


Electronically Signed By: CY BELLA DO on 11/10/20 0346


Transcribed By: CATARINO on 11/10/20 0346 








COPY TO:   CHYNA KEVIN MD~











Procedure: [id number] CT/CT ABDOMEN/PELVIS W


Exam Date: 11/09/20                            Exam Time: 2350








                              REPORT STATUS: Signed





EXAM: CT Abdomen and Pelvis WITH contrast  


INDICATION:      


^left abd pain


^[id number]


^2350


^Y 


COMPARISON: Abdominal CT 8/10/2019.


TECHNIQUE: Abdomen and pelvis were scanned utilizing a multidetector helical


scanner from the lung base to the pubic symphysis after administration of IV


contrast. Coronal and sagittal reformations were obtained. Routine protocol was


performed. Scan was performed when during portal venous phase.    


     IV CONTRAST: 100 mL of Isovue 370


     ORAL CONTRAST: None


            


COMPLICATIONS: None





RADIATION DOSE:


     Total DLP: 748 mGy*cm


     Estimated effective dose: (DLP x 0.015 x size factor) mSv


     CTDIvol has been reviewed. It is below the limits set by the Radiation


Protocol Committee (RPC).


     Dose modulation, iterative reconstruction, and/or weight based adjustment


of the mA/kV was utilized to reduce the radiation dose to as low as reasonably


achievable. 





FINDINGS:





LINES and TUBES: None.





LOWER THORAX:  Nonsuspicious nodules in the lower lobes are less than 5 mm,


likely benign. No follow-up required. Aortic valve calcifications. Left


coronary calcifications. 





HEPATOBILIARY:      No focal hepatic lesions. No biliary ductal dilation. 





GALLBLADDER: No radio-opaque stones or sludge.  No wall thickening.





SPLEEN: No splenomegaly. Thin linear calcification along the posterior superior


lateral aspect of the spleen, suspect from prior trauma or inflammation.





PANCREAS: No focal masses or ductal dilatation.  





ADRENALS: No adrenal nodules    





KIDNEYS/URETERS: Kidneys enhance symmetrically.  No hydronephrosis. Simple


cysts in both kidneys, including a 2.5 cm cyst in the left renal inferior pole.


No solid mass lesions.  No stones.





GI TRACT: No abnormal distention, wall thickening, or evidence of bowel


obstruction.       Appendix is normal.





PELVIC ORGANS/BLADDER: Urinary bladder is underdistended with mild


circumferential urinary bladder wall thickening. Soft tissue contiguity between


the rectum and lower bladder. The prostate has been removed.





LYMPH NODES: No lymphadenopathy.





VESSELS:     Arterial calcifications.





PERITONEUM / RETROPERITONEUM: No free air or fluid.





BONES: Unremarkable.





SOFT TISSUES: Unremarkable.            





IMPRESSION: 





Subtle findings suggestive of urinary bladder cystitis. Correlate with


urinalysis. Soft tissue contiguity between the rectum and lower bladder, can be


due to surgery, although if there is recurrent episodes of cystitis, consider


rectovesicular fistula.


Bilateral renal cysts, largest is a 2.5 cm cyst in the left renal inferior


pole.





Signed by: Cy Belal DO on 11/10/2020 1:29 AM








Dictated By: CY BELLA DO


Electronically Signed By: CY BELLA DO on 11/10/20 0129


Transcribed By: CATARINO on 11/10/20 0129 








COPY TO:   CHYNA KEVIN MD~





Assessment & Plan


Medical Decision Making


MDM


PT WITH EPIGASTRIC , LEFT ABD AND LEFT FLANK PAIN





CBC, CMP, UA, CT ABD/PELVIS, AMYLASE,LIPASE ORDERED TO EVAL FOR PANCREATITIS, 

UTI, COLITIS, DIVERTICULITIS, KIDNEY STONE, ELECTROLYTE ABNORMALITY





i spoke with dr espino, dr kathie palacios, and dr dyer





Assessment & Plan


Final Impression:  


(1) Gallstone pancreatitis


Depart Disposition:  ADMITTED


Last Vital Signs











  Date Time  Temp Pulse Resp B/P (MAP) Pulse Ox O2 Delivery O2 Flow Rate FiO2


 


11/9/20 23:40  82 17 120/77 97 Room Air  


 


11/9/20 22:54 97.8       








Home Meds


Active Scripts


Clindamycin Hcl (CLINDAMYCIN HCL) 150 Mg Capsule, 300 CAP PO Q6H for RUE 

CELLULITIS for 8 Days, #32


   Prov:RANDA POWELL NP         11/4/19


Reported Medications


Hydrochlorothiazide (HYDROCHLOROTHIAZIDE) 12.5 Mg Capsule, 12.5 MG PO DAILY


   11/2/19


Potassium Chloride (POTASSIUM CHLORIDE) 20 Meq Tab.er.prt, 20 MEQ PO DAILY


   11/2/19


Valsartan/Hydrochlorothiazide (DIOVAN -25 MG TABLET) 1 Each Tablet, 1 TAB

PO DAILY


   11/2/19


Carvedilol (COREG) 12.5 Mg Tab, 12.5 MG PO Q8HR


   11/2/19


Hydrocodone Bit/Acetaminophen (HYDROCODON-ACETAMINOPH 7.5-325) 1 Each Tablet, 1 

TAB PO Q8H PRN for MODERATE PAIN (4-6)


   10/23/19


Gabapentin (GABAPENTIN) 300 Mg Capsule, 300 MG PO TID, #60 CAP


   10/23/19


Aspirin (ASPIRIN) 81 Mg Tab.chew, 81 MG PO DAILY


   10/23/19


[Jardiance]   No Conflict Check, 25 MG PO DAILY


   8/10/19


Atorvastatin Calcium (ATORVASTATIN CALCIUM) 10 Mg Tablet, 10 MG PO HS


   8/10/19


Glimepiride (GLIMEPIRIDE) 2 Mg Tablet, 2 MG PO Q12H


   8/10/19


Lansoprazole (LANSOPRAZOLE) 30 Mg Capsule.dr, 30 MG PO DAILY


   8/10/19


Metformin Hcl (METFORMIN HCL) 1,000 Mg Tablet, 1000 MG PO BID


   8/10/19


Amlodipine Besylate (AMLODIPINE BESYLATE) 10 Mg Tablet, 10 MG PO DAILY


   8/10/19


Sitagliptin Phosphate (JANUVIA) 100 Mg Tablet, 100 MG PO DAILY


   8/10/19


Medications in the ED





Sodium Chloride 1,000 ml @  999 mls/hr Q1H1M ONCE IV  Last administered on 

11/10/20at 00:26; Admin Dose 999 MLS/HR;  Start 11/10/20 at 00:30;  Stop 

11/10/20 at 01:30;  Status UNV


Iopamidol 74,000 mg STK-MED ONCE INJ ;  Start 11/10/20 at 00:25;  Stop 11/10/20 

at 00:17;  Status DC


Sodium Chloride 50 ml @ ud STK-MED ONCE .ROUTE ;  Start 11/10/20 at 00:25;  Stop

 11/10/20 at 00:17;  Status DC











CHYNA KEVIN MD        Nov 10, 2020 00:39

## 2020-11-10 NOTE — DIAGNOSTIC IMAGING REPORT
EXAM: CT Abdomen and Pelvis WITH contrast  

INDICATION:      

^left abd pain

^[id number]

^2350

^Y 

COMPARISON: Abdominal CT 8/10/2019.

TECHNIQUE: Abdomen and pelvis were scanned utilizing a multidetector helical

scanner from the lung base to the pubic symphysis after administration of IV

contrast. Coronal and sagittal reformations were obtained. Routine protocol was

performed. Scan was performed when during portal venous phase.    

     IV CONTRAST: 100 mL of Isovue 370

     ORAL CONTRAST: None

            

COMPLICATIONS: None



RADIATION DOSE:

     Total DLP: 748 mGy*cm

     Estimated effective dose: (DLP x 0.015 x size factor) mSv

     CTDIvol has been reviewed. It is below the limits set by the Radiation

Protocol Committee (RPC).

     Dose modulation, iterative reconstruction, and/or weight based adjustment

of the mA/kV was utilized to reduce the radiation dose to as low as reasonably

achievable. 



FINDINGS:



LINES and TUBES: None.



LOWER THORAX:  Nonsuspicious nodules in the lower lobes are less than 5 mm,

likely benign. No follow-up required. Aortic valve calcifications. Left

coronary calcifications. 



HEPATOBILIARY:      No focal hepatic lesions. No biliary ductal dilation. 



GALLBLADDER: No radio-opaque stones or sludge.  No wall thickening.



SPLEEN: No splenomegaly. Thin linear calcification along the posterior superior

lateral aspect of the spleen, suspect from prior trauma or inflammation.



PANCREAS: No focal masses or ductal dilatation.  



ADRENALS: No adrenal nodules    



KIDNEYS/URETERS: Kidneys enhance symmetrically.  No hydronephrosis. Simple

cysts in both kidneys, including a 2.5 cm cyst in the left renal inferior pole.

No solid mass lesions.  No stones.



GI TRACT: No abnormal distention, wall thickening, or evidence of bowel

obstruction.       Appendix is normal.



PELVIC ORGANS/BLADDER: Urinary bladder is underdistended with mild

circumferential urinary bladder wall thickening. Soft tissue contiguity between

the rectum and lower bladder. The prostate has been removed.



LYMPH NODES: No lymphadenopathy.



VESSELS:     Arterial calcifications.



PERITONEUM / RETROPERITONEUM: No free air or fluid.



BONES: Unremarkable.



SOFT TISSUES: Unremarkable.            



IMPRESSION: 



Subtle findings suggestive of urinary bladder cystitis. Correlate with

urinalysis. Soft tissue contiguity between the rectum and lower bladder, can be

due to surgery, although if there is recurrent episodes of cystitis, consider

rectovesicular fistula.

Bilateral renal cysts, largest is a 2.5 cm cyst in the left renal inferior

pole.



Signed by: Cy Bella DO on 11/10/2020 1:29 AM

## 2020-11-10 NOTE — DIAGNOSTIC IMAGING REPORT
EXAM: MRI of the abdomen with and without contrast with MRCP



INDICATION: Gallstone pancreatitis.



COMPARISON:   Ultrasound 11/10/2020. CT 11/9/2020



TECHNIQUE: Multiplanar and multisequence imaging was performed of the abdomen.

T1 and T2-weighted images were obtained with and without contrast. T1-weighted

in and out-of-phase .

M.R.C.P. technique: Multiplanar, multisequence MRCP was performed, with

sequences including coronal turbo spin-echo T1-weighted scans, [facility] MRCP scans,

coronal spin, [facility]PR 2[facility]  [facility] MRCP [place].





Discussion:



LOWER THORAX: Unremarkable.  



HEPATOBILIARY: There is diffuse loss of signal on out of phase images,

consistent with diffuse hepatic steatosis.  No focal hepatic lesions. No

biliary ductal dilation. 



GALLBLADDER: Single gallstone is visualized within the gallbladder.  No wall

thickening.



SPLEEN: No splenomegaly. 



PANCREAS: No focal masses or ductal dilatation.  



ADRENALS: No adrenal nodules    



KIDNEYS/URETERS: Kidneys enhance symmetrically.  No hydronephrosis. Cysts in

the bilateral kidneys measuring up to 3 cm in interpolar region of the left

kidney.  No stones.



GI TRACT: No abnormal distention, wall thickening, or evidence of bowel

obstruction.       



LYMPH NODES: No lymphadenopathy.



VESSELS: Unremarkable.



PERITONEUM / RETROPERITONEUM: No free air or fluid.



BONES: Unremarkable.



SOFT TISSUES: Unremarkable.           



IMPRESSION:

Cholelithiasis. No findings to suggest acute cholecystitis or

choledocholithiasis. No biliary duct dilation.



Unremarkable appearance of the pancreas.



Hepatic steatosis



Signed by: Adrián Valero MD on 11/10/2020 11:38 AM

## 2020-11-10 NOTE — XMS REPORT
Clinical Summary

                             Created on: 11/10/2020



Ciaran Lacyo

External Reference #: [id number]

: 1950

Sex: Male



Demographics





                          Address                   [address] [place]  [address]

 

                          Home Phone                [phone]

 

                          Preferred Language        Unknown

 

                          Marital Status            

 

                          [employer] Affiliation     Unknown

 

                          Race                      White

 

                          Ethnic Group              /Latin





Author





                          Author                    [facility]

 

                          Organization              [facility]

 

                          Address                   Unknown

 

                          Phone                     Unavailable







Support





                Name            Relationship    Address         Phone

 

                Dania Mendez ECON            Unknown         [phone]

7







Care Team Providers





                    Care Team Member Name Role                Phone

 

                    Chapincito Naranjo MD PCP                 [phone]







Allergies

No Known Active Allergies



Medications





                          End Date                  Status



              Medication   Sig          Dispensed    Refills      Start  



                                         Date  

 

                                                    Active



                     metFORMIN (GLUCOPHAGE)  Take 1,000 mg       0   



                           500 mg tablet             by mouth 2     



                                         (two) times a     



                                         day with     



                                         meals.     

 

                                                    Active



                     lisinopril          Take 40 mg by       0   



                           (PRINIVIL,ZESTRIL) 10 mg  mouth 2 (two)     



                           tablet                    times a day.     

 

                                                    Active



                     carvedilol (COREG) 12.5  Take 6.25 mg        0   



                           MG tablet                 by mouth 2     



                                         (two) times a     



                                         day with     



                                         meals. 1/2     



                                         tablet..     



                                         018. Dose is     



                                         3.125mg 2 x a     



                                         day     

 

                                                    Active



                     atorvastatin (LIPITOR) 10  Take 20 mg by       0   



                           MG tablet                 mouth     



                                         nightly.     

 

                                                    Active



                     aspirin (ECOTRIN) 81 MG  Take 81 mg by       0   



                           enteric coated tablet     mouth daily.     

 

                                                    Active



                 amLODIPine (NORVASC) 10  TOME HELEN        0               07/14/

201  



                     mg tablet           TABLETA TODOS       8  



                                         LOS D?AS     

 

                                                    Active



                 JARDIANCE 25 mg tablet  TOME HELEN        0               /2

01  



                           TABLETA TODOS             8  



                                         LOS D?AS  EN     



                                         LA MA?ENDER     

 

                                                    Active



                 GLYXAMBI 25-5 mg tablet  Take 1 tablet   0               07/16/

201  



                           by mouth                  8  



                                         daily.     

 

                                                    Active



                     LANSOPRAZOLE ORAL   Take by             0   



                                         mouth.     







Active Problems





 



                           Problem                   Noted Date

 

 



                           Prostate cancer           2018







Surgical History





   



                 Surgery         Date            Site/Laterality  Comments

 

   



                                         PROSTATE BIOPSY   

 

   



                 PROSTATECTOMY,  2018       N/A             Procedure: ROBO

TIC ASSISTED LAPAROSCOPIC



                           LAPAROSCOPIC,             PROSTATECTOMY, BILATERAL PE

LVIC LYMPH NODE



                           ROBOT-ASSISTED            DISSECTION, URETHROPEXY;  S

urgeon: Chandler Dowling MD;  Location: [facility] MAIN O

R;



                                         Service: Urology;  Laterality: N/A;



                                         Medical devices from this surgery are i

n the



                                         Implants section.







Medical History





  



                     Medical History     Date                Comments

 

  



                                         Diabetes mellitus (HCC)  

 

  



                           Prostate cancer (HCC)     denies chemo or xrt

 

  



                           Anesthesia                Pt...nphap, CFROM / Family.

...nfhap

 

  



                           Hypertension              denies sob, cp, mi, palpita

tion etc

 

  



                           GERD (gastroesophageal reflux disease)   medication m

anaged

 

  



                                         Hypercholesteremia  







Social History





                                        Date



                 Tobacco Use     Types           Packs/Day       Years Used 

 

                                         



                                         Never Smoker    

 

    



                                         Smokeless Tobacco: Never   



                                         Used   







                                        Tobacco Cessation: Counseling Given: No









                    Drinks/Week         oz/Week             Comments



                                         Alcohol Use   

 

                                                             



                                         No   







 



                           Sex Assigned at Birth     Date Recorded

 

 



                                         Not on file 







Last Filed Vital Signs

Not on file



Plan of Treatment





   



                 Health Maintenance  Due Date        Last Done       Comments

 

   



                           DIABETES: RETINAL EYE     1962  



                                         EXAM   

 

   



                           DIABETIC FOOT EXAM        1962  

 

   



                           COLONOSCOPY SCREENING     2002  

 

   



                     SHINGLES VACCINES (#2)  2016 

 

   



                     INFLUENZA VACCINE   2020          10/08/2019, 



                                         10/10/2018 

 

   



                     65+ PNEUMOCOCCAL VACCINE  Completed           10/31/2018, 



                                         2016, 



                                         2010, 



                                         Additional 



                                         history 



                                         exists 







Implants





                    Device Identifier   Shelf Expiration Date Model / Serial / L

ot



                 Implanted       Type            Area            Manufactur   



                                         er   

 

                                        2023          [id number] /

 /

                                        [id number]



                 Clip [employer]igt[employer]  Surgical        N/A: N/A  

      [facility] [id number]  Implants;           CLOSURE   



                     Implanted: Qty: 2 on 2018 by  [employer];          Chandler Brown MD at [facility]                  Surgical     



                                         Wires     

 

                                        2023          [id number] /

 /

                                        [id number]



                 Clip Ligtng [employer]  Surgical        N/A: N/A  

      LORENA   



                     [facility] - [id number]  Implants;           CLOSURE   



                     Implanted: Qty: 1 on 2018 by  [employer];          Chandler Brown MD at [facility]                  Surgical     



                                         Wires     

 

                                        2023          [id number] /

 /

                                        [id number]



                 Clip Ligtng [employer]  Surgical        N/A: N/A  

      LORENA   



                     [facility] - [id number]  Implants;           CLOSURE   



                     Implanted: Qty: 1 on 2018 by  [employer];          Chandler Brown, MD at [facility]  Extenders;     



                           hospitals                  Surgical     



                                         Wires     

 

                                        2023          [id number] /

 /

                                        [id number]



                 Clip Ligtng [employer]  Surgical        N/A: N/A  

      [facility] [id number]  Implants;           CLOSURE   



                     Implanted: Qty: 2 on 2018 by  [employer];          Chandler Brown MD at [facility]  Ext[employer];     



                           hospitals                  Surgical     



                                         Wires     

 

                                        2023          [id number] /

 /

                                        [id number]



                 Clip Ligtng [employer]  Surgical        N/A: N/A  

      [facility] - [id number]  Implants;           CLOSURE   



                     Implanted: Qty: 1 on 2018 by  [employer];          Chandler Brown MD at [facility]  Extenders;     



                           hospitals                  Surgical     



                                         Wires     







Results

Not on fileafter 11/10/2019



Insurance





                                        Type



            Payer      Benefit    Subscriber ID  Effective  Phone      Address 



                           Plan /                    Dates   



                                         Group     

 

                                        PPO



                 BCBS            BCBS            [id number]    2017-P   



                           CHOICE                    resent   



                                         PPO/NICOLASA SALAZAR PPO     







     



            Guarantor Name  Account    Relation to  Date of    Phone      Billin

g Address



                     Type                Patient             Birth  

 

     



            Leland Lacy  Personal/F  Self       1950  [phone]  [address]

[address]               ([place])              [place] [address]







Advance Directives





For more information, please contact: [phone]







                          Patient Representative    Explanation



                           Type                      Date Recorded  

 

                                                     



                           Advance Directives,       2018 11:59 AM  



                                         Living Will and   



                                         Medical Power of

## 2020-11-10 NOTE — CONSULTATION
DATE OF CONSULTATION:  11/10/2020  

 

CHIEF COMPLAINT:  Abdominal pain.

 

HISTORY OF PRESENT ILLNESS:  The patient is a 70-year-old male with a 3-day history of

upper abdominal pain radiating to the back with some nausea.  No vomiting.  No fever,

chills, or diarrhea.  The patient states since visit to the ER, pain has lessened

somewhat with pain medications. 

 

PAST MEDICAL HISTORY:  Metabolic syndrome with hypertension, diabetes, hyperlipidemia,

prostate cancer, GERD, reflux. 

 

PAST SURGICAL HISTORY:  Positive for prostatectomy.

 

ALLERGIES:  HE HAS NO DRUG ALLERGIES.

 

SOCIAL HISTORY:  He does not smoke or drink alcohol.

 

REVIEW OF SYSTEMS:

No chest pain, shortness of breath, cough, or fevers.

 

PHYSICAL EXAMINATION:

VITAL SIGNS:  Stable, afebrile. 

GENERAL:  He is awake, alert, in moderate discomfort. 

HEENT:  Sclera anicteric. 

NECK:  Supple. 

LUNGS:  Clear. 

HEART:  Regular rate and rhythm. 

ABDOMEN:  Soft with some guarding in the epigastrium with no rebound tenderness. 

EXTREMITIES:  No cyanosis or edema.

LABORATORY DATA:  White cell count is 7, hemoglobin of 14, platelet count of 253,

creatinine of 1.3.  Liver function enzymes within normal limits with lipase of 121 and

amylase of 127.  Ultrasound of the gallbladder showed gallstone and sludge with no wall

thickening.  CT of the abdomen showed findings suggestive of urinary bladder cystitis. 

 

ASSESSMENT:  Abdominal pain in the patient with gallstone and sludge with mildly

elevated lipase and amylase. 

 

PLAN:  MRCP pending.  Consideration for laparoscopic cholecystectomy.

 

 

 

 

______________________________

MD SHEILA Nunez/BAY

D:  11/10/2020 09:51:40

T:  11/10/2020 10:32:35

Job #:  [id number]

## 2020-11-10 NOTE — DIAGNOSTIC IMAGING REPORT
EXAM: Right Upper Quadrant Ultrasound 

INDICATION:        

^UPPER ABD PAIN WITH PANCREATITIS

^20201110

^0234

^Y

COMPARISON: Abdominal CT 11/9/2020. 

TECHNIQUE: Transverse and longitudinal images of the right upper abdomen were

obtained.  



FINDINGS:     

Liver:

     Size: 19 cm in the right midclavicular line, enlarged

     Appearance: Increased and heterogeneous echogenicity, smooth contour

     Mass: No focal masses



Gallbladder:

     Stones/Sludge: Small gallstones

     Wall: 0.27 cm

     Appearance: No pericholecystic fluid or hydrops.  

     Sonographic Winter's Sign: Negative



Bile Ducts:

     Intrahepatic Ducts: No dilatation

     Extrahepatic Ducts: Common bile duct measures ... cm, no dilatation



Pancreas:

     Not well seen.



Right Kidney:

     Size: 10.8 cm

     Echogenicity: Normal   

     Parenchymal thickness: Normal 

     Collecting system: No hydronephrosis

     Stones: None

     Cyst/Mass: None



Vessels:

     Aorta: Visualized portions are normal

     Inferior Vena Cava: Visualized portions are normal

     Main portal vein: Normal size and flow direction.



Free Fluid:

     No ascites or pleural effusion





IMPRESSION:



Hepatomegaly with hepatic steatosis.



Cholelithiasis without evidence of acute cholecystitis.



Signed by: Cy Bella DO on 11/10/2020 3:46 AM

## 2020-11-10 NOTE — XMS REPORT
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  

No acute intracranial abnormality or calvarial fracture.

                          10/24/2019                         [facility]                    



                                                                                
                   



Consultation Notes

                    



                                        No Data Provided for This Section       

             



                                                            



Discharge Summaries

                    



                                        No Data Provided for This Section       

             



                                                            



History and Physicals

                    



                                        No Data Provided for This Section       

             



                                                                



Vital Signs

                     



                    Vital Sign                         Value                    

     Date           

                          Comments                         Source               

     

 

                    Temperature Oral (F)                         98 F           

              

10/25/2019                                                   [facility]                    

 

                    Heart Rate                         77                       

   10/25/2019       

                                                    [facility]     

          

    

 

                    Respitory Rate                         16                   

       10/25/2019   

                                                    [facility]     

      

        

 

                    Systolic (mm Hg)                         154                

          10/25/2019

                                                    [facility]     

   

           

 

                    Diastolic (mm Hg)                         80                

          10/25/2019

                                                    [facility]     

   

           

 

                    Respitory Rate                         19                   

       10/24/2019   

                                                    [facility]     

      

        

 

                    Systolic (mm Hg)                         169                

          10/24/2019

                                                    [facility]     

   

           

 

                    Diastolic (mm Hg)                         92                

          10/24/2019

                                                    [facility]     

   

           

 

                    Systolic (mm Hg)                         157                

          10/24/2019

                                                    [facility]     

   

           

 

                    Diastolic (mm Hg)                         105               

           

10/24/2019                                                   [facility]                    

 

                    Heart Rate                         88                       

   10/24/2019       

                                                    [facility]     

          

    

 

                    Respitory Rate                         18                   

       10/24/2019   

                                                    [facility]     

      

        

 

                    Temperature Oral (F)                         98.4 F         

                

10/24/2019                                                   [facility]                    

 

                    Height                         172.72 cm                    

     10/24/2019     

                                                    [facility]     

        

      

 

                    BMI Calculated                         27.43                

          10/24/2019

                                                    [facility]     

   

           

 

                    Weight                         81.818                       

   10/24/2019       

                                                    [facility]     

          

    



                                                                                
                                                                                
                                                                                
                                                                                
    



Encounters

                    



                    Location                         Location Details           

              

Encounter Type                         Encounter Number                         

Reason For Visit                         Attending Provider                     

                    ADM Date                         DC Date                    

     Status      

                                        Source                    

 

                    [facility]                                   

               

Emergency                         [id number]                                  

                          Meghana Phillipsson                          10/24/2019    

           

                    10/25/2019                                                  

[facility]                    



                                                                        



Procedures

        



                                        No Data Provided for This Section



                                                    



Assessment and Plan

                    



                                        No Data Provided for This Section       

             



                                     



Plan of Care





                                        No Data Provided for This Section       

             



                                                                



Social History

                    



                    Social History                         Date                 

        Source      

             

 

                                        Social History TypeResponse

Smoking Status

Never smoker; Exposure to Tobacco Smoke None; Cigarette Smoking Last 365 Days 
No; Reg Smoking Cessation Counseling No

entered on: 10/24/19

                          10/24/2019                         [facility]                    



                                                                    



Family History

                    



                                        No Data Provided for This Section       

             



                                                            



Advance Directives

                    



                                        No Data Provided for This Section       

             



                                                            



Functional Status

                    



                                        No Data Provided for This Section

## 2020-11-10 NOTE — NUR
patient received awake, alert, lying quietly in bed. no c/o pain noted at this time. patient 
npo for hida scan in am. patient verbalizes understanding of this. pm assessment complete. 
call bell placed within reach. patient instructed to call for assistance when needed.

## 2020-11-10 NOTE — XMS REPORT
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 Comments

 

             INR (test code = INR) 0.94 1       0.85-1.17                  





[facility]2019-10-24 22:43:07* 



             Test Item    Value        Reference Range Interpretation Comments

 

             PT (test code = PT) 12.4 s       12.0-14.7                  





[facility]2019-10-24 22:43:07* 



             Test Item    Value        Reference Range Interpretation Comments

 

             PTT (test code = PTT) 32.1 s       22.9-35.8                  





[facility]2019-10-24 22:43:07* 



             Test Item    Value        Reference Range Interpretation Comments

 

             ACT (TEG) Rapid (test code = ACT (TEG) Rapid) 74 s           

                   





[facility]2019-10-24 22:43:07* 



             Test Item    Value        Reference Range Interpretation Comments

 

             Split Point Rapid (test code = Split Point Rapid) 0.2 min          

                       





[facility]2019-10-24 22:43:07* 



             Test Item    Value        Reference Range Interpretation Comments

 

             R-time Rapid (test code = R-time Rapid) 0.2 min      0.4-0.7       

             





[facility]2019-10-24 22:43:07* 



             Test Item    Value        Reference Range Interpretation Comments

 

             K-time Rapid (test code = K-time Rapid) 0.8 min      0.6-2.3       

             





[facility]2019-10-24 22:43:07* 



             Test Item    Value        Reference Range Interpretation Comments

 

             Angle Rapid (test code = Angle Rapid) 81 degrees   64-80           

           





[facility]2019-10-24 22:43:07* 



             Test Item    Value        Reference Range Interpretation Comments

 

             Max Amplitude Rapid (test code = Max Amplitude Rapid) 79 mm        

52-71                      





[facility] LermopwOADCGIQUCM0622-35-24 22:43:0718.5[facility]
2019-10-24 22:43:070.2[facility] SgkryzfNAQEMBKQIT6936-30-98 22:43:0765.9[facility] 
KkafqkdSNUCXXUJXG4190-05-61 22:43:0718.6[facility] HoghkrwQEJQKEEMOL6813-82-73 
22:43:078.8[facility] XtnojftBJARUIXUEB0263-80-92 22:43:076.1[facility]
LCIJWSDIIP0443-04-78 22:43:070.6[facility] HrgmhtmPLUVKWDZEB4196-69-82 22:43:073.9
[facility] HfzffjkNXAVQXIOJR9490-36-43 22:43:071.1[facility]ann[facility]
2019-10-24 22:43:070.5[facility] DnlcvudKMCBJRGJAJ9075-07-35 22:43:070.4[facility] 
TsyxkzlTUANDXLADB5247-97-52 22:43:071+ *ABN*(10/24/19 5:43 PM)[facility]
CHEST SINGLE (PORTABLE)2019-10-23 12:45:00                                      
                                               [place] [address]
     Patient Name: ABE JEREZ                                   MR #: 
[id number]                     : 1950                                  
Age/Sex: 69/M  Acct #: [id number]                              Req #: [id number]
[id number]  Adm Physician:                                                      
Ordered by: ROULA CASTELLON, GREGORY CASTELLON                            Report #: [id number]
       Location: ER                                      Room/Bed:              
      ___________________________________________
________________________________________________________    Procedure: [id number]
DX/CHEST SINGLE (PORTABLE)  Exam Date: 10/23/19                            Exam 
Time: 1215                                              REPORT STATUS: Signed   
Chest, 1 view,  10/23/2019.             History: Hypertension, dizziness.      
Comparison: None available.      Findings: The cardiomediastinal silhouette and 
pulmonary vasculature are within   normal limits for a portable exam. There is 
no focal consolidation or pleural   effusion. Mild degenerative changes are pres
ent in the shoulders. There are no   acute osseous or soft tissue abnormalities.
      Impression:    No acute cardiopulmonary abnormality.      Signed by: Magda Marshall on 10/23/2019 12:45 PM        Dictated By: MATT hankins Signed By: MATT MARSHALL MD on 10/23/19 1245  Transcribed By: CATARINO on  1245       COPY TO:   GREGORY CELESTE         CT BRAIN [id number] 
11:36:00                                                                        
             [place] [address]      Patient Name: 
ABE JEREZ                                   MR #: [id number]              
      : 1950                                   Age/Sex: 69/M  Acct #: 
[id number]                              Req #: [id number]  Adm Physician:     
                                                Ordered by: MATT WASHBURN NP 
                          Report #: [id number]        Location: ER               
                      Room/Bed:                     
_____________________________________________
______________________________________________________    Procedure: [id number]
T/CT BRAIN WO  Exam Date: 10/23/19                            Exam Time: 1112   
                                          REPORT STATUS: Signed    CT BRAIN WO  
   HISTORY: High blood pressure      COMPARISON:  None.      TECHNIQUE:    Non
contrast axial scans were obtained from skull base to the vertex.  Coronal   and
sagittal reconstructions obtained from the axial data.  One or more of the   fo
[facility] dose reduction techniques were used: Automated exposure control,   adjus
tment of the mA and/or kV according to patient size, and/or utilization of   ite
rative reconstruction technique.      DISCUSSION:      Scalp/Skull: Unremarkable
.   Brain sulci: Appropriate for patient's age.   Ventricles: Normal in size and
configuration.  No hydrocephalus.   Extra-axial spaces: No masses or fluid margot
ections.  Carotid siphon and   vertebral artery calcifications are present.     
Parenchyma:    Mild periventricular white matter hypodensities are likely chron
ic   microvascular ischemic changes.   There is an old small lacunar infarct in 
the right caudate head.   Otherwise, no mass, hemorrhage, or large vascular terr
itory acute infarct.      Dural sinuses:  No abnormal densities.   Sellar/Supras
ellar region: Intact.   Skull base: Intact.   Incidental findings: None.      IM
PRESSION:      1.  No acute intracranial abnormalities.   2.  Mild supratentoria
l chronic microvascular ischemic change.   3.  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
rick infarct.         Signed by: Dr. Taye Garcia M.D. on 10/23/2019 11:50 AM 
      Dictated By: TAYE GARCIA MD  Electronically Signed By: TAYE BOB MPA, MD on 10/23/19 1150  Transcribed By: CATARINO on 10/23/19 1150       COPY TO:
  MATT WASHBURN NP         BODY FLUID CULTURE + GRAM STAIN2019-10-04 11:16:00
  * 



             Test Item    Value        Reference Range Interpretation Comments

 

             CULTURE (BEAKER) (test code = 1095) STAPHYLOCOCCUS AUREUS          

    A            4+ Staphylococcus 

aureus

 

             Clindamycin (test code = 10)                           R           

  

 

             Erythromycin (test code = 4)                           R           

  

 

             Linezolid (test code = 40)                           S             

 

             Nitrofurantoin (test code = 23)                           S        

     

 

             Oxacillin (test code = 14)                           S             

 

             Rifampin (test code = 43)                           S             

 

             Tetracycline (test code = 2)                           S           

  

 

             Trimethoprim + Sulfamethoxazole (test code = 47)                   

        S             

 

             Vancomycin (test code = 13)                           S            

 

 

             CULTURE (BEAKER) (test code = 1095) STAPHYLOCOCCUS AUREUS          

    A            4+ Staphylococcus 

aureusof a second type

 

             Clindamycin (test code = 10)                           R           

  

 

             Erythromycin (test code = 4)                           R           

  

 

             Linezolid (test code = 40)                           S             

 

             Nitrofurantoin (test code = 23)                           S        

     

 

             Oxacillin (test code = 14)                           S             

 

             Rifampin (test code = 43)                           S             

 

             Tetracycline (test code = 2)                           S           

  

 

             Trimethoprim + Sulfamethoxazole (test code = 47)                   

        S             

 

             Vancomycin (test code = 13)                           S            

 

 

             GRAM STAIN RESULT (BEAKER) (test code = 1123) 3+ White blood cells 

seen                            

 

             GRAM STAIN RESULT (BEAKER) (test code = 319231) No organisms seen  

                          





POCT-GLUCOSE METER2019-10-03 12:19:00* 



             Test Item    Value        Reference Range Interpretation Comments

 

             POC-GLUCOSE METER (BEAKER) (test code = 1538) 286 mg/dL      

     H            TESTED AT [facility]

[address] [facility] [address]





POCT-GLUCOSE METER2019-10-03 07:47:00* 



             Test Item    Value        Reference Range Interpretation Comments

 

             POC-GLUCOSE METER (BEAKER) (test code = 1538) 169 mg/dL      

     H            TESTED AT [facility]

[address] [facility] [address]





BASIC METABOLIC PANEL2019-10-03 05:34:00* 



             Test Item    Value        Reference Range Interpretation Comments

 

             SODIUM (BEAKER) (test code = 381) 139 meq/L    136-145             

       

 

             POTASSIUM (BEAKER) (test code = 379) 4.3 meq/L    3.5-5.1          

          

 

             CHLORIDE (BEAKER) (test code = 382) 103 meq/L                

         

 

             CO2 (BEAKER) (test code = 355) 29 meq/L     22-29                  

    

 

             BLOOD UREA NITROGEN (BEAKER) (test code = 354) 10 mg/dL     7-21   

                    

 

             CREATININE (BEAKER) (test code = 358) 0.96 mg/dL   0.57-1.25       

           

 

             GLUCOSE RANDOM (BEAKER) (test code = 652) 159 mg/dL          

 H             

 

             CALCIUM (BEAKER) (test code = 697) 9.0 mg/dL    8.4-10.2           

        

 

             EGFR (BEAKER) (test code = 1092) 78 mL/min/1.73 sq m               

            ESTIMATED GFR IS NOT AS

ACCURATE AS CREATININE CLEARANCE IN PREDICTING GLOMERULAR FILTRATION RATE. 
ESTIMATED GFR IS NOT APPLICABLE FOR DIALYSIS PATIENTS.





CBC W/PLT COUNT & AUTO DIFFERENTIAL2019-10-03 04:46:00* 



             Test Item    Value        Reference Range Interpretation Comments

 

             WHITE BLOOD CELL COUNT (BEAKER) (test code = 775) 5.6 K/ L     3.5-

10.5                   

 

             RED BLOOD CELL COUNT (BEAKER) (test code = 761) 3.24 M/ L    4.63-6

.08    L             

 

             HEMOGLOBIN (BEAKER) (test code = 410) 8.2 GM/DL    13.7-17.5    L  

           

 

             HEMATOCRIT (BEAKER) (test code = 411) 28.1 %       40.1-51.0    L  

           

 

             MEAN CORPUSCULAR VOLUME (BEAKER) (test code = 753) 86.7 fL      79.

0-92.2                  

 

             MEAN CORPUSCULAR HEMOGLOBIN (BEAKER) (test code = 751) 25.3 pg     

 25.7-32.2    L             

 

                    MEAN CORPUSCULAR HEMOGLOBIN CONC (BEAKER) (test code = 752) 

29.2 GM/DL          32.3-36.5

                          L                          

 

             RED CELL DISTRIBUTION WIDTH (BEAKER) (test code = 412) 17.2 %      

 11.6-14.4    H             

 

             PLATELET COUNT (BEAKER) (test code = 756) 423 K/CU MM  150-450     

               

 

             MEAN PLATELET VOLUME (BEAKER) (test code = 754) 9.5 fL       9.4-12

.4                   

 

             NUCLEATED RED BLOOD CELLS (BEAKER) (test code = 413) 0 /100 WBC   0

-0                        

 

             NEUTROPHILS RELATIVE PERCENT (BEAKER) (test code = 429) 61 %       

                             

 

             LYMPHOCYTES RELATIVE PERCENT (BEAKER) (test code = 430) 20 %       

                             

 

             MONOCYTES RELATIVE PERCENT (BEAKER) (test code = 431) 11 %         

                           

 

             EOSINOPHILS RELATIVE PERCENT (BEAKER) (test code = 432) 6 %        

                             

 

             BASOPHILS RELATIVE PERCENT (BEAKER) (test code = 437) 0 %          

                           

 

             NEUTROPHILS ABSOLUTE COUNT (BEAKER) (test code = 670) 3.40 K/ L    

1.78-5.38                  

 

             LYMPHOCYTES ABSOLUTE COUNT (BEAKER) (test code = 414) 1.08 K/ L    

1.32-3.57    L             

 

             MONOCYTES ABSOLUTE COUNT (BEAKER) (test code = 415) 0.59 K/ L    0.

30-0.82                  

 

             EOSINOPHILS ABSOLUTE COUNT (BEAKER) (test code = 416) 0.35 K/ L    

0.04-0.54                  

 

             BASOPHILS ABSOLUTE COUNT (BEAKER) (test code = 417) 0.02 K/ L    0.

01-0.08                  

 

             IMMATURE GRANULOCYTES-RELATIVE PERCENT (BEAKER) (test code = 2801) 

2 %          0-1          H             





BLOOD CULTURE2019-10-03 02:01:00* 



             Test Item    Value        Reference Range Interpretation Comments

 

             CULTURE (BEAKER) (test code = 1095) No growth in 5 days            

                





BLOOD CULTURE2019-10-03 02:01:00* 



             Test Item    Value        Reference Range Interpretation Comments

 

             CULTURE (BEAKER) (test code = 1095) No growth in 5 days            

                





POCT-GLUCOSE METER2019-10-02 21:13:00* 



             Test Item    Value        Reference Range Interpretation Comments

 

             POC-GLUCOSE METER (BEAKER) (test code = 1538) 184 mg/dL      

     H            TESTED AT [address] [address]





POCT-GLUCOSE METER2019-10-02 17:46:00* 



             Test Item    Value        Reference Range Interpretation Comments

 

             POC-GLUCOSE METER (BEAKER) (test code = 1538) 161 mg/dL      

     H            TESTED AT [address] [address]





POCT-GLUCOSE METER2019-10-02 11:21:00* 



             Test Item    Value        Reference Range Interpretation Comments

 

             POC-GLUCOSE METER (BEAKER) (test code = 1538) 265 mg/dL      

     H            TESTED AT [address] [address]





POCT-GLUCOSE METER2019-10-02 08:11:00* 



             Test Item    Value        Reference Range Interpretation Comments

 

             POC-GLUCOSE METER (BEAKER) (test code = 1538) 204 mg/dL      

     H            TESTED AT [address] [address]





POCT-GLUCOSE METER2019-10-01 20:54:00* 



             Test Item    Value        Reference Range Interpretation Comments

 

             POC-GLUCOSE METER (BEAKER) (test code = 1538) 176 mg/dL      

     H            TESTED AT [address] [address]





POCT-GLUCOSE METER2019-10-01 17:30:00* 



             Test Item    Value        Reference Range Interpretation Comments

 

             POC-GLUCOSE METER (BEAKER) (test code = 1538) 219 mg/dL      

     H            TESTED AT [address] [address]





POCT-GLUCOSE METER2019-10-01 11:59:00* 



             Test Item    Value        Reference Range Interpretation Comments

 

             POC-GLUCOSE METER (BEAKER) (test code = 1538) 307 mg/dL      

     H            Notified RN 

MD/TESTED AT [address] [address]





POCT-GLUCOSE METER2019-10-01 07:47:00* 



             Test Item    Value        Reference Range Interpretation Comments

 

             POC-GLUCOSE METER (BEAKER) (test code = 1538) 169 mg/dL      

     H            TESTED AT [address] [address]





CBC (HEMOGRAM ONLY)2019-10-01 03:18:00* 



             Test Item    Value        Reference Range Interpretation Comments

 

             WHITE BLOOD CELL COUNT (BEAKER) (test code = 775) 7.2 K/ L     3.5-

10.5                   

 

             RED BLOOD CELL COUNT (BEAKER) (test code = 761) 3.34 M/ L    4.63-6

.08    L             

 

             HEMOGLOBIN (BEAKER) (test code = 410) 8.2 GM/DL    13.7-17.5    L  

           

 

             HEMATOCRIT (BEAKER) (test code = 411) 28.2 %       40.1-51.0    L  

           

 

             MEAN CORPUSCULAR VOLUME (BEAKER) (test code = 753) 84.4 fL      79.

0-92.2                  

 

             MEAN CORPUSCULAR HEMOGLOBIN (BEAKER) (test code = 751) 24.6 pg     

 25.7-32.2    L             

 

                    MEAN CORPUSCULAR HEMOGLOBIN CONC (BEAKER) (test code = 752) 

29.1 GM/DL          32.3-36.5

                          L                          

 

             RED CELL DISTRIBUTION WIDTH (BEAKER) (test code = 412) 17.1 %      

 11.6-14.4    H             

 

             PLATELET COUNT (BEAKER) (test code = 756) 433 K/CU MM  150-450     

               

 

             MEAN PLATELET VOLUME (BEAKER) (test code = 754) 9.0 fL       9.4-12

.4     L             

 

             NUCLEATED RED BLOOD CELLS (BEAKER) (test code = 413) 0 /100 WBC   0

-0                        





POCT-GLUCOSE [id number] 21:19:00* 



             Test Item    Value        Reference Range Interpretation Comments

 

             POC-GLUCOSE METER (BEAKER) (test code = 1538) 219 mg/dL      

     H            TESTED AT [address] [address]





BODY FLUID CELL COUNT WITH [id number] 18:24:00* 



             Test Item    Value        Reference Range Interpretation Comments

 

             APPEARANCE FLUID (BEAKER) (test code = 510) Turbid       Clear     

   A             

 

             COLOR FLUID (BEAKER) (test code = 511) Red          Colorless, Stra

w A             

 

             RBC FLUID (BEAKER) (test code = 513) 225582 /cu mm <=1          H  

           

 

             ADJUSTED WBC FLUID (BEAKER) (test code = 1691) [address]483 /cu mm <=5    

      H             

 

             LINING CELLS (BEAKER) (test code = 1590) [address] /cu mm <=1          

H             

 

             NEUTROPHILS FLUID (BEAKER) (test code = 1656) 52 %                 

                   

 

             LYMPHS FLUID (BEAKER) (test code = 488) 35 %                       

             

 

             MONO/MACROPHAGE FLUID (BEAKER) (test code = 489) 6 %               

                      

 

             EOSINOPHILS FLUID (BEAKER) (test code = 491) 0 %                   

                  

 

             BASO FLUID (BEAKER) (test code = 492) 0 %                          

           

 

             CONTAINER BODY FLUID (BEAKER) (test code = 2873) EDTA Tube         

                      





POCT-GLUCOSE [id number] 17:44:00* 



             Test Item    Value        Reference Range Interpretation Comments

 

             POC-GLUCOSE METER (BEAKER) (test code = 1538) 223 mg/dL      

     H            TESTED AT [facility]

[address] [facility] [address]





CBC W/PLT COUNT & AUTO [id number] 17:12:00* 



             Test Item    Value        Reference Range Interpretation Comments

 

             WHITE BLOOD CELL COUNT (BEAKER) (test code = 775) 7.6 K/ L     3.5-

10.5                   

 

             RED BLOOD CELL COUNT (BEAKER) (test code = 761) 3.20 M/ L    4.63-6

.08    L             

 

             HEMOGLOBIN (BEAKER) (test code = 410) 8.0 GM/DL    13.7-17.5    L  

           

 

             HEMATOCRIT (BEAKER) (test code = 411) 27.4 %       40.1-51.0    L  

           

 

             MEAN CORPUSCULAR VOLUME (BEAKER) (test code = 753) 85.6 fL      79.

0-92.2                  

 

             MEAN CORPUSCULAR HEMOGLOBIN (BEAKER) (test code = 751) 25.0 pg     

 25.7-32.2    L             

 

                    MEAN CORPUSCULAR HEMOGLOBIN CONC (BEAKER) (test code = 752) 

29.2 GM/DL          32.3-36.5

                          L                          

 

             RED CELL DISTRIBUTION WIDTH (BEAKER) (test code = 412) 17.1 %      

 11.6-14.4    H             

 

             PLATELET COUNT (BEAKER) (test code = 756) 449 K/CU MM  150-450     

               

 

             MEAN PLATELET VOLUME (BEAKER) (test code = 754) 9.4 fL       9.4-12

.4                   

 

             NUCLEATED RED BLOOD CELLS (BEAKER) (test code = 413) 0 /100 WBC   0

-0                        

 

             NEUTROPHILS RELATIVE PERCENT (BEAKER) (test code = 429) 72 %       

                             

 

             LYMPHOCYTES RELATIVE PERCENT (BEAKER) (test code = 430) 14 %       

                             

 

             MONOCYTES RELATIVE PERCENT (BEAKER) (test code = 431) 9 %          

                           

 

             EOSINOPHILS RELATIVE PERCENT (BEAKER) (test code = 432) 4 %        

                             

 

             BASOPHILS RELATIVE PERCENT (BEAKER) (test code = 437) 0 %          

                           

 

             NEUTROPHILS ABSOLUTE COUNT (BEAKER) (test code = 670) 5.40 K/ L    

1.78-5.38    H             

 

             LYMPHOCYTES ABSOLUTE COUNT (BEAKER) (test code = 414) 1.06 K/ L    

1.32-3.57    L             

 

             MONOCYTES ABSOLUTE COUNT (BEAKER) (test code = 415) 0.70 K/ L    0.

30-0.82                  

 

             EOSINOPHILS ABSOLUTE COUNT (BEAKER) (test code = 416) 0.29 K/ L    

0.04-0.54                  

 

             BASOPHILS ABSOLUTE COUNT (BEAKER) (test code = 417) 0.02 K/ L    0.

01-0.08                  

 

             IMMATURE GRANULOCYTES-RELATIVE PERCENT (BEAKER) (test code = 2801) 

1 %          0-1                        





[id number] 13:11:00* 



             Test Item    Value        Reference Range Interpretation Comments

 

             PARTIAL THROMBOPLASTIN TIME (BEAKER) (test code = 760) 86.2 seconds

 22.5-36.0    H            







POCT-GLUCOSE [id number] 11:55:00* 



             Test Item    Value        Reference Range Interpretation Comments

 

             POC-GLUCOSE METER (BEAKER) (test code = 1538) 261 mg/dL      

     H            TESTED AT [place] [id number] [place] [facility] [address]





POCT-GLUCOSE [id number] 07:44:00* 



             Test Item    Value        Reference Range Interpretation Comments

 

             POC-GLUCOSE METER (BEAKER) (test code = 1538) 175 mg/dL      

     H            TESTED AT [address] [address]





BASIC METABOLIC [id number] 05:43:00* 



             Test Item    Value        Reference Range Interpretation Comments

 

             SODIUM (BEAKER) (test code = 381) 134 meq/L    136-145      L      

       

 

             POTASSIUM (BEAKER) (test code = 379) 3.8 meq/L    3.5-5.1          

          

 

             CHLORIDE (BEAKER) (test code = 382) 101 meq/L                

         

 

             CO2 (BEAKER) (test code = 355) 25 meq/L     22-29                  

    

 

             BLOOD UREA NITROGEN (BEAKER) (test code = 354) 13 mg/dL     7-21   

                    

 

             CREATININE (BEAKER) (test code = 358) 0.96 mg/dL   0.57-1.25       

           

 

             GLUCOSE RANDOM (BEAKER) (test code = 652) 173 mg/dL          

 H             

 

             CALCIUM (BEAKER) (test code = 697) 8.6 mg/dL    8.4-10.2           

        

 

             EGFR (BEAKER) (test code = 1092) 78 mL/min/1.73 sq m               

            ESTIMATED GFR IS NOT AS

ACCURATE AS CREATININE CLEARANCE IN PREDICTING GLOMERULAR FILTRATION RATE. 
ESTIMATED GFR IS NOT APPLICABLE FOR DIALYSIS PATIENTS.





[id number] 05:22:00* 



             Test Item    Value        Reference Range Interpretation Comments

 

             PARTIAL THROMBOPLASTIN TIME (BEAKER) (test code = 760) 63.2 seconds

 22.5-36.0    H            







[id number] 21:35:00* 



             Test Item    Value        Reference Range Interpretation Comments

 

             PARTIAL THROMBOPLASTIN TIME (BEAKER) (test code = 760) 70.7 seconds

 22.5-36.0    H            







POCT-GLUCOSE [id number] 21:02:00* 



             Test Item    Value        Reference Range Interpretation Comments

 

             POC-GLUCOSE METER (BEAKER) (test code = 1538) 238 mg/dL      

     H            TESTED AT [address] [address]





POCT-GLUCOSE [id number] 16:28:00* 



             Test Item    Value        Reference Range Interpretation Comments

 

             POC-GLUCOSE METER (BEAKER) (test code = 1538) 193 mg/dL      

     H            TESTED AT [address] [address]





POCT-GLUCOSE [id number] 13:01:00* 



             Test Item    Value        Reference Range Interpretation Comments

 

             POC-GLUCOSE METER (BEAKER) (test code = 1538) 292 mg/dL      

     H            TESTED AT [address] [address]





VANCOMYCIN LEVEL, [id number] 11:02:00* 



             Test Item    Value        Reference Range Interpretation Comments

 

             VANCOMYCIN TROUGH (BEAKER) (test code = 522) 8.3 ug/mL    10.0-20.0

    L             





[id number] 10:46:00* 



             Test Item    Value        Reference Range Interpretation Comments

 

             PARTIAL THROMBOPLASTIN TIME (BEAKER) (test code = 760) 56.5 seconds

 22.5-36.0    H            







POCT-GLUCOSE [id number] 08:01:00* 



             Test Item    Value        Reference Range Interpretation Comments

 

             POC-GLUCOSE METER (BEAKER) (test code = 1538) 156 mg/dL      

     H            TESTED AT [facility]

[address] [facility] [address]





BASIC METABOLIC [id number] 06:57:00* 



             Test Item    Value        Reference Range Interpretation Comments

 

             SODIUM (BEAKER) (test code = 381) 132 meq/L    136-145      L      

       

 

             POTASSIUM (BEAKER) (test code = 379) 3.7 meq/L    3.5-5.1          

          

 

             CHLORIDE (BEAKER) (test code = 382) 99 meq/L                 

         

 

             CO2 (BEAKER) (test code = 355) 25 meq/L     22-29                  

    

 

             BLOOD UREA NITROGEN (BEAKER) (test code = 354) 14 mg/dL     7-21   

                    

 

             CREATININE (BEAKER) (test code = 358) 0.96 mg/dL   0.57-1.25       

           

 

             GLUCOSE RANDOM (BEAKER) (test code = 652) 138 mg/dL          

 H             

 

             CALCIUM (BEAKER) (test code = 697) 8.6 mg/dL    8.4-10.2           

        

 

             EGFR (BEAKER) (test code = 1092) 78 mL/min/1.73 sq m               

            ESTIMATED GFR IS NOT AS

ACCURATE AS CREATININE CLEARANCE IN PREDICTING GLOMERULAR FILTRATION RATE. 
ESTIMATED GFR IS NOT APPLICABLE FOR DIALYSIS PATIENTS.





[id number] 06:22:00* 



             Test Item    Value        Reference Range Interpretation Comments

 

             PARTIAL THROMBOPLASTIN TIME (BEAKER) (test code = 760) 73.1 seconds

 22.5-36.0    H            







[id number] 23:09:00* 



             Test Item    Value        Reference Range Interpretation Comments

 

             PARTIAL THROMBOPLASTIN TIME (BEAKER) (test code = 760) 54.9 seconds

 22.5-36.0    H            







CT, DRAINAGE, [id number] 21:32:00Reason for exam:->right ileopsoas abscess
FINAL REPORT PATIENT ID:   [id number] PROCEDURE: CT-guided drainage of a right jannet
opsoas abscess. Dose modulation, iterative reconstruction, and/or weight-based a
djustment of the mA/kV was utilized to reduce the radiation dose to as low as re
asonably achievable. INDICATION: Right iliopsoas abscess. COMPARISON: Recent out
side facility CT. SEDATION: Intravenous moderate sedation was administered by 
diology nursing and monitored under the direction of the undersigned radiologist
. The patient's vital signs were monitored throughout the procedure and recorded
in the patient's medical record by radiology nursing. Total intraservice time of
sedation was 25 minutes. MEDICATIONS: 1 mg Versed, 50 mcg fentanyl DESCRIPTION: 
After obtaining informed written consent, the patient was brought to the [facility] room and placed in the supine position.  Preliminary CT scan revealed a rig
ht iliopsoas abscess. A clear path to the collection was identified. The overlyi
ng skin was prepped and draped in the usual, sterile fashion and local 2% lidoca
ine anesthesia was administered. Under CT guidance, and 19-gauge needle was intr
oduced into the right psoas fluid collection and a path to a void the location o
f the deep circumflex iliac artery. A 19-gauge needle was inserted into the flui
d collection and a wire was placed through the needle. After dilation with a 7 F
rench dilator, an 8 French catheter was inserted into the fluid collection. Appr
oximately 30 mL of perilymphatic fluid was aspirated. The catheter was affixed t
o the skin and connected to the suction bulb. Samples were sent for analysis. Po
st procedure scan showed no immediate complications. IMPRESSION: Successful plac
ement of an 8 French catheter into a right iliopsoas abscess. Signed: Alexsander Humphries
MDReport Verified Date/Time:  2019 21:32:44 Reading Location: [facility] C013Y
CT Body Reading Room      Electronically signed by: ALEXSANDER HUMPHRIES MD on  09:32 PM POCT-GLUCOSE [id number] 21:16:00* 



             Test Item    Value        Reference Range Interpretation Comments

 

             POC-GLUCOSE METER (BEAKER) (test code = 1538) 182 mg/dL      

     H            TESTED AT [place] [id number] [facility] [address]





POCT-GLUCOSE [id number] 17:42:00* 



             Test Item    Value        Reference Range Interpretation Comments

 

             POC-GLUCOSE METER (BEAKER) (test code = 1538) 157 mg/dL      

     H            TESTED AT [address] [address]





[id number] 17:21:00* 



             Test Item    Value        Reference Range Interpretation Comments

 

             PARTIAL THROMBOPLASTIN TIME (BEAKER) (test code = 760) 45.9 seconds

 22.5-36.0    H            







POCT-GLUCOSE [id number] 10:53:00* 



             Test Item    Value        Reference Range Interpretation Comments

 

             POC-GLUCOSE METER (BEAKER) (test code = 1538) 128 mg/dL      

     H            TESTED AT [facility]

[address] [facility] [address]





BASIC METABOLIC [id number] 05:58:00* 



             Test Item    Value        Reference Range Interpretation Comments

 

             SODIUM (BEAKER) (test code = 381) 135 meq/L    136-145      L      

       

 

             POTASSIUM (BEAKER) (test code = 379) 3.6 meq/L    3.5-5.1          

          

 

             CHLORIDE (BEAKER) (test code = 382) 101 meq/L                

         

 

             CO2 (BEAKER) (test code = 355) 23 meq/L     22-29                  

    

 

             BLOOD UREA NITROGEN (BEAKER) (test code = 354) 11 mg/dL     7-21   

                    

 

             CREATININE (BEAKER) (test code = 358) 0.80 mg/dL   0.57-1.25       

           

 

             GLUCOSE RANDOM (BEAKER) (test code = 652) 126 mg/dL          

 H             

 

             CALCIUM (BEAKER) (test code = 697) 9.0 mg/dL    8.4-10.2           

        

 

             EGFR (BEAKER) (test code = 1092) 96 mL/min/1.73 sq m               

            ESTIMATED GFR IS NOT AS

ACCURATE AS CREATININE CLEARANCE IN PREDICTING GLOMERULAR FILTRATION RATE. 
ESTIMATED GFR IS NOT APPLICABLE FOR DIALYSIS PATIENTS.





[id number] 05:08:00* 



             Test Item    Value        Reference Range Interpretation Comments

 

             PARTIAL THROMBOPLASTIN TIME (BEAKER) (test code = 760) 48.7 seconds

 22.5-36.0    H            







POCT-GLUCOSE [id number] 21:27:00* 



             Test Item    Value        Reference Range Interpretation Comments

 

             POC-GLUCOSE METER (BEAKER) (test code = 1538) 225 mg/dL      

     H            TESTED AT [facility]

[address] [facility] [address]





[id number] 20:36:00* 



             Test Item    Value        Reference Range Interpretation Comments

 

             PHOSPHORUS (BEAKER) (test code = 604) 3.7 mg/dL    2.3-4.7         

           





[id number] 20:36:00* 



             Test Item    Value        Reference Range Interpretation Comments

 

             MAGNESIUM (BEAKER) (test code = 627) 1.7 mg/dL    1.6-2.6          

          





BASIC METABOLIC [id number] 20:36:00* 



             Test Item    Value        Reference Range Interpretation Comments

 

             SODIUM (BEAKER) (test code = 381) 133 meq/L    136-145      L      

       

 

             POTASSIUM (BEAKER) (test code = 379) 3.8 meq/L    3.5-5.1          

          

 

             CHLORIDE (BEAKER) (test code = 382) 100 meq/L                

         

 

             CO2 (BEAKER) (test code = 355) 22 meq/L     22-29                  

    

 

             BLOOD UREA NITROGEN (BEAKER) (test code = 354) 14 mg/dL     7-21   

                    

 

             CREATININE (BEAKER) (test code = 358) 0.84 mg/dL   0.57-1.25       

           

 

             GLUCOSE RANDOM (BEAKER) (test code = 652) 159 mg/dL          

 H             

 

             CALCIUM (BEAKER) (test code = 697) 9.2 mg/dL    8.4-10.2           

        

 

             EGFR (BEAKER) (test code = 1092) 91 mL/min/1.73 sq m               

            ESTIMATED GFR IS NOT AS

ACCURATE AS CREATININE CLEARANCE IN PREDICTING GLOMERULAR FILTRATION RATE. 
ESTIMATED GFR IS NOT APPLICABLE FOR DIALYSIS PATIENTS.





HEPATIC FUNCTION [id number] 20:36:00* 



             Test Item    Value        Reference Range Interpretation Comments

 

             TOTAL PROTEIN (BEAKER) (test code = 770) 8.6 gm/dL    6.0-8.3      

H             

 

             ALBUMIN (BEAKER) (test code = 1145) 3.0 g/dL     3.5-5.0      L    

         

 

             BILIRUBIN TOTAL (BEAKER) (test code = 377) 0.3 mg/dL    0.2-1.2    

                

 

             BILIRUBIN DIRECT (BEAKER) (test code = 706) 0.2 mg/dL    0.1-0.5   

                 

 

             ALKALINE PHOSPHATASE (BEAKER) (test code = 346) 105 U/L      

                     

 

             AST (SGOT) (BEAKER) (test code = 353) 18 U/L       5-34            

           

 

             ALT (SGPT) (BEAKER) (test code = 347) 20 U/L       6-55            

           





[id number] 20:28:00* 



             Test Item    Value        Reference Range Interpretation Comments

 

             PARTIAL THROMBOPLASTIN TIME (BEAKER) (test code = 760) 43.3 seconds

 22.5-36.0    H            







6 hours after starting heparin infusion and as indicated per sliding scale
PROTHROMBIN TIME/[id number] 20:27:00* 



             Test Item    Value        Reference Range Interpretation Comments

 

             PROTIME (BEAKER) (test code = 759) 15.4 seconds 11.9-14.2    H     

        

 

             INR (BEAKER) (test code = 370) 1.3          <=5.9                  

    





Effective 2019: PT Reference Range ChangeNew: 11.9-14.2  Previous: 11.7-14.
7RECOMMENDED COUMADIN/WARFARIN INR THERAPY RANGESSTANDARD DOSE: 2.0-3.0  Include
s: PROPHYLAXIS for venous thrombosis, systemic embolization; TREATMENT for venou
s thrombosis and/or pulmonary embolus.HIGH RISK: Target INR is 2.5-3.5 for patie
nts wiht mechanical heart valves.6 hours after starting heparin infusion and as 
indicated per sliding scaleCB (HEMOGRAM ONLY)2019 20:20:00* 



             Test Item    Value        Reference Range Interpretation Comments

 

             WHITE BLOOD CELL COUNT (BEAKER) (test code = 775) 11.5 K/ L    3.5-

10.5     H             

 

             RED BLOOD CELL COUNT (BEAKER) (test code = 761) 3.52 M/ L    4.63-6

.08    L             

 

             HEMOGLOBIN (BEAKER) (test code = 410) 8.7 GM/DL    13.7-17.5    L  

           

 

             HEMATOCRIT (BEAKER) (test code = 411) 30.1 %       40.1-51.0    L  

           

 

             MEAN CORPUSCULAR VOLUME (BEAKER) (test code = 753) 85.5 fL      79.

0-92.2                  

 

             MEAN CORPUSCULAR HEMOGLOBIN (BEAKER) (test code = 751) 24.7 pg     

 25.7-32.2    L             

 

                    MEAN CORPUSCULAR HEMOGLOBIN CONC (BEAKER) (test code = 752) 

28.9 GM/DL          32.3-36.5

                          L                          

 

             RED CELL DISTRIBUTION WIDTH (BEAKER) (test code = 412) 17.3 %      

 11.6-14.4    H             

 

             PLATELET COUNT (BEAKER) (test code = 756) 450 K/CU MM  150-450     

               

 

             MEAN PLATELET VOLUME (BEAKER) (test code = 754) 9.3 fL       9.4-12

.4     L             

 

             NUCLEATED RED BLOOD CELLS (BEAKER) (test code = 413) 0 /100 WBC   0

-0                        





BODY FLUID CULTURE + GRAM [id number] 11:52:00* 



             Test Item    Value        Reference Range Interpretation Comments

 

             CULTURE (BEAKER) (test code = 1095)                           A    

        4+ Staphylococcus aureus

 

             GRAM STAIN RESULT (BEAKER) (test code = 1123) 4+ WBCs              

                   

 

                          GRAM STAIN RESULT (BEAKER) (test code = 23575) 4+ gram

 positive cocci in 

clusters                                                     





POCT-GLUCOSE [id number] 12:41:00* 



             Test Item    Value        Reference Range Interpretation Comments

 

             POC-GLUCOSE METER (BEAKER) (test code = 1538) 286 mg/dL      

     H            TESTED AT [facility]

[address] [facility] [address]





POCT-GLUCOSE [id number] 09:06:00* 



             Test Item    Value        Reference Range Interpretation Comments

 

             POC-GLUCOSE METER (BEAKER) (test code = 1538) 129 mg/dL      

     H            TESTED AT [facility]

[address] [facility] [address]





CBC (HEMOGRAM ONLY)2019 06:39:00* 



             Test Item    Value        Reference Range Interpretation Comments

 

             WHITE BLOOD CELL COUNT (BEAKER) (test code = 775) 7.9 K/ L     3.5-

10.5                   

 

             RED BLOOD CELL COUNT (BEAKER) (test code = 761) 3.66 M/ L    4.63-6

.08    L             

 

             HEMOGLOBIN (BEAKER) (test code = 410) 9.7 GM/DL    13.7-17.5    L  

           

 

             HEMATOCRIT (BEAKER) (test code = 411) 32.0 %       40.1-51.0    L  

           

 

             MEAN CORPUSCULAR VOLUME (BEAKER) (test code = 753) 87.4 fL      79.

0-92.2                  

 

             MEAN CORPUSCULAR HEMOGLOBIN (BEAKER) (test code = 751) 26.5 pg     

 25.7-32.2                  

 

                    MEAN CORPUSCULAR HEMOGLOBIN CONC (BEAKER) (test code = 752) 

30.3 GM/DL          32.3-36.5

                          L                          

 

             RED CELL DISTRIBUTION WIDTH (BEAKER) (test code = 412) 16.3 %      

 11.6-14.4    H             

 

             PLATELET COUNT (BEAKER) (test code = 756) 411 K/CU MM  150-450     

               

 

             MEAN PLATELET VOLUME (BEAKER) (test code = 754) 10.1 fL      9.4-12

.4                   

 

             NUCLEATED RED BLOOD CELLS (BEAKER) (test code = 413) 0 /100 WBC   0

-0                        





BASIC METABOLIC [id number] 06:25:00* 



             Test Item    Value        Reference Range Interpretation Comments

 

             SODIUM (BEAKER) (test code = 381) 137 meq/L    136-145             

       

 

             POTASSIUM (BEAKER) (test code = 379) 3.9 meq/L    3.5-5.1          

         Specimen slightly 

hemolyzed

 

             CHLORIDE (BEAKER) (test code = 382) 103 meq/L                

         

 

             CO2 (BEAKER) (test code = 355) 21 meq/L     22-29        L         

    

 

             BLOOD UREA NITROGEN (BEAKER) (test code = 354) 12 mg/dL     7-21   

                    

 

             CREATININE (BEAKER) (test code = 358) 0.78 mg/dL   0.57-1.25       

          Specimen slightly 

hemolyzed

 

             GLUCOSE RANDOM (BEAKER) (test code = 652) 125 mg/dL          

 H             

 

             CALCIUM (BEAKER) (test code = 697) 9.0 mg/dL    8.4-10.2           

        

 

             EGFR (BEAKER) (test code = 1092) 99 mL/min/1.73 sq m               

            ESTIMATED GFR IS NOT AS

ACCURATE AS CREATININE CLEARANCE IN PREDICTING GLOMERULAR FILTRATION RATE. 
ESTIMATED GFR IS NOT APPLICABLE FOR DIALYSIS PATIENTS.





POCT-GLUCOSE [id number] 21:20:00* 



             Test Item    Value        Reference Range Interpretation Comments

 

             POC-GLUCOSE METER (BEAKER) (test code = 1538) 134 mg/dL      

     H            TESTED AT [facility]

[address] [facility] [address]





BODY FLUID CELL COUNT WITH [id number] 18:42:00* 



             Test Item    Value        Reference Range Interpretation Comments

 

             APPEARANCE FLUID (BEAKER) (test code = 510) Purulent     Clear     

   A             

 

             COLOR FLUID (BEAKER) (test code = 511) Brown        Colorless, Stra

w A            TAN

 

             RBC FLUID (BEAKER) (test code = 513) 236903 /cu mm <=1          H  

           

 

             ADJUSTED WBC FLUID (BEAKER) (test code = 1691) 081878 /cu mm <=5   

       H             

 

             LINING CELLS (BEAKER) (test code = 1590) 0 /cu mm     <=1          

              

 

             NEUTROPHILS FLUID (BEAKER) (test code = 1656) 97 %                 

                   

 

             LYMPHS FLUID (BEAKER) (test code = 488) 2 %                        

             

 

             MONO/MACROPHAGE FLUID (BEAKER) (test code = 489) 1 %               

                      

 

             EOSINOPHILS FLUID (BEAKER) (test code = 491) 0 %                   

                  

 

             BASO FLUID (BEAKER) (test code = 492) 0 %                          

           

 

             CONTAINER BODY FLUID (BEAKER) (test code = 9323) EDTA Tube         

                      





Intracellular cocci present. Degenerated white cells present.CT, DRAINAGE, 
[id number] 18:06:00Drainage of right iliopsoas abcessFINAL REPORT PATIENT 
ID:   [id number] CT-guided drainage of right iliopsoas abscess. CLINICAL HISTORY: 
Drainage of right iliopsoas abcess. COMPARISON STUDY: CT scan dated 2019. Informed consent was obtained from the patient and the risks of the 
procedure were explained including bleeding, infection, damage to lung, bowel, 
blood vessels, nerves and other adjacent structures.  This exam was performed 
according to our department dose optimization program which includes automated 
exposure control, adjustment of the mA and/or kV according to the patient's size
and/or use of iterative reconstruction technique. Sedation: 1% Xylocaine was 
utilized as local analgesia. A total of 2.5 mg of Versed and 150 mcg of fentanyl
were administered using the moderate sedation protocol under the supervision of 
the physician and nurse. Moderate sedation time: 40 minutes. TECHNIQUE: Using 
sterile technique, CT fluoroscopic guidance and initially a 20-gauge introducer 
needle, the tip was inserted into the deep situated 5.0 x 2.7 cm right iliopsoas
collection. A 0.035 Moncada wire was inserted. However, an attempt to dilate a 
tract to the deep situated collection with a six and 7 French wire was un
successful as the dilators could not penetrate the thickened muscle and the wire
coiled. Therefore, using the trocar technique a 7 French pigtail catheter was i
nserted. Approximately 20 cc of pus was aspirated. The sample was submitted to t
 lab for analysis. COMPLICATIONS: None. ESTIMATED BLOOD LOSS: Minimal. Patient
Disposition: The patient was in the same state post procedure as preprocedure.  
IMPRESSION: Successful CT-guided pigtail catheter into a right iliopsoas absces
s. Signed: Jermaine Arvizu MDReport Verified Date/Time:  2019 18:06:55 Flor watson Location: [facility] C013Y CT Body Reading Room      Electronically signed by: 
JERMAINE ARVIZU M.D. on 2019 06:06 PM POCT-GLUCOSE [id number] 
17:34:00* 



             Test Item    Value        Reference Range Interpretation Comments

 

             POC-GLUCOSE METER (BEAKER) (test code = 1538) 113 mg/dL      

     H            TESTED AT [address] [address]





BASIC METABOLIC [id number] 05:40:00* 



             Test Item    Value        Reference Range Interpretation Comments

 

             SODIUM (BEAKER) (test code = 381) 135 meq/L    136-145      L      

       

 

             POTASSIUM (BEAKER) (test code = 379) 3.7 meq/L    3.5-5.1          

          

 

             CHLORIDE (BEAKER) (test code = 382) 100 meq/L                

         

 

             CO2 (BEAKER) (test code = 355) 25 meq/L     22-29                  

    

 

             BLOOD UREA NITROGEN (BEAKER) (test code = 354) 15 mg/dL     7-21   

                    

 

             CREATININE (BEAKER) (test code = 358) 0.90 mg/dL   0.57-1.25       

           

 

             GLUCOSE RANDOM (BEAKER) (test code = 652) 138 mg/dL          

 H             

 

             CALCIUM (BEAKER) (test code = 697) 9.6 mg/dL    8.4-10.2           

        

 

             EGFR (BEAKER) (test code = 1092) 84 mL/min/1.73 sq m               

            ESTIMATED GFR IS NOT AS

ACCURATE AS CREATININE CLEARANCE IN PREDICTING GLOMERULAR FILTRATION RATE. 
ESTIMATED GFR IS NOT APPLICABLE FOR DIALYSIS PATIENTS.





CBC W/PLT COUNT & AUTO [id number] 04:41:00* 



             Test Item    Value        Reference Range Interpretation Comments

 

             WHITE BLOOD CELL COUNT (BEAKER) (test code = 775) 8.9 K/ L     3.5-

10.5                   

 

             RED BLOOD CELL COUNT (BEAKER) (test code = 761) 4.15 M/ L    4.63-6

.08    L             

 

             HEMOGLOBIN (BEAKER) (test code = 410) 10.6 GM/DL   13.7-17.5    L  

           

 

             HEMATOCRIT (BEAKER) (test code = 411) 36.0 %       40.1-51.0    L  

           

 

             MEAN CORPUSCULAR VOLUME (BEAKER) (test code = 753) 86.7 fL      79.

0-92.2                  

 

             MEAN CORPUSCULAR HEMOGLOBIN (BEAKER) (test code = 751) 25.5 pg     

 25.7-32.2    L             

 

                    MEAN CORPUSCULAR HEMOGLOBIN CONC (BEAKER) (test code = 752) 

29.4 GM/DL          32.3-36.5

                          L                          

 

             RED CELL DISTRIBUTION WIDTH (BEAKER) (test code = 412) 16.3 %      

 11.6-14.4    H             

 

             PLATELET COUNT (BEAKER) (test code = 756) 467 K/CU MM  150-450     

 H             

 

             MEAN PLATELET VOLUME (BEAKER) (test code = 754) 9.8 fL       9.4-12

.4                   

 

             NUCLEATED RED BLOOD CELLS (BEAKER) (test code = 413) 0 /100 WBC   0

-0                        

 

             NEUTROPHILS RELATIVE PERCENT (BEAKER) (test code = 429) 75 %       

                             

 

             LYMPHOCYTES RELATIVE PERCENT (BEAKER) (test code = 430) 13 %       

                             

 

             MONOCYTES RELATIVE PERCENT (BEAKER) (test code = 431) 9 %          

                           

 

             EOSINOPHILS RELATIVE PERCENT (BEAKER) (test code = 432) 2 %        

                             

 

             BASOPHILS RELATIVE PERCENT (BEAKER) (test code = 437) 0 %          

                           

 

             NEUTROPHILS ABSOLUTE COUNT (BEAKER) (test code = 670) 6.62 K/ L    

1.78-5.38    H             

 

             LYMPHOCYTES ABSOLUTE COUNT (BEAKER) (test code = 414) 1.13 K/ L    

1.32-3.57    L             

 

             MONOCYTES ABSOLUTE COUNT (BEAKER) (test code = 415) 0.84 K/ L    0.

30-0.82    H             

 

             EOSINOPHILS ABSOLUTE COUNT (BEAKER) (test code = 416) 0.19 K/ L    

0.04-0.54                  

 

             BASOPHILS ABSOLUTE COUNT (BEAKER) (test code = 417) 0.03 K/ L    0.

01-0.08                  

 

             IMMATURE GRANULOCYTES-RELATIVE PERCENT (BEAKER) (test code = 2801) 

1 %          0-1                        





CT, PELVIS, W [id number] 01:11:00Reason for exam:->LEG SWELLINGWhat is 
the patient's sedation requirement?->No SedationFINAL REPORT PATIENT ID:   
[id number] EXAM: CT pelvis with contrast CLINICAL HISTORY: Leg swelling and edema;
suspected proximal vein compromise.  TECHNIQUE: CT pelvis was performed with 
intravenous contrast administration.  This exam was performed according to our 
departmental dose optimization program which includes automated exposure 
control, adjustment of the mA and/or kV according to patient's size and/or use 
of iterative reconstructive technique. COMPARISON:  None FINDINGS: IMAGED 
URETERS: Mild right hydroureter. URINARY BLADDER: Mild diffuse mural thickening,
asymmetric to the right and posterior walls may be due to underdistention, 
cystitis, malignancy.REPRODUCTIVE ORGANS: Absent prostate gland and seminal 
vesicles. IMAGED BOWEL/MESENTERY: Colonic diverticulosis without acute diverti
culitis. No bowel obstruction or abnormal wall thickening. Normal appendix.IMAGE
D PERITONEUM/RETROPERITONEUM: Thickening of the right iliopsoas muscle with an i
ll-defined 5 x 2.7 x 4.2 cm fluid collection suspicious for an abscess. Mild dif
fuse fatty stranding in the pelvis. Trace free fluid in the pelvis. VESSELS: The
study is not optimized for evaluation of the venous structures. There is medial 
displacement and possibly compression of the right external iliac and internal 
iliac veins by the right iliopsoas collection. There are atherosclerotic calcifi
cations of the aorta and branches. LYMPH NODES: No pelvic lymphadenopathy.SOFT T
ISSUES: Small fat-containing bilateral inguinal hernias.BONES: Lower lumbar face
t arthropathy. IMPRESSION: Thickening of the right iliopsoas muscle with an ill-
defined 5 x 2.7 x 4.2 cm fluid collection suspicious for an abscess.  The study 
is not optimized for evaluation of the venous structures. There is medial displa
cement and possibly compression of the right external iliac and internal iliac v
eins by the right iliopsoas collection.  Mild diffuse mural thickening of the ur
inary bladder, asymmetric to the right and posterior walls may be due to underdi
stention, cystitis, malignancy or prior radiation. Mild right hydroureter. Urolo
gic correlation and follow-up is recommended. A follow-up CT urogram may be perf
ormed as clinically warranted. Signed: Arsalan Simon MDRThe [facility] Verified Date/Time
:  2019 01:11:11     Electronically signed by: ARSALAN SIMON MD on
2019 01:11 AM BASIC METABOLIC [id number] 23:15:00* 



             Test Item    Value        Reference Range Interpretation Comments

 

             SODIUM (BEAKER) (test code = 381) 134 meq/L    136-145      L      

       

 

             POTASSIUM (BEAKER) (test code = 379) 4.1 meq/L    3.5-5.1          

          

 

             CHLORIDE (BEAKER) (test code = 382) 101 meq/L                

         

 

             CO2 (BEAKER) (test code = 355) 24 meq/L     22-29                  

    

 

             BLOOD UREA NITROGEN (BEAKER) (test code = 354) 18 mg/dL     7-21   

                    

 

             CREATININE (BEAKER) (test code = 358) 0.89 mg/dL   0.57-1.25       

           

 

             GLUCOSE RANDOM (BEAKER) (test code = 652) 120 mg/dL          

 H             

 

             CALCIUM (BEAKER) (test code = 697) 9.3 mg/dL    8.4-10.2           

        

 

             EGFR (BEAKER) (test code = 1092)                                   

     INSUFFICIENT CLINICAL DATA TO CALCULATE 

ESTIMATED GFR.





B-TYPE NATRIURETIC FACTOR (BNP)2019 23:09:00* 



             Test Item    Value        Reference Range Interpretation Comments

 

             B-TYPE NATRIURETIC PEPTIDE (BEAKER) (test code = 700) 97 pg/mL     

0-100                      





[id number] 23:04:00* 



             Test Item    Value        Reference Range Interpretation Comments

 

             PHOSPHORUS (BEAKER) (test code = 604) 3.9 mg/dL    2.3-4.7         

           





[id number] 23:04:00* 



             Test Item    Value        Reference Range Interpretation Comments

 

             MAGNESIUM (BEAKER) (test code = 627) 1.8 mg/dL    1.6-2.6          

          





HEPATIC FUNCTION [id number] 23:04:00* 



             Test Item    Value        Reference Range Interpretation Comments

 

             TOTAL PROTEIN (BEAKER) (test code = 770) 8.0 gm/dL    6.0-8.3      

              

 

             ALBUMIN (BEAKER) (test code = 1145) 3.1 g/dL     3.5-5.0      L    

         

 

             BILIRUBIN TOTAL (BEAKER) (test code = 377) 0.3 mg/dL    0.2-1.2    

                

 

             BILIRUBIN DIRECT (BEAKER) (test code = 706) 0.2 mg/dL    0.1-0.5   

                 

 

             ALKALINE PHOSPHATASE (BEAKER) (test code = 346) 90 U/L       

                     

 

             AST (SGOT) (BEAKER) (test code = 353) 23 U/L       5-34            

           

 

             ALT (SGPT) (BEAKER) (test code = 347) 24 U/L       6-55            

           





[id number] 23:03:00* 



             Test Item    Value        Reference Range Interpretation Comments

 

             PROTIME (BEAKER) (test code = 759) 14.2 seconds 11.9-14.2          

        

 

             INR (BEAKER) (test code = 370) 1.2          <=5.9                  

    

 

             PARTIAL THROMBOPLASTIN TIME (BEAKER) (test code = 760) 33.0 seconds

 22.5-36.0                 







Effective 2019: PT Reference Range ChangeNew: 11.9-14.2  Previous: 11.7-14.
7RECOMMENDED COUMADIN/WARFARIN INR THERAPY RANGESSTANDARD DOSE: 2.0-3.0  Include
s: PROPHYLAXIS for venous thrombosis, systemic embolization; TREATMENT for venou
s thrombosis and/or pulmonary embolus.HIGH RISK: Target INR is 2.5-3.5 for patie
nts wiht mechanical heart valves.CBC W/PLT COUNT & AUTO [id number] 
22:44:00* 



             Test Item    Value        Reference Range Interpretation Comments

 

             WHITE BLOOD CELL COUNT (BEAKER) (test code = 775) 9.1 K/ L     3.5-

10.5                   

 

             RED BLOOD CELL COUNT (BEAKER) (test code = 761) 3.45 M/ L    4.63-6

.08    L             

 

             HEMOGLOBIN (BEAKER) (test code = 410) 9.1 GM/DL    13.7-17.5    L  

           

 

             HEMATOCRIT (BEAKER) (test code = 411) 29.7 %       40.1-51.0    L  

           

 

             MEAN CORPUSCULAR VOLUME (BEAKER) (test code = 753) 86.1 fL      79.

0-92.2                  

 

             MEAN CORPUSCULAR HEMOGLOBIN (BEAKER) (test code = 751) 26.4 pg     

 25.7-32.2                  

 

                    MEAN CORPUSCULAR HEMOGLOBIN CONC (BEAKER) (test code = 752) 

30.6 GM/DL          32.3-36.5

                          L                          

 

             RED CELL DISTRIBUTION WIDTH (BEAKER) (test code = 412) 16.2 %      

 11.6-14.4    H             

 

             PLATELET COUNT (BEAKER) (test code = 756) 393 K/CU MM  150-450     

               

 

             MEAN PLATELET VOLUME (BEAKER) (test code = 754) 9.2 fL       9.4-12

.4     L             

 

             NUCLEATED RED BLOOD CELLS (BEAKER) (test code = 413) 0 /100 WBC   0

-0                        

 

             NEUTROPHILS RELATIVE PERCENT (BEAKER) (test code = 429) 75 %       

                             

 

             LYMPHOCYTES RELATIVE PERCENT (BEAKER) (test code = 430) 12 %       

                             

 

             MONOCYTES RELATIVE PERCENT (BEAKER) (test code = 431) 10 %         

                           

 

             EOSINOPHILS RELATIVE PERCENT (BEAKER) (test code = 432) 2 %        

                             

 

             BASOPHILS RELATIVE PERCENT (BEAKER) (test code = 437) 0 %          

                           

 

             NEUTROPHILS ABSOLUTE COUNT (BEAKER) (test code = 670) 6.82 K/ L    

1.78-5.38    H             

 

             LYMPHOCYTES ABSOLUTE COUNT (BEAKER) (test code = 414) 1.10 K/ L    

1.32-3.57    L             

 

             MONOCYTES ABSOLUTE COUNT (BEAKER) (test code = 415) 0.90 K/ L    0.

30-0.82    H             

 

             EOSINOPHILS ABSOLUTE COUNT (BEAKER) (test code = 416) 0.16 K/ L    

0.04-0.54                  

 

             BASOPHILS ABSOLUTE COUNT (BEAKER) (test code = 417) 0.02 K/ L    0.

01-0.08                  

 

             IMMATURE GRANULOCYTES-RELATIVE PERCENT (BEAKER) (test code = 2801) 

1 %          0-1                        





RETROGRADE [id number] 08:57:00                                         
                                            [place] [address]   
  Patient Name: ABE JEREZ                                   MR #: 
[id number]                     : 1950                                  
Age/Sex: 69/M  Acct #: [id number]                              Req #: [id number]
[id number]  [facility] Physician: PAN ORTIZ MD                                      
Ordered by: PAOLA SMITH MD                            Report #: [id number]      
 Location: [facility]                               Room/Bed: [facility]              
________________________________________________
___________________________________________________    Procedure: [id number] DX/R
ETROGRADE PYELOGRAM  Exam Date: 19                            Exam Time: 1
200                                              REPORT STATUS: Signed       Ret
rograde urethrogram and pyelogram.         History: Right iliac muscle abscess. 
    Comparison: None available.      Discussion: Procedure was performed by uro
logy. Contrast was injected in a   retrograde fashion into the urethra and bilat
eral ureters ureter.  Multiple   images were obtained.  Fluoro time: 0.3 minutes
. Dose: 7.4 mGy (ROSINA)             There is filling of the urethra without eviden
ce of stricture, filling defect,   or luminal irregularity. There is filling of 
bilateral ureters with contrast   noted to flow into the bilateral renal collect
ing systems without evidence of   stricture, filling defect, or luminal irregula
rity.           IMPRESSION:   Normal retrograde urethrogram and pyelogram.      
Signed by: Matt Marshall on 2019 9:09 AM        Dictated By: MATT MARSHALL MD  Electronically Signed By: MATT MARSHALL MD on 19  Transcribed By:
CATARINO on 19       COPY TO:   PAOLA SMITH MD         URETHROGRAM 
(RETRO)2019 08:57:00                                                      
                               [place] [address]      Patient 
Name: ABE JEREZ                                   MR #: [id number]        
            : 1950                                   Age/Sex: 69/M  
Acct #: [id number]                              Req #: [id number]  Adm 
Physician: PAN ORTIZ MD                                      Ordered by: 
PAOLA SMITH MD                            Report #: [id number]        Location: 
[facility]                               Room/Bed: [facility]               
________________________________________________
___________________________________________________    Procedure: [id number] DX/U
RETHROGRAM (RETRO)  Exam Date: 19                            Exam Time: 11
36                                              REPORT STATUS: Signed       Retr
ograde urethrogram and pyelogram.         History: Right iliac muscle abscess.  
   Comparison: None available.      Discussion: Procedure was performed by urol
ogwild. Contrast was injected in a   retrograde fashion into the urethra and bilate
ral ureters ureter.  Multiple   images were obtained.  Fluoro time: 0.3 minutes.
Dose: 7.4 mGy (ROSINA)             There is filling of the urethra without evidence
of stricture, filling defect,   or luminal irregularity. There is filling of b
ilateral ureters with contrast   noted to flow into the bilateral renal collecti
ng systems without evidence of   stricture, filling defect, or luminal irregular
ity.           IMPRESSION:   Normal retrograde urethrogram and pyelogram.       
Signed by: Matt Marshall on 2019 9:09 AM        Dictated By: MATT MARSHALL MD  Electronically Signed By: MATT MARSHALL MD on 19  Transcribed By:
CATARINO on 19       COPY TO:   PAOLA SMITH MD         Bedside Glucose
2019 11:45:00* 



             Test Item    Value        Reference Range Interpretation Comments

 

             Bedside Glucose (test code = 26641-7) 152                 H  

           





Meter ID: [id number] [facility]Different[facility] 
Cells [id number] 08:10:00* 



             Test Item    Value        Reference Range Interpretation Comments

 

                          Differential Total Cells Counted (test code = Differajith

tial Total Cells Counted) 

100                                                          





[facility] HospitalNeut[facility]ils % (Manual)2019 08:10:00
  * 



             Test Item    Value        Reference Range Interpretation Comments

 

             Neutrophils % (Manual) (test code = 87429-2) 82           40-74    

    H             





[facility] HospitalLymphocytes % (Manual)2019 08:10:00
  * 



             Test Item    Value        Reference Range Interpretation Comments

 

             Lymphocytes % (Manual) (test code = 737-7) 8            19-48      

  L             





[facility] HospitalMonocytes % (Manual)2019 08:10:00* 



             Test Item    Value        Reference Range Interpretation Comments

 

             Monocytes % (Manual) (test code = 744-3) 8            3.4-9.0      

              





[facility] HospitalEosinophils % (Manual)2019 08:10:00
  * 



             Test Item    Value        Reference Range Interpretation Comments

 

             Eosinophils % (Manual) (test code = 714-6) 2            0-7        

                





[facility] HospitalPlatelet [id number] 08:10:00* 



             Test Item    Value        Reference Range Interpretation Comments

 

             Platelet Estimate (test code = 13725-9) ADEQUATE                   

             





[facility] HospitalPlatelet Morphology [id number] 
08:10:00* 



             Test Item    Value        Reference Range Interpretation Comments

 

             Platelet Morphology Comment (test code = 82733-8) NORMAL           

                       





[facility] HospitalProthrombin [id number] 07:13:00* 



             Test Item    Value        Reference Range Interpretation Comments

 

             Prothrombin Time (test code = 5902-2) 13.8         11.9-14.5       

           





[facility] HospitalProthromb Time International Ratio
2019 07:13:00* 



             Test Item    Value        Reference Range Interpretation Comments

 

             Prothromb Time International Ratio (test code = 6301-6) 1.01       

                             





Oral Anticoagulant Therapy INR Values:1. Low Intensity Therapy        1.5 - 2.02
. Moderate Intensity Therapy   2.0 - 3.03. High Intensity Therapy(1)    2.5 - 3.
54. High Intensity Therapy(2)    3.0 - 4.05. Panic Value INR              > 5.0
[facility] HospitalActivated Partial Thromboplast Time
2019 07:13:00* 



             Test Item    Value        Reference Range Interpretation Comments

 

             Activated Partial Thromboplast Time (test code = 43445-0) 28.1     

    23.8-35.5                  





[facility] HospitalWhite Blood [id number] 07:11:00* 



             Test Item    Value        Reference Range Interpretation Comments

 

             White Blood Count (test code = 6690-2) 6.78         4.8-10.8       

            





[facility] HospitalRed Blood [id number] 07:11:00* 



             Test Item    Value        Reference Range Interpretation Comments

 

             Red Blood Count (test code = 789-8) 3.46         4.3-5.7      L    

         





[facility] HospitalHemoglobin2019-08-12 07:11:00* 



             Test Item    Value        Reference Range Interpretation Comments

 

             Hemoglobin (test code = 80181-2) 9.2          14.0-18.0    L       

      





[facility] HospitalHematocrit2019-08-12 07:11:00* 



             Test Item    Value        Reference Range Interpretation Comments

 

             Hematocrit (test code = 4544-3) 29.9         38.2-49.6    L        

     





[facility] HospitalMean Corpuscular [id number] 
07:11:00* 



             Test Item    Value        Reference Range Interpretation Comments

 

             Mean Corpuscular Volume (test code = 787-2) 86.4         81-99     

                 





[facility] HospitalMean Corpuscular [id number] 
07:11:00* 



             Test Item    Value        Reference Range Interpretation Comments

 

             Mean Corpuscular Hemoglobin (test code = 785-6) 26.6         28-32 

       L             





[facility] HospitalMean Corpuscular Hemoglobin Concent
2019 07:11:00* 



             Test Item    Value        Reference Range Interpretation Comments

 

             Mean Corpuscular Hemoglobin Concent (test code = 786-4) 30.8       

  31-35        L             





[facility] HospitalRed Cell Distribution [id number] 
07:11:00* 



             Test Item    Value        Reference Range Interpretation Comments

 

             Red Cell Distribution Width (test code = 65162-9) 15.1         11.7

-14.4    H             





[facility] HospitalPlatelet [id number] 07:11:00* 



             Test Item    Value        Reference Range Interpretation Comments

 

             Platelet Count (test code = 777-3) 353          140-360            

        





[facility] HospitalNeutrophils (%) (Auto)2019 07:11:00
  * 



             Test Item    Value        Reference Range Interpretation Comments

 

             Neutrophils (%) (Auto) (test code = 01716-7) 75.4         38.7-80.0

                  





[facility] HospitalLymphocytes (%) (Auto)2019 07:11:00
  * 



             Test Item    Value        Reference Range Interpretation Comments

 

             Lymphocytes (%) (Auto) (test code = 736-9) 8.6          18.0-39.1  

  L             





[facility] HospitalMonocytes (%) (Auto)2019 07:11:00* 



             Test Item    Value        Reference Range Interpretation Comments

 

             Monocytes (%) (Auto) (test code = 5905-5) 11.1         4.4-11.3    

               





[facility] HospitalEosinophils (%) (Auto)2019 07:11:00
  * 



             Test Item    Value        Reference Range Interpretation Comments

 

             Eosinophils (%) (Auto) (test code = 713-8) 4.0          0.0-6.0    

                





[facility] HospitalBasophils (%) (Auto)2019 07:11:00* 



             Test Item    Value        Reference Range Interpretation Comments

 

             Basophils (%) (Auto) (test code = 706-2) 0.3          0.0-1.0      

              





[facility] HospitalIM GRANULOCYTES %2019 07:11:00* 



             Test Item    Value        Reference Range Interpretation Comments

 

             IM GRANULOCYTES % (test code = IM GRANULOCYTES %) 0.6          0.0-

1.0                    





[facility] HospitalNeutrophils # (Auto)2019 07:11:00* 



             Test Item    Value        Reference Range Interpretation Comments

 

             Neutrophils # (Auto) (test code = 751-8) 5.1          2.1-6.9      

              





[facility] HospitalLymphocytes # (Auto)2019 07:11:00* 



             Test Item    Value        Reference Range Interpretation Comments

 

             Lymphocytes # (Auto) (test code = 34690-4) 0.6          1.0-3.2    

  L             





[facility] HospitalMonocytes # (Auto)2019 07:11:00* 



             Test Item    Value        Reference Range Interpretation Comments

 

             Monocytes # (Auto) (test code = 742-7) 0.8          0.2-0.8        

            





[facility] HospitalEosinophils # (Auto)2019 07:11:00* 



             Test Item    Value        Reference Range Interpretation Comments

 

             Eosinophils # (Auto) (test code = 711-2) 0.3          0.0-0.4      

              





[facility] HospitalBasophils # (Auto)2019 07:11:00* 



             Test Item    Value        Reference Range Interpretation Comments

 

             Basophils # (Auto) (test code = 704-7) 0.0          0.0-0.1        

            





[facility] HospitalAbsolute Immature Granulocyte (auto
2019 07:11:00* 



             Test Item    Value        Reference Range Interpretation Comments

 

                                        Absolute Immature Granulocyte (auto (bronson

t code = Absolute Immature Granulocyte 

(auto)          0.04            0-0.1                            





[facility] Hospitalodium [id number] 07:07:00* 



             Test Item    Value        Reference Range Interpretation Comments

 

             Sodium Level (test code = 2951-2) 138          136-145             

       





[facility] HospitalPotassium [id number] 07:07:00* 



             Test Item    Value        Reference Range Interpretation Comments

 

             Potassium Level (test code = 2823-3) 3.4          3.5-5.1      L   

          





[facility] HospitalChloride [id number] 07:07:00* 



             Test Item    Value        Reference Range Interpretation Comments

 

             Chloride Level (test code = 2075-0) 104                      

         





[facility] HospitalCarbon Dioxide [id number] 07:07:00* 



             Test Item    Value        Reference Range Interpretation Comments

 

             Carbon Dioxide Level (test code = 2028-9) 26           -29       

               





[facility] HospitalAnion [id number] 07:07:00* 



             Test Item    Value        Reference Range Interpretation Comments

 

             Anion Gap (test code = 33037-3) 11.4         8-16                  

     





[facility] HospitalBlood Urea Vxqmmuvm0108-02-03 07:07:00* 



             Test Item    Value        Reference Range Interpretation Comments

 

             Blood Urea Nitrogen (test code = 3094-0) 10           7-26         

              





[facility] 07:07:00* 



             Test Item    Value        Reference Range Interpretation Comments

 

             Creatinine (test code = 2160-0) 0.73         0.72-1.25             

     





[facility] HospitalBUN/Creatinine [id number] 07:07:00* 



             Test Item    Value        Reference Range Interpretation Comments

 

             BUN/Creatinine Ratio (test code = 3097-3) 14           -        

               





[facility] HospitalEstimat Glomerular Filtration Rate
2019 07:07:00* 



             Test Item    Value        Reference Range Interpretation Comments

 

             Estimat Glomerular Filtration Rate (test code = 246883181) > 60    

     >60                        





Ranges were taken from the National Kidney Disease Education Program and the [facility] literature.Reference ranges:60 or greater: Xqutxz92-67 (
for 3 consecutive months): Chronic kidney disease 15 or less: Kidney failureQuail Creek Surgical HospitalGlucose Hocjk7309-75-83 07:07:00* 



             Test Item    Value        Reference Range Interpretation Comments

 

             Glucose Level (test code = JML8980) 130                 H    

         





[facility] HospitalCalcium Fmdjh1456-54-82 07:07:00* 



             Test Item    Value        Reference Range Interpretation Comments

 

             Calcium Level (test code = 14873-4) 9.0          8.4-10.2          

         





[facility] HospitalMagnesium Grrgv2593-26-46 07:07:00* 



             Test Item    Value        Reference Range Interpretation Comments

 

             Magnesium Level (test code = 75492-3) 1.6          1.3-2.1         

           





[facility] HospitalTotal Ruogdfnsp7446-61-71 08:30:00* 



             Test Item    Value        Reference Range Interpretation Comments

 

             Total Bilirubin (test code = 1975-2) 0.2          0.2-1.2          

          





[facility] HospitalAspartate Amino Transf (AST/SGOT)
2019 08:30:00* 



             Test Item    Value        Reference Range Interpretation Comments

 

                                        Aspartate Amino Transf (AST/SGOT) (test 

code = Aspartate Amino Transf 

(AST/SGOT))     10              5-34                             





[facility] HospitalAlanine Aminotransferase (ALT/SGPT)
2019 08:30:00* 



             Test Item    Value        Reference Range Interpretation Comments

 

             Alanine Aminotransferase (ALT/SGPT) (test code = 1742-6) 11        

   0-55                       





[facility] HospitalTotal Xhxqcqo4179-58-08 08:30:00* 



             Test Item    Value        Reference Range Interpretation Comments

 

             Total Protein (test code = 2885-2) 6.6          6.5-8.1            

        





[facility] HospitalAlbumin2019-08-11 08:30:00* 



             Test Item    Value        Reference Range Interpretation Comments

 

             Albumin (test code = 1751-7) 2.4          3.5-5.0      L           

  





[facility] HospitalGlobulin2019-08-11 08:30:00* 



             Test Item    Value        Reference Range Interpretation Comments

 

             Globulin (test code = 03369-2) 4.2          2.3-3.5      H         

    





[facility] HospitalAlbumin/Globulin Xdyya8579-07-56 08:30:00
  * 



             Test Item    Value        Reference Range Interpretation Comments

 

             Albumin/Globulin Ratio (test code = 1759-0) 0.6          0.8-2.0   

   L             





[facility] HospitalAlkaline Rflnfofclri8949-24-68 08:30:00* 



             Test Item    Value        Reference Range Interpretation Comments

 

             Alkaline Phosphatase (test code = 6768-6) 61                 

               





[facility] HospitalCT ABDOMEN/PELVIS -08-10 22:49:00   
                                                                                
 [place] [address]      Patient Name: ABE JEREZ          
                        MR #: [id number]                     : 1950    
                              Age/Sex: 69/M  Acct #: [id number]               
              Req #: [id number]  Adm Physician:                                 
                    Ordered by: DUNIA RAMIREZ MD                            
Report #: [id number]        Location: ER                                      
Room/Bed:                     _____________________________________________
______________________________________________________    Procedure: [id number]
T/CT ABDOMEN/PELVIS W  Exam Date: 08/10/19                            Exam Time:
                                              REPORT STATUS: Signed    EXAM:
CT Abdomen and Pelvis WITH contrast     INDICATION: Blood in urine. Painful ur
ination.   COMPARISON: None.   TECHNIQUE: Abdomen and pelvis were scanned utiliz
ing a multidetector helical   scanner from the lung base to the pubic symphysis 
after administration of IV   contrast. Coronal and sagittal reformations were ob
tained. Routine protocol was   performed. Scan was performed when during portal 
venous phase.               IV CONTRAST: 100 cc Isovue-300               ORAL CO
NTRAST: Water               RADIATION DOSE: Total DLP: 712.11 mGy*cm            
   Estimated effective dose: (DLP x 0.015 x size factor) mSv               COMP
LICATIONS: None      FINDINGS:      LINES and TUBES: None.      LOWER THORAX:  6
mm groundglass nodule in the right middle lobe on image 1   series 2. Bibasilar 
dependent atelectasis. 8 mm noncalcified subpleural nodule   in the lingula on 
images 6 series 2. Minimal lingular atelectasis on image 6   series 2.      HEPA
TOBILIARY:      No focal hepatic lesions. No biliary ductal dilation.       GALL
BLADDER: No radio-opaque stones or sludge.  No wall thickening.      SPLEEN: No 
splenomegaly.       PANCREAS: No focal masses or ductal dilatation.        ADREN
ALS: No adrenal nodules          KIDNEYS/URETERS: Kidneys enhance symmetrically.
 No hydronephrosis. 3.6 cm cyst   in the lower pole of the left kidney associat
ed with punctate calcification.    No stones.      GI TRACT: No abnormal distent
ion, wall thickening, or evidence of bowel   obstruction.       Appendix is norm
al.      PELVIC ORGANS/BLADDER: There is diffuse asymmetric wall thickening of t
he   urinary bladder, associated with perivesicular fat stranding, which may ref
lect   cystitis in the proper clinical setting.      LYMPH NODES: No lymphadenop
athy.      VESSELS: There is mild atherosclerotic disease in the aorta and major
arterial   branches.      PERITONEUM / RETROPERITONEUM: No free air or fluid.   
  BONES: Bilateral small fat-containing inguinal hernias.      SOFT TISSUES: T
here is a low-attenuation fluid collection within the right   iliac is muscle wi
th mild surrounding peripheral enhancement measuring 4.9 x   2.8 cm on image 73 
series 2 which may represent an abscess.      IMPRESSION:    1.  Findings consis
tent with cystitis in the proper clinical setting. Recommend   follow-up after t
reatment to document resolution and adnexal the underlying   pathology such as n
eoplasm.   2.  4.9 cm mildly peripherally enhancing collection within the right 
iliac is   muscle may represent an abscess. Bursal fluid is felt to be less like
ly.   3.  Minimally complex cyst in the lower pole of the left knee.      Signed
by: Dr. LUIS ARMANDO Chahal M.D. on 8/10/2019 11:08 PM        Dictated By: ROQUE CHAHAL MD, MD  Electronically Signed By: ANIRUDH CHAHAL MD, MD on 08/10/19 2
308  Transcribed By: CATARINO on 08/10/19 7133       COPY TO:   DUNIA RAMIREZ         Urine [id number] 20:17:00* 



             Test Item    Value        Reference Range Interpretation Comments

 

             Urine WBC (test code = 5821-4) 0-5          0-5                    

    





[facility] Hospital[facility] RBC2019-08-10 20:17:00* 



             Test Item    Value        Reference Range Interpretation Comments

 

             Urine RBC (test code = 36814-6) >50          0-5          H        

     





[facility] HospitalU[facility] Bacteria2019-08-10 20:17:00* 



             Test Item    Value        Reference Range Interpretation Comments

 

             Urine Bacteria (test code = 73203-1) RARE         NONE             

          





[facility] HospitalU[facility] Epithelial Cells2019-08-10 20:17:00
  * 



             Test Item    Value        Reference Range Interpretation Comments

 

             Urine Epithelial Cells (test code = 15019-9) RARE         NONE     

                  





[facility] HospitalU[facility] Color2019-08-10 20:01:00* 



             Test Item    Value        Reference Range Interpretation Comments

 

             Urine Color (test code = 5778-6) YELLOW       YELLOW               

      





[facility] HospitalUrine Qfnegag2064-10-87 20:01:00* 



             Test Item    Value        Reference Range Interpretation Comments

 

             Urine Clarity (test code = 90138-5) SL CLOUDY    CLEAR        H    

         





[facility] HospitalU[facility] Specific Gravity2019-08-10 20:01:00
  * 



             Test Item    Value        Reference Range Interpretation Comments

 

             Urine Specific Gravity (test code = 5811-5) 1.020        1.010-1.02

5                





[facility] Hospital[facility] pH2019-08-10 20:01:00* 



             Test Item    Value        Reference Range Interpretation Comments

 

             Urine pH (test code = 27997-5) 7            5-7                    

    





[facility] Hospital[facility] Leukocyte Esterase2019-08-10 
20:01:00* 



             Test Item    Value        Reference Range Interpretation Comments

 

             Urine Leukocyte Esterase (test code = 86533-7) NEGATIVE     NEGATIV

E                   





[facility] Nitrite2019-08-10 20:01:00* 



             Test Item    Value        Reference Range Interpretation Comments

 

             Urine Nitrite (test code = 83668-9) NEGATIVE     NEGATIVE          

         





[facility] Hospital[facility] Protein2019-08-10 20:01:00* 



             Test Item    Value        Reference Range Interpretation Comments

 

             Urine Protein (test code = 57735-3) 3+           NEGATIVE     H    

         





[facility] Glucose (UA)2019-08-10 20:01:00* 



             Test Item    Value        Reference Range Interpretation Comments

 

             Urine Glucose (UA) (test code = 01985-7) 3+           NEGATIVE     

              





[facility] Hospital[facility] [id number] 20:01:00* 



             Test Item    Value        Reference Range Interpretation Comments

 

             Urine Ketones (test code = 70340-6) NEGATIVE     NEGATIVE          

         





[facility] Hospital[facility] Urobilinogen2019-08-10 20:01:00* 



             Test Item    Value        Reference Range Interpretation Comments

 

             Urine Urobilinogen (test code = 91820-1) 0.2          0.2-1        

              





[facility] Bilirubin2019-08-10 20:01:00* 



             Test Item    Value        Reference Range Interpretation Comments

 

             Urine Bilirubin (test code = 1977-8) NEGATIVE     NEGATIVE         

          





[facility] Hospital[facility] Blood2019-08-10 20:01:00* 



             Test Item    Value        Reference Range Interpretation Comments

 

             Urine Blood (test code = 01821-2) 3+           NEGATIVE            

       





[facility] WHOLE [id number] 
14:41:14CLINICAL INDICATION:   dx: c61, r/o mets, prostate ca, 
asymptomaticMODALITY:  [employer] dual head gamma cameraTECHNIQUE:  25 mCi Tc 
99m MDP are injected IV. After a suitable time delay, whole body imaging images 
are obtained.FINDINGS:COMPARISON:   Multi para metric prostate MRI.Symmetric 
bilateral renal function is observed.Periodontal uptake is noted within the 
maxilla and right mandible.There are mild to moderate arthritic changes seen at 
the acromioclavicular, glenohumeral, sternoclavicular, wrist, medial knee 
compartments bilaterally. Mild to moderate thoracic spondylosis is seen. Facet 
arthritic changes are noted left L4-5 and right L5-S1 facet. Focal uptake is 
present at the dorsal left calcaneus.No lytic or blastic osseous metastasis is 
observed.IMPRESSION:Negative for evidence of osteoblastic metastatic 
disease.PQRS 147: 3570F

## 2020-11-10 NOTE — XMS REPORT
Clinical Summary

                             Created on: 11/10/2020



Leland Lacy

External Reference #: [id number]

: 1950

Sex: Male



Demographics





                          Address                   [address] [place] [address]  [address]

 

                          Home Phone                [phone]

 

                          Preferred Language        Unknown

 

                          Marital Status            Unknown

 

                          D[place]ze Affiliation     Confucianist

 

                          Race                      White

 

                          Ethnic Group              /Latin





Author





                          Author                    SAE[facility] AirwootCass[facility] Regional Medical CenterInfinisourceH[facility]

 

                          Address                   Unknown

 

                          Phone                     Unavailable







Support





                Name            Relationship    Address         Phone

 

                Marilee Smith   [employer]            Unknown         [phone]







Care Team Providers





                    Care Team Member Name Role                Phone

 

                          PCP                       Unavailable







Allergies

No Known Allergies



Medications





                          End Date                  Status



              Medication   Sig          Dispensed    Refills      Start  



                                         Date  

 

                                                    Active



                     amLODIPine (NORVASC) 10  Take 10 mg by       0   



                           MG tablet                 mouth daily.     

 

                                                    Active



                     lansoprazole (PREVACID)  Take 30 mg by       0   



                           30 MG capsule             mouth daily.     

 

                                                    Active



                     metFORMIN (GLUCOPHAGE)  Take 1,000 mg       0   



                           1000 MG tablet            by mouth 2     



                                         (two) times     



                                         daily with     



                                         breakfast and     



                                         dinner.     

 

                                                    Active



                     carvedilol (COREG) 12.5  Take 12.5 mg        0   



                           MG tablet                 by mouth 2     



                                         (two) times     



                                         daily with     



                                         breakfast and     



                                         dinner.     

 

                                                    Active



                     empagliflozin (JARDIANCE)  Take 25 mg by       0   



                           25 mg tablet              mouth daily.     

 

                                                    Active



                     glimepiride (AMARYL) 2 MG  Take 2 mg by        0   



                           tablet                    mouth 2 (two)     



                                         times daily     



                                         with     



                                         breakfast and     



                                         lunch.     

 

                                                    Active



                     SITagliptin (JANUVIA) 100  Take 100 mg         0   



                           MG tablet                 by mouth     



                                         daily.     

 

                                                    Active



                     atorvastatin (LIPITOR) 10  Take 10 mg by       0   



                           MG tablet                 mouth     



                                         nightly.     

 

                                                    Active



                     lisinopril          Take 40 mg by       0   



                           (PRINIVIL,ZESTRIL) 40 MG  mouth daily.     



                                         tablet      

 

                                                    Active



                     aspirin 81 MG EC tablet  Take 81 mg by       0   



                                         mouth daily.     

 

                          2020                



              gabapentin (NEURONTIN)  Take 1       90 capsule   0              



                     300 MG capsule      capsule (300        9  



                                         mg total) by     



                                         mouth 3     



                                         (three) times     



                                         daily.     

 

                          2020                



              oxybutynin (DITROPAN) 5  Take 1 tablet  90 tablet    0            

  



                     MG tablet           (5 mg total)        9  



                                         by mouth 3     



                                         (three) times     



                                         daily.     







Active Problems





 



                           Problem                   Noted Date

 

 



                           Severe protein-calorie malnutrition  10/01/2019

 

 



                           DVT (deep venous thrombosis)  2019

 

 



                           Psoas muscle abscess      2019

 

 



                           Type 2 diabetes mellitus  2019

 

 



                           Prostate cancer           2019







Family History





   



                 Medical History  Relation        Name            Comments

 

   



                           Diabetes                  Mother  







   



                 Relation        Name            Status          Comments

 

   



                           Mother                     



                                         (Age 57) 







Social History





                                        Date



                 Tobacco Use     Types           Packs/Day       Years Used 

 

                                         



                                         Never Smoker    

 

    



                                         Smokeless Tobacco: Never   



                                         Used   







                    Drinks/Week         oz/Week             Comments



                                         Alcohol Use   

 

                                                             



                                         No   







  



                     Alcohol Habits      Answer              Date Recorded

 

  



                     How often do you have a drink containing alcohol?  Never   

            2019

 

  



                           How many drinks containing alcohol do you have on  No

t asked 



                                         a typical day when you are drinking?  

 

  



                           How often do you have six or more drinks on one  Not 

asked 



                                         occasion?  







 



                           Sex Assigned at Birth     Date Recorded

 

 



                                         Not on file 







Last Filed Vital Signs

Not on file



Plan of Treatment





   



                 Health Maintenance  Due Date        Last Done       Comments

 

   



                           DIABETIC EYE EXAM         1962  

 

   



                           DIABETIC FOOT EXAM        1962  

 

   



                           URINE MICROALBUMIN        1962  

 

   



                           MEDICARE ANNUAL WELLNESS  2017  



                                         (YEAR 2 or FIRST YEAR if   



                                         no IPPE)   

 

   



                           HEMOGLOBIN A1C            2019  

 

   



                     INFLUENZA VACCINE (#1)  2020          10/10/2018, 



                                         2016, 



                                         2014, 



                                         Additional 



                                         history 



                                         exists 

 

   



                     COLON CANCER SCREENING  2028 



                                         COLONOSCOPY   

 

   



                     PNEUMOCOCCAL 65+ YRS  Completed           10/31/2018, 



                                         2016, 



                                         2010 







Results

Not on fileafter 11/10/2019



Insurance





                                        Type



            Payer      Benefit    Subscriber ID  Effective  Phone      Address 



                           Plan /                    Dates   



                                         Group     

 

                                         



                 [facility]      [id number]     2016-P   



                           MEDICARE                  resent   



                                         ADV     







     



            Guarantor Name  Account    Relation to  Date of    Phone      Billin

g Address



                     Type                Patient             Birth  

 

     



            Leland Lacy  Personal/F  Self       1950  [phone]  63

[address] [facility]               ([place])              [place] [address]

[address]







Advance Directives





For more information, please contact: [phone]







                          Date Inactivated          Comments



                           Code Status               Date Activated  

 

                          10/3/2019  6:58 PM         



                           Full Code                 2019  7:16 PM  







  



                           This code status was determined by:  Patient 







                                                     

 

                          2019  6:04 PM          



                           Full Code                 2019  2:02 AM  







  



                           This code status was determined by:  Patient

## 2020-11-10 NOTE — XMS REPORT
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  

No acute intracranial abnormality or calvarial fracture.

                          10/24/2019                         [facility]                    



                                                                                
                   



Consultation Notes

                    



                                        No Data Provided for This Section       

             



                                                            



Discharge Summaries

                    



                                        No Data Provided for This Section       

             



                                                            



History and Physicals

                    



                                        No Data Provided for This Section       

             



                                                                



Vital Signs

                     



                    Vital Sign                         Value                    

     Date           

                          Comments                         Source               

     

 

                    Temperature Oral (F)                         98 F           

              

10/25/2019                                                   [facility]                    

 

                    Heart Rate                         77                       

   10/25/2019       

                                                    [facility]     

          

    

 

                    Respitory Rate                         16                   

       10/25/2019   

                                                    [facility]     

      

        

 

                    Systolic (mm Hg)                         154                

          10/25/2019

                                                    [facility]     

   

           

 

                    Diastolic (mm Hg)                         80                

          10/25/2019

                                                    [facility]     

   

           

 

                    Respitory Rate                         19                   

       10/24/2019   

                                                    [facility]     

      

        

 

                    Systolic (mm Hg)                         169                

          10/24/2019

                                                    [facility]     

   

           

 

                    Diastolic (mm Hg)                         92                

          10/24/2019

                                                    [facility]     

   

           

 

                    Systolic (mm Hg)                         157                

          10/24/2019

                                                    [facility]     

   

           

 

                    Diastolic (mm Hg)                         105               

           

10/24/2019                                                   [facility]                    

 

                    Heart Rate                         88                       

   10/24/2019       

                                                    [facility]     

          

    

 

                    Respitory Rate                         18                   

       10/24/2019   

                                                    [facility]     

      

        

 

                    Temperature Oral (F)                         98.4 F         

                

10/24/2019                                                   [facility]                    

 

                    Height                         172.72 cm                    

     10/24/2019     

                                                    [facility]     

        

      

 

                    BMI Calculated                         27.43                

          10/24/2019

                                                    [facility]     

   

           

 

                    Weight                         81.818                       

   10/24/2019       

                                                    [facility]     

          

    



                                                                                
                                                                                
                                                                                
                                                                                
    



Encounters

                    



                    Location                         Location Details           

              

Encounter Type                         Encounter Number                         

Reason For Visit                         Attending Provider                     

                    ADM Date                         DC Date                    

     Status      

                                        Source                    

 

                    [facility]                                   

               

Emergency                         [id number]                                  

                          Meghana Phillipsson                          10/24/2019    

           

                    10/25/2019                                                  

[facility]                    



                                                                        



Procedures

        



                                        No Data Provided for This Section



                                                    



Assessment and Plan

                    



                                        No Data Provided for This Section       

             



                                     



Plan of Care





                                        No Data Provided for This Section       

             



                                                                



Social History

                    



                    Social History                         Date                 

        Source      

             

 

                                        Social History TypeResponse

Smoking Status

Never smoker; Exposure to Tobacco Smoke None; Cigarette Smoking Last 365 Days 
No; Reg Smoking Cessation Counseling No

entered on: 10/24/19

                          10/24/2019                         [facility]                    



                                                                    



Family History

                    



                                        No Data Provided for This Section       

             



                                                            



Advance Directives

                    



                                        No Data Provided for This Section       

             



                                                            



Functional Status

                    



                                        No Data Provided for This Section

## 2020-11-10 NOTE — XMS REPORT
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 in both kidneys, including a 2.5 cm cyst in the left renal inferior pole. 
 No solid mass lesions.  No stones.      GI TRACT: No abnormal distention, wall 
thickening, or evidence of bowel   obstruction.       Appendix is normal.      
PELVIC ORGANS/BLADDER: Urinary bladder is underdistended with mild   
circumferential urinary bladder wall thickening. Soft tissue contiguity between 
 the rectum and lower bladder. The prostate has been removed.      LYMPH NODES: 
No lymphadenopathy.      VESSELS:     Arterial calcifications.      PERITONEUM /
RETROPERITONEUM: No free air or fluid.      BONES: Unremarkable.      SOFT 
TISSUES: Unremarkable.                  IMPRESSION:       Subtle findings 
suggestive of urinary bladder cystitis. Correlate with   urinalysis. Soft tissue
contiguity between the rectum and lower bladder, can be   due to surgery, 
although if there is recurrent episodes of cystitis, consider   rectovesicular 
fistula.   Bilateral renal cysts, largest is a 2.5 cm cyst in the left renal 
inferior   pole.      Signed by: Cy Mcghee DO on 11/10/2020 1:29 AM       
Dictated By: CY MCGHEE DO  Electronically Signed By: CY MCGHEE DO 
on 11/10/20 0129  Transcribed By: CATARINO on 11/10/20 0129       COPY TO:   
CHYNA KEVIN MD                                     

 

                CT ELBOW RIGHT W 2019 13:21:00                            

                              

                                            [place] [address]   
  Patient Name: ABE JEREZ                                   MR #: 
[id number]                     : 1950                                  
Age/Sex: 69/M  Acct #: [id number]                              Req #: [id number]
[id number]  Adm Physician: PAN ORTIZ MD                                      
Ordered by: RANDA POWELL NP                            Report #: [id number]   
    Location: MED/SURG                                Room/Bed: [facility]           
   
________________________________________________________________________________

___________________    Procedure: [id number] CT/CT ELBOW RIGHT W  Exam Date: 
19                            Exam Time: 1235                             
                REPORT STATUS: Signed    TECHNIQUE:   Continued tomography 
imaging of the RIGHT ELBOW was performed with injected   contrast. Dose 
modulation, iterative reconstruction, and/or weight based   adjustment of the 
mA/kV was utilized to reduce the radiation dose to as low as   reasonably a
chievable.       HISTORY:  Pain and swelling      COMPARISON: None available.   
  FINDINGS:      Skin thickening and subcutaneous edema around the posterior 
aspect of the   elbow.      No abscess.      No significant joint effusion.     
No acute fracture or cortical erosion to suggest osteomyelitis. Scattered   
calcifications along the common flexor origin and triceps insertion.      
IMPRESSION:      Cellulitis around the posterior elbow      Signed by: Dr. Andrew Palisch, M.D. on 2019 1:26 PM        Dictated By: ANDREW R PALISCH MD  Electronically Signed By: ANDREW R PALISCH MD on 19 1326  Transcribed 
By: CATARINO on 19       COPY TO:   RANDA POWELL NP                 

                    

 

                ELBOW RIGHT COMPLETE 2019 14:50:00                        

                              

                                                [place] [address]      Patient Name: ABE JEREZ                                  
MR #: [id number]                     : 1950                            
      Age/Sex: 69/M  Acct #: [id number]                              Req #: 
[id number]  Adm Physician:                                                      
Ordered by: DEMETRIA STEINBERG NP                            Report #: [id number] 
      Location: ER                                      Room/Bed:               
     
________________________________________________________________________________

___________________    Procedure: [id number] DX/ELBOW RIGHT COMPLETE  Exam Date: 
19                            Exam Time: 1410                             
                REPORT STATUS: Signed    ELBOW RIGHT COMPLETE - 3 views      
HISTORY:  Pain. Cellulitis.   COMPARISON: None available.           FINDINGS:   
Bones:   No acute displaced fracture.     Osseous alignment is within normal 
limits.      Joints:   The joint spaces are well-maintained.      Soft tissues: 
 Soft tissue swelling.         IMPRESSION:    No acute osseous abnormality..    
 Signed by: Dr. LUIS ARMANDO Chahal M.D. on 2019 2:51 PM        Dictated 
By: ANIRUDH CHAHAL MD, MD  Electronically Signed By: ANIRUDH CHAHAL MD, MD on 
19 1451  Transcribed By: CATARINO on 19 1451       COPY TO:   
DEMETRIA STEINBERG NP                                     

 

           CHEM PANEL 2019-10-24 22:43:07            117                   [facility]

 

           CHEM PANEL 2019-10-24 22:43:07            26                    [facility]

 

           CHEM PANEL 2019-10-24 22:43:07            1.20                  [facility]

 

           CHEM PANEL 2019-10-24 22:43:07            138                   [facility]

 

           CHEM PANEL 2019-10-24 22:43:07            4.0                   [facility]

 

           CHEM PANEL 2019-10-24 22:43:07            104                   [facility]

 

           CHEM PANEL 2019-10-24 22:43:07            22                    [facility]

 

           CHEM PANEL 2019-10-24 22:43:07            9.6                   [facility]

 

           CHEM PANEL 2019-10-24 22:43:07            62                    [facility]

 

           CHEM PANEL 2019-10-24 22:43:07            16.0                  [facility]

 

           HEMATOLOGY 2019-10-24 22:43:07            5.9                   [facility]

 

           HEMATOLOGY 2019-10-24 22:43:07            4.34                  [facility]

 

           HEMATOLOGY 2019-10-24 22:43:07            11.6                  [facility]

 

           HEMATOLOGY 2019-10-24 22:43:07            36.6                  [facility]

[facility]

 

           HEMATOLOGY 2019-10-24 22:43:07            84.5                  [facility]

[facility]

 

                    HEMATOLOGY          2019-10-24 22:43:07   

 

                                        Test Item

 

             MCH (test code = MCH) 26.8 pg      27.0-31.0                  





[facility]elwveoJDOFKUILSY2058-57-34 22[facility]43[facility]0731.7[facility]
2019-10-24 22:43:0722.0[facility] IhyrxepIIRUJYCEGM4338-34-19 22:43:28341Yjdcvzex 
SgqfrevNKJJIHHTYF9056-33-44 22:43:078.4[facility]2019-10-24 
22:43:07* 



             Test Item    Value        Reference Range Interpretation Comments

 

             INR (test code = INR) 0.94 1       0.85-1.17                  





[facility] 



             Test Item    Value        Reference Range Interpretation Comments

 

             PT (test code = PT) 12.4 s       12.0-14.7                  





[facility]43:07[facility] 



             Test Item    Value        Reference Range Interpretation Comments

 

             PTT (test code = PTT) 32.1 s       22.9-35.8                  





[facility] 22:43:07* 



             Test Item    Value        Reference Range Interpretation Comments

 

             ACT (TEG) Rapid (test code = ACT (TEG) Rapid) 74 s           

                   





[facility] 22:43:07* 



             Test Item    Value        Reference Range Interpretation Comments

 

             [place] Rapid (test code = [place] Rapid) 0.2 min          

                       





[facility] 22:43:07* 



             Test Item    Value        Reference Range Interpretation Comments

 

             R-time Rapid (test code = R-time Rapid) 0.2 min      0.4-0.7       

             





[facility]2019-10-24 22:43:07* 



             Test Item    Value        Reference Range Interpretation Comments

 

             K-time Rapid (test code = K-time Rapid) 0.8 min      0.6-2.3       

             





[facility]2019-10[facility]24 22:43:07* 



             Test Item    Value        Reference Range Interpretation Comments

 

             Angle Rapid (test code = Angle Rapid) 81 degrees   64-80           

           





[facility]2019-10-24 22:43:07* 



             Test Item    Value        Reference Range Interpretation Comments

 

             Max Amplitude Rapid (test code = Max Amplitude Rapid) 79 mm        

52-71                      





[facility] ScecrfqAGQOCJPIVO6291-04-13 22:43:0718.5[facility]
2019-10-24 22:43:070.2[facility] HorhwqjTNNHPJRZDN9360-19-34 22:43:0765.9[facility] 
BqwevzySHPWLREIJN7783-14-68 22:43:0718.6[facility] DohnqurMWMNCXOKMT6247-66-53 
22:43:078.8[facility] XwlxeloJFMUOPHORE7720-41-13 22:43:076.1[facility]
KIHSGZEKWT7258-39-69 22:43:070.6[facility] RgnthadFOAMCAKLHX6652-16-86 22:43:073.9
[facility] ShgijwrQRKNQHMSJT9230-23-82 22:43:071.1[facility]
2019-10-24 22:43:070.5[facility] VrijwdpWWWKIQVJTT5457-77-76 22:43:070.4[facility] 
DbasqgkLBZMBZJTUR5685-60-62 22:43:071+ *ABN*(10/24/19 5:43 PM)[facility]
CHEST SINGLE (PORTABLE)2019-10-23 12:45:00                                      
                                               [place] [address]
     Patient Name: ABE JEREZ                                   MR #: 
[id number]                     : 1950                                  
Age/Sex: 69/M  Acct #: [id number]                              Req #: [id number]
[id number]  Adm Physician:                                                      
Ordered by: GREGORY CELESTE MD, MD                            Report #: [id number]
       Location: ER                                      Room/Bed:              
      ___________________________________________
________________________________________________________    Procedure: [id number]
DX/CHEST SINGLE (PORTABLE)  Exam Date: 10/23/19                            Exam 
Time: 1215                                              REPORT STATUS: Signed   
Chest, 1 view,  10/23/2019.             History: Hypertension, dizziness.      
Comparison: None available.      Findings: The cardiomediastinal silhouette and 
pulmonary vasculature are within   normal limits for a portable exam. There is 
no focal consolidation or pleural   effusion. Mild degenerative changes are pres
ent in the shoulders. There are no   acute osseous or soft tissue abnormalities.
      Impression:    No acute cardiopulmonary abnormality.      Signed by: Magda Marshall on 10/23/2019 12:45 PM        Dictated By: MATT hankins Signed By: MATT MARSHALL MD on 10/23/19 1245  Transcribed By: CATARINO on  1245       COPY TO:   GREGORY CELESTE         CT BRAIN [id number] 
11:36:00                                                                        
             [place] [address]      Patient Name: 
ABE JEREZ                                   MR #: [id number]              
      : 1950                                   Age/Sex: 69/M  Acct #: 
[id number]                              Req #: [id number]  Adm Physician:     
                                                Ordered by: MATT WASHBURN NP 
                          Report #: [id number]        Location: ER               
                      Room/Bed:                     
_____________________________________________
______________________________________________________    Procedure: [id number] C
T/CT BRAIN WO  Exam Date: 10/23/19                            Exam Time: 1112   
                                          REPORT STATUS: Signed    CT BRAIN WO  
   HISTORY: High blood pressure      COMPARISON:  None.      TECHNIQUE:    Non
contrast axial scans were obtained from skull base to the vertex.  Coronal   and
sagittal reconstructions obtained from the axial data.  One or more of the   [facility] dose reduction techniques were used: Automated exposure control,   adjus
tment of the mA and/or kV according to patient size, and/or utilization of   ite
rative reconstruction technique.      DISCUSSION:      Scalp/Skull: Unremarkable
.   Brain sulci: Appropriate for patient's age.   Ventricles: Normal in size and
configuration.  No hydrocephalus.   Extra-axial spaces: No masses or fluid margot
ections.  Carotid siphon and   vertebral artery calcifications are present.     
Parenchyma:    Mild periventricular white matter hypodensities are likely chron
ic   microvascular ischemic changes.   There is an old small lacunar infarct in 
the right caudate head.   Otherwise, no mass, hemorrhage, or large vascular terr
itory acute infarct.      Dural sinuses:  No abnormal densities.   Sellar/Supras
ellar region: Intact.   Skull base: Intact.   Incidental findings: None.      IM
PRESSION:      1.  No acute intracranial abnormalities.   2.  Mild supratentoria
l chronic microvascular ischemic change.   3.  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
rick infarct.         Signed by: Dr. Taye Garcia M.D. on 10/23/2019 11:50 AM 
      Dictated By: TAYE GARCIA MD  Electronically Signed By: TAYE BOB MPA, MD on 10/23/19 1150  Transcribed By: CATARINO on 10/23/19 1150       COPY TO:
  MATT WASHBURN NP         BODY FLUID CULTURE + GRAM STAIN9-10-04 11:16:00
  * 



             Test Item    Value        Reference Range Interpretation Comments

 

             CULTURE (BEAKER) (test code = 1095) STAPHYLOCOCCUS AUREUS          

    A            4+ Staphylococcus 

aureus

 

             Clindamycin (test code = 10)                           R           

  

 

             Erythromycin (test code = 4)                           R           

  

 

             Linezolid (test code = 40)                           S             

 

             Nitrofurantoin (test code = 23)                           S        

     

 

             Oxacillin (test code = 14)                           S             

 

             Rifampin (test code = 43)                           S             

 

             Tetracycline (test code = 2)                           S           

  

 

             Trimethoprim + Sulfamethoxazole (test code = 47)                   

        S             

 

             Vancomycin (test code = 13)                           S            

 

 

             CULTURE (BEAKER) (test code = 1095) STAPHYLOCOCCUS AUREUS          

    A            4+ Staphylococcus 

aureusof a second type

 

             Clindamycin (test code = 10)                           R           

  

 

             Erythromycin (test code = 4)                           R           

  

 

             Linezolid (test code = 40)                           S             

 

             Nitrofurantoin (test code = 23)                           S        

     

 

             Oxacillin (test code = 14)                           S             

 

             Rifampin (test code = 43)                           S             

 

             Tetracycline (test code = 2)                           S           

  

 

             Trimethoprim + Sulfamethoxazole (test code = 47)                   

        S             

 

             Vancomycin (test code = 13)                           S            

 

 

             GRAM STAIN RESULT (BEAKER) (test code = 1123) 3+ White blood cells 

seen                            

 

             GRAM STAIN RESULT (BEAKER) (test code = 268617) No organisms seen  

                          





POCT-GLUCOSE METER2019-10-03 12:19:00* 



             Test Item    Value        Reference Range Interpretation Comments

 

             POC-GLUCOSE METER (BEAKER) (test code = 1538) 286 mg/dL      

     H            TESTED AT [address] [address]





POCT-GLUCOSE METER2019-10-03 07:47:00* 



             Test Item    Value        Reference Range Interpretation Comments

 

             POC-GLUCOSE METER (BEAKER) (test code = 1538) 169 mg/dL      

     H            TESTED AT [address] [address]





BASIC METABOLIC PANEL2019-10-03 05:34:00* 



             Test Item    Value        Reference Range Interpretation Comments

 

             SODIUM (BEAKER) (test code = 381) 139 meq/L    136-145             

       

 

             POTASSIUM (BEAKER) (test code = 379) 4.3 meq/L    3.5-5.1          

          

 

             CHLORIDE (BEAKER) (test code = 382) 103 meq/L                

         

 

             CO2 (BEAKER) (test code = 355) 29 meq/L     22-29                  

    

 

             BLOOD UREA NITROGEN (BEAKER) (test code = 354) 10 mg/dL     7-21   

                    

 

             CREATININE (BEAKER) (test code = 358) 0.96 mg/dL   0.57-1.25       

           

 

             GLUCOSE RANDOM (BEAKER) (test code = 652) 159 mg/dL          

 H             

 

             CALCIUM (BEAKER) (test code = 697) 9.0 mg/dL    8.4-10.2           

        

 

             EGFR (BEAKER) (test code = 1092) 78 mL/min/1.73 sq m               

            ESTIMATED GFR IS NOT AS

ACCURATE AS CREATININE CLEARANCE IN PREDICTING GLOMERULAR FILTRATION RATE. 
ESTIMATED GFR IS NOT APPLICABLE FOR DIALYSIS PATIENTS.





CBC W/PLT COUNT & AUTO DIFFERENTIAL2019-10-03 04:46:00* 



             Test Item    Value        Reference Range Interpretation Comments

 

             WHITE BLOOD CELL COUNT (BEAKER) (test code = 775) 5.6 K/ L     3.5-

10.5                   

 

             RED BLOOD CELL COUNT (BEAKER) (test code = 761) 3.24 M/ L    4.63-6

.08    L             

 

             HEMOGLOBIN (BEAKER) (test code = 410) 8.2 GM/DL    13.7-17.5    L  

           

 

             HEMATOCRIT (BEAKER) (test code = 411) 28.1 %       40.1-51.0    L  

           

 

             MEAN CORPUSCULAR VOLUME (BEAKER) (test code = 753) 86.7 fL      79.

0-92.2                  

 

             MEAN CORPUSCULAR HEMOGLOBIN (BEAKER) (test code = 751) 25.3 pg     

 25.7-32.2    L             

 

                    MEAN CORPUSCULAR HEMOGLOBIN CONC (BEAKER) (test code = 752) 

29.2 GM/DL          32.3-36.5

                          L                          

 

             RED CELL DISTRIBUTION WIDTH (BEAKER) (test code = 412) 17.2 %      

 11.6-14.4    H             

 

             PLATELET COUNT (BEAKER) (test code = 756) 423 K/CU MM  150-450     

               

 

             MEAN PLATELET VOLUME (BEAKER) (test code = 754) 9.5 fL       9.4-12

.4                   

 

             NUCLEATED RED BLOOD CELLS (BEAKER) (test code = 413) 0 /100 WBC   0

-0                        

 

             NEUTROPHILS RELATIVE PERCENT (BEAKER) (test code = 429) 61 %       

                             

 

             LYMPHOCYTES RELATIVE PERCENT (BEAKER) (test code = 430) 20 %       

                             

 

             MONOCYTES RELATIVE PERCENT (BEAKER) (test code = 431) 11 %         

                           

 

             EOSINOPHILS RELATIVE PERCENT (BEAKER) (test code = 432) 6 %        

                             

 

             BASOPHILS RELATIVE PERCENT (BEAKER) (test code = 437) 0 %          

                           

 

             NEUTROPHILS ABSOLUTE COUNT (BEAKER) (test code = 670) 3.40 K/ L    

1.78-5.38                  

 

             LYMPHOCYTES ABSOLUTE COUNT (BEAKER) (test code = 414) 1.08 K/ L    

1.32-3.57    L             

 

             MONOCYTES ABSOLUTE COUNT (BEAKER) (test code = 415) 0.59 K/ L    0.

30-0.82                  

 

             EOSINOPHILS ABSOLUTE COUNT (BEAKER) (test code = 416) 0.35 K/ L    

0.04-0.54                  

 

             BASOPHILS ABSOLUTE COUNT (BEAKER) (test code = 417) 0.02 K/ L    0.

01-0.08                  

 

             IMMATURE GRANULOCYTES-RELATIVE PERCENT (BEAKER) (test code = 2801) 

2 %          0-1          H             





BLOOD CULTURE2019-10-03 02:01:00* 



             Test Item    Value        Reference Range Interpretation Comments

 

             CULTURE (BEAKER) (test code = 1095) No growth in 5 days            

                





BLOOD CULTURE2019-10-03 02:01:00* 



             Test Item    Value        Reference Range Interpretation Comments

 

             CULTURE (BEAKER) (test code = 1095) No growth in 5 days            

                





POCT-GLUCOSE METER2019-10-02 21:13:00* 



             Test Item    Value        Reference Range Interpretation Comments

 

             POC-GLUCOSE METER (BEAKER) (test code = 1538) 184 mg/dL      

     H            TESTED AT [address] [address]





POCT-GLUCOSE METER2019-10-02 17:46:00* 



             Test Item    Value        Reference Range Interpretation Comments

 

             POC-GLUCOSE METER (BEAKER) (test code = 1538) 161 mg/dL      

     H            TESTED AT [facility]

[address] [facility] [address]





POCT-GLUCOSE METER2019-10-02 11:21:00* 



             Test Item    Value        Reference Range Interpretation Comments

 

             POC-GLUCOSE METER (BEAKER) (test code = 1538) 265 mg/dL      

     H            TESTED AT [address] [address]





POCT-GLUCOSE METER2019-10-02 08:11:00* 



             Test Item    Value        Reference Range Interpretation Comments

 

             POC-GLUCOSE METER (BEAKER) (test code = 1538) 204 mg/dL      

     H            TESTED AT [address] [address]





POCT-GLUCOSE METER2019-10-01 20:54:00* 



             Test Item    Value        Reference Range Interpretation Comments

 

             POC-GLUCOSE METER (BEAKER) (test code = 1538) 176 mg/dL      

     H            TESTED AT [place] [id number] [facility] [address]





POCT-GLUCOSE METER2019-10-01 17:30:00* 



             Test Item    Value        Reference Range Interpretation Comments

 

             POC-GLUCOSE METER (BEAKER) (test code = 1538) 219 mg/dL      

     H            TESTED AT [facility]

[address] [facility] [address]





POCT-GLUCOSE METER2019-10-01 11:59:00* 



             Test Item    Value        Reference Range Interpretation Comments

 

             POC-GLUCOSE METER (BEAKER) (test code = 1538) 307 mg/dL      

     H            Notified RN 

MD/TESTED AT [place] [id number] [facility] [address]





POCT-GLUCOSE METER2019-10-01 07:47:00* 



             Test Item    Value        Reference Range Interpretation Comments

 

             POC-GLUCOSE METER (BEAKER) (test code = 1538) 169 mg/dL      

     H            TESTED AT [facility]

[address] [facility] [address]





CBC (HEMOGRAM ONLY)2019-10-01 03:18:00* 



             Test Item    Value        Reference Range Interpretation Comments

 

             WHITE BLOOD CELL COUNT (BEAKER) (test code = 775) 7.2 K/ L     3.5-

10.5                   

 

             RED BLOOD CELL COUNT (BEAKER) (test code = 761) 3.34 M/ L    4.63-6

.08    L             

 

             HEMOGLOBIN (BEAKER) (test code = 410) 8.2 GM/DL    13.7-17.5    L  

           

 

             HEMATOCRIT (BEAKER) (test code = 411) 28.2 %       40.1-51.0    L  

           

 

             MEAN CORPUSCULAR VOLUME (BEAKER) (test code = 753) 84.4 fL      79.

0-92.2                  

 

             MEAN CORPUSCULAR HEMOGLOBIN (BEAKER) (test code = 751) 24.6 pg     

 25.7-32.2    L             

 

                    MEAN CORPUSCULAR HEMOGLOBIN CONC (BEAKER) (test code = 752) 

29.1 GM/DL          32.3-36.5

                          L                          

 

             RED CELL DISTRIBUTION WIDTH (BEAKER) (test code = 412) 17.1 %      

 11.6-14.4    H             

 

             PLATELET COUNT (BEAKER) (test code = 756) 433 K/CU MM  150-450     

               

 

             MEAN PLATELET VOLUME (BEAKER) (test code = 754) 9.0 fL       9.4-12

.4     L             

 

             NUCLEATED RED BLOOD CELLS (BEAKER) (test code = 413) 0 /100 WBC   0

-0                        





POCT-GLUCOSE [id number] 21:19:00* 



             Test Item    Value        Reference Range Interpretation Comments

 

             POC-GLUCOSE METER (BEAKER) (test code = 1538) 219 mg/dL      

     H            TESTED AT [facility]

[address] [facility] [address]





BODY FLUID CELL COUNT WITH [id number] 18:24:00* 



             Test Item    Value        Reference Range Interpretation Comments

 

             APPEARANCE FLUID (BEAKER) (test code = 510) Turbid       Clear     

   A             

 

             COLOR FLUID (BEAKER) (test code = 511) Red          Colorless, Stra

w A             

 

             RBC FLUID (BEAKER) (test code = 513) 125147 /cu mm <=1          H  

           

 

             ADJUSTED WBC FLUID (BEAKER) (test code = 1691) 17601 /cu mm <=5    

      H             

 

             LINING CELLS (BEAKER) (test code = 1590) 47046 /cu mm <=1          

H             

 

             NEUTROPHILS FLUID (BEAKER) (test code = 1656) 52 %                 

                   

 

             LYMPHS FLUID (BEAKER) (test code = 488) 35 %                       

             

 

             MONO/MACROPHAGE FLUID (BEAKER) (test code = 489) 6 %               

                      

 

             EOSINOPHILS FLUID (BEAKER) (test code = 491) 0 %                   

                  

 

             BASO FLUID (BEAKER) (test code = 492) 0 %                          

           

 

             CONTAINER BODY FLUID (BEAKER) (test code = 2873) EDTA Tube         

                      





POCT-GLUCOSE [id number] 17:44:00* 



             Test Item    Value        Reference Range Interpretation Comments

 

             POC-GLUCOSE METER (BEAKER) (test code = 1538) 223 mg/dL      

     H            TESTED AT [place] [id number] [facility] [address]





CBC W/PLT COUNT & AUTO [id number] 17:12:00* 



             Test Item    Value        Reference Range Interpretation Comments

 

             WHITE BLOOD CELL COUNT (BEAKER) (test code = 775) 7.6 K/ L     3.5-

10.5                   

 

             RED BLOOD CELL COUNT (BEAKER) (test code = 761) 3.20 M/ L    4.63-6

.08    L             

 

             HEMOGLOBIN (BEAKER) (test code = 410) 8.0 GM/DL    13.7-17.5    L  

           

 

             HEMATOCRIT (BEAKER) (test code = 411) 27.4 %       40.1-51.0    L  

           

 

             MEAN CORPUSCULAR VOLUME (BEAKER) (test code = 753) 85.6 fL      79.

0-92.2                  

 

             MEAN CORPUSCULAR HEMOGLOBIN (BEAKER) (test code = 751) 25.0 pg     

 25.7-32.2    L             

 

                    MEAN CORPUSCULAR HEMOGLOBIN CONC (BEAKER) (test code = 752) 

29.2 GM/DL          32.3-36.5

                          L                          

 

             RED CELL DISTRIBUTION WIDTH (BEAKER) (test code = 412) 17.1 %      

 11.6-14.4    H             

 

             PLATELET COUNT (BEAKER) (test code = 756) 449 K/CU MM  150-450     

               

 

             MEAN PLATELET VOLUME (BEAKER) (test code = 754) 9.4 fL       9.4-12

.4                   

 

             NUCLEATED RED BLOOD CELLS (BEAKER) (test code = 413) 0 /100 WBC   0

-0                        

 

             NEUTROPHILS RELATIVE PERCENT (BEAKER) (test code = 429) 72 %       

                             

 

             LYMPHOCYTES RELATIVE PERCENT (BEAKER) (test code = 430) 14 %       

                             

 

             MONOCYTES RELATIVE PERCENT (BEAKER) (test code = 431) 9 %          

                           

 

             EOSINOPHILS RELATIVE PERCENT (BEAKER) (test code = 432) 4 %        

                             

 

             BASOPHILS RELATIVE PERCENT (BEAKER) (test code = 437) 0 %          

                           

 

             NEUTROPHILS ABSOLUTE COUNT (BEAKER) (test code = 670) 5.40 K/ L    

1.78-5.38    H             

 

             LYMPHOCYTES ABSOLUTE COUNT (BEAKER) (test code = 414) 1.06 K/ L    

1.32-3.57    L             

 

             MONOCYTES ABSOLUTE COUNT (BEAKER) (test code = 415) 0.70 K/ L    0.

30-0.82                  

 

             EOSINOPHILS ABSOLUTE COUNT (BEAKER) (test code = 416) 0.29 K/ L    

0.04-0.54                  

 

             BASOPHILS ABSOLUTE COUNT (BEAKER) (test code = 417) 0.02 K/ L    0.

01-0.08                  

 

             IMMATURE GRANULOCYTES-RELATIVE PERCENT (BEAKER) (test code = 2801) 

1 %          0-1                        





[id number] 13:11:00* 



             Test Item    Value        Reference Range Interpretation Comments

 

             PARTIAL THROMBOPLASTIN TIME (BEAKER) (test code = 760) 86.2 seconds

 22.5-36.0    H            







POCT-GLUCOSE [id number] 11:55:00* 



             Test Item    Value        Reference Range Interpretation Comments

 

             POC-GLUCOSE METER (BEAKER) (test code = 1538) 261 mg/dL      

     H            TESTED AT [facility]

[address] [facility] [address]





POCT-GLUCOSE [id number] 07:44:00* 



             Test Item    Value        Reference Range Interpretation Comments

 

             POC-GLUCOSE METER (BEAKER) (test code = 1538) 175 mg/dL      

     H            TESTED AT [place] [id number] [facility] [address]





BASIC METABOLIC [id number] 05:43:00* 



             Test Item    Value        Reference Range Interpretation Comments

 

             SODIUM (BEAKER) (test code = 381) 134 meq/L    136-145      L      

       

 

             POTASSIUM (BEAKER) (test code = 379) 3.8 meq/L    3.5-5.1          

          

 

             CHLORIDE (BEAKER) (test code = 382) 101 meq/L                

         

 

             CO2 (BEAKER) (test code = 355) 25 meq/L     22-29                  

    

 

             BLOOD UREA NITROGEN (BEAKER) (test code = 354) 13 mg/dL     7-21   

                    

 

             CREATININE (BEAKER) (test code = 358) 0.96 mg/dL   0.57-1.25       

           

 

             GLUCOSE RANDOM (BEAKER) (test code = 652) 173 mg/dL          

 H             

 

             CALCIUM (BEAKER) (test code = 697) 8.6 mg/dL    8.4-10.2           

        

 

             EGFR (BEAKER) (test code = 1092) 78 mL/min/1.73 sq m               

            ESTIMATED GFR IS NOT AS

ACCURATE AS CREATININE CLEARANCE IN PREDICTING GLOMERULAR FILTRATION RATE. 
ESTIMATED GFR IS NOT APPLICABLE FOR DIALYSIS PATIENTS.





[id number] 05:22:00* 



             Test Item    Value        Reference Range Interpretation Comments

 

             PARTIAL THROMBOPLASTIN TIME (BEAKER) (test code = 760) 63.2 seconds

 22.5-36.0    H            







[id number] 21:35:00* 



             Test Item    Value        Reference Range Interpretation Comments

 

             PARTIAL THROMBOPLASTIN TIME (BEAKER) (test code = 760) 70.7 seconds

 22.5-36.0    H            







POCT-GLUCOSE [id number] 21:02:00* 



             Test Item    Value        Reference Range Interpretation Comments

 

             POC-GLUCOSE METER (BEAKER) (test code = 1538) 238 mg/dL      

     H            TESTED AT [place] [id number] [facility] [address]





POCT-GLUCOSE [id number] 16:28:00* 



             Test Item    Value        Reference Range Interpretation Comments

 

             POC-GLUCOSE METER (BEAKER) (test code = 1538) 193 mg/dL      

     H            TESTED AT [place] [id number] [facility] [address]





POCT-GLUCOSE [id number] 13:01:00* 



             Test Item    Value        Reference Range Interpretation Comments

 

             POC-GLUCOSE METER (BEAKER) (test code = 1538) 292 mg/dL      

     H            TESTED AT [facility]

[address] [facility] [address]





VANCOMYCIN LEVEL, [id number] 11:02:00* 



             Test Item    Value        Reference Range Interpretation Comments

 

             VANCOMYCIN TROUGH (BEAKER) (test code = 522) 8.3 ug/mL    10.0-20.0

    L             





[id number] 10:46:00* 



             Test Item    Value        Reference Range Interpretation Comments

 

             PARTIAL THROMBOPLASTIN TIME (BEAKER) (test code = 760) 56.5 seconds

 22.5-36.0    H            







POCT-GLUCOSE [id number] 08:01:00* 



             Test Item    Value        Reference Range Interpretation Comments

 

             POC-GLUCOSE METER (BEAKER) (test code = 1538) 156 mg/dL      

     H            TESTED AT [facility]

[address] [facility] [address]





BASIC METABOLIC [id number] 06:57:00* 



             Test Item    Value        Reference Range Interpretation Comments

 

             SODIUM (BEAKER) (test code = 381) 132 meq/L    136-145      L      

       

 

             POTASSIUM (BEAKER) (test code = 379) 3.7 meq/L    3.5-5.1          

          

 

             CHLORIDE (BEAKER) (test code = 382) 99 meq/L                 

         

 

             CO2 (BEAKER) (test code = 355) 25 meq/L     22-29                  

    

 

             BLOOD UREA NITROGEN (BEAKER) (test code = 354) 14 mg/dL     7-21   

                    

 

             CREATININE (BEAKER) (test code = 358) 0.96 mg/dL   0.57-1.25       

           

 

             GLUCOSE RANDOM (BEAKER) (test code = 652) 138 mg/dL          

 H             

 

             CALCIUM (BEAKER) (test code = 697) 8.6 mg/dL    8.4-10.2           

        

 

             EGFR (BEAKER) (test code = 1092) 78 mL/min/1.73 sq m               

            ESTIMATED GFR IS NOT AS

ACCURATE AS CREATININE CLEARANCE IN PREDICTING GLOMERULAR FILTRATION RATE. 
ESTIMATED GFR IS NOT APPLICABLE FOR DIALYSIS PATIENTS.





[id number] 06:22:00* 



             Test Item    Value        Reference Range Interpretation Comments

 

             PARTIAL THROMBOPLASTIN TIME (BEAKER) (test code = 760) 73.1 seconds

 22.5-36.0    H            







[id number] 23:09:00* 



             Test Item    Value        Reference Range Interpretation Comments

 

             PARTIAL THROMBOPLASTIN TIME (BEAKER) (test code = 760) 54.9 seconds

 22.5-36.0    H            







CT, DRAINAGE, [id number] 21:32:00Reason for exam:->right ileopsoas abscess
FINAL REPORT PATIENT ID:   [id number] PROCEDURE: CT-guided drainage of a right jannet
opsoas abscess. Dose modulation, iterative reconstruction, and/or weight-based a
djustment of the mA/kV was utilized to reduce the radiation dose to as low as re
asonably achievable. INDICATION: Right iliopsoas abscess. COMPARISON: Recent out
side facility CT. SEDATION: Intravenous moderate sedation was administered by ra
[facility] and monitored under the direction of the undersigned radiologist
. The patient's vital signs were monitored throughout the procedure and recorded
in the patient's medical record by radiology nursing. Total intraservice time of
sedation was 25 minutes. MEDICATIONS: 1 mg Versed, 50 mcg fentanyl DESCRIPTION: 
After obtaining informed written consent, the patient was brought to the [facility] room and placed in the supine position.  Preliminary CT scan revealed a rig
ht iliopsoas abscess. A clear path to the collection was identified. [facility]
 [facility] was prepped and draped in the usual, sterile fashion and local 2% lidoca
ine anesthesia was administered. Under CT guidance, and 19-gauge needle was intr
oduced into the right psoas fluid collection and a path to a void the location o
f the deep circumflex iliac artery. A 19-gauge needle was inserted into the flui
d collection and a wire was placed through the needle. After dilation with a 7 F
rench dilator, an 8 French catheter was inserted into the fluid collection. Appr
oximately 30 mL of perilymphatic fluid was aspirated. The catheter was affixed t
o the skin and connected to the suction bulb. Samples were sent for analysis. Po
st procedure scan showed no immediate complications. IMPRESSION: Successful plac
ement of an 8 French catheter into a right iliopsoas abscess. Signed: Alexsander Humphrieseport Verified Date/Time:  2019 21:32:44 Reading Location: [facility] B1 [id number]
CT Body Reading Room      Electronically signed by: ALEXSANDER HUMPHRIES MD on  09:32 PM POCT-GLUCOSE [id number] 21:16:00* 



             Test Item    Value        Reference Range Interpretation Comments

 

             POC-GLUCOSE METER (BEAKER) (test code = 1538) 182 mg/dL      

     H            TESTED AT [facility]

[address] [facility] [address]





POCT-GLUCOSE [id number] 17:42:00* 



             Test Item    Value        Reference Range Interpretation Comments

 

             POC-GLUCOSE METER (BEAKER) (test code = 1538) 157 mg/dL      

     H            TESTED AT [facility]

[address] [facility] [address]





[id number] 17:21:00* 



             Test Item    Value        Reference Range Interpretation Comments

 

             PARTIAL THROMBOPLASTIN TIME (BEAKER) (test code = 760) 45.9 seconds

 22.5-36.0    H            







POCT-GLUCOSE [id number] 10:53:00* 



             Test Item    Value        Reference Range Interpretation Comments

 

             POC-GLUCOSE METER (BEAKER) (test code = 1538) 128 mg/dL      

     H            TESTED AT [facility]

[address] [facility] [address]





BASIC METABOLIC [id number] 05:58:00* 



             Test Item    Value        Reference Range Interpretation Comments

 

             SODIUM (BEAKER) (test code = 381) 135 meq/L    136-145      L      

       

 

             POTASSIUM (BEAKER) (test code = 379) 3.6 meq/L    3.5-5.1          

          

 

             CHLORIDE (BEAKER) (test code = 382) 101 meq/L                

         

 

             CO2 (BEAKER) (test code = 355) 23 meq/L     22-29                  

    

 

             BLOOD UREA NITROGEN (BEAKER) (test code = 354) 11 mg/dL     7-21   

                    

 

             CREATININE (BEAKER) (test code = 358) 0.80 mg/dL   0.57-1.25       

           

 

             GLUCOSE RANDOM (BEAKER) (test code = 652) 126 mg/dL          

 H             

 

             CALCIUM (BEAKER) (test code = 697) 9.0 mg/dL    8.4-10.2           

        

 

             EGFR (BEAKER) (test code = 1092) 96 mL/min/1.73 sq m               

            ESTIMATED GFR IS NOT AS

ACCURATE AS CREATININE CLEARANCE IN PREDICTING GLOMERULAR FILTRATION RATE. 
ESTIMATED GFR IS NOT APPLICABLE FOR DIALYSIS PATIENTS.





[id number] 05:08:00* 



             Test Item    Value        Reference Range Interpretation Comments

 

             PARTIAL THROMBOPLASTIN TIME (BEAKER) (test code = 760) 48.7 seconds

 22.5-36.0    H            







POCT-GLUCOSE [id number] 21:27:00* 



             Test Item    Value        Reference Range Interpretation Comments

 

             POC-GLUCOSE METER (BEAKER) (test code = 1538) 225 mg/dL      

     H            TESTED AT [facility]

[address] [facility] [address]





[id number] 20:36:00* 



             Test Item    Value        Reference Range Interpretation Comments

 

             PHOSPHORUS (BEAKER) (test code = 604) 3.7 mg/dL    2.3-4.7         

           





[id number] 20:36:00* 



             Test Item    Value        Reference Range Interpretation Comments

 

             MAGNESIUM (BEAKER) (test code = 627) 1.7 mg/dL    1.6-2.6          

          





BASIC METABOLIC [id number] 20:36:00* 



             Test Item    Value        Reference Range Interpretation Comments

 

             SODIUM (BEAKER) (test code = 381) 133 meq/L    136-145      L      

       

 

             POTASSIUM (BEAKER) (test code = 379) 3.8 meq/L    3.5-5.1          

          

 

             CHLORIDE (BEAKER) (test code = 382) 100 meq/L                

         

 

             CO2 (BEAKER) (test code = 355) 22 meq/L     22-29                  

    

 

             BLOOD UREA NITROGEN (BEAKER) (test code = 354) 14 mg/dL     7-21   

                    

 

             CREATININE (BEAKER) (test code = 358) 0.84 mg/dL   0.57-1.25       

           

 

             GLUCOSE RANDOM (BEAKER) (test code = 652) 159 mg/dL          

 H             

 

             CALCIUM (BEAKER) (test code = 697) 9.2 mg/dL    8.4-10.2           

        

 

             EGFR (BEAKER) (test code = 1092) 91 mL/min/1.73 sq m               

            ESTIMATED GFR IS NOT AS

ACCURATE AS CREATININE CLEARANCE IN PREDICTING GLOMERULAR FILTRATION RATE. 
ESTIMATED GFR IS NOT APPLICABLE FOR DIALYSIS PATIENTS.





HEPATIC FUNCTION [id number] 20:36:00* 



             Test Item    Value        Reference Range Interpretation Comments

 

             TOTAL PROTEIN (BEAKER) (test code = 770) 8.6 gm/dL    6.0-8.3      

H             

 

             ALBUMIN (BEAKER) (test code = 1145) 3.0 g/dL     3.5-5.0      L    

         

 

             BILIRUBIN TOTAL (BEAKER) (test code = 377) 0.3 mg/dL    0.2-1.2    

                

 

             BILIRUBIN DIRECT (BEAKER) (test code = 706) 0.2 mg/dL    0.1-0.5   

                 

 

             ALKALINE PHOSPHATASE (BEAKER) (test code = 346) 105 U/L      

                     

 

             AST (SGOT) (BEAKER) (test code = 353) 18 U/L       5-34            

           

 

             ALT (SGPT) (BEAKER) (test code = 347) 20 U/L       6-55            

           





[id number] 20:28:00* 



             Test Item    Value        Reference Range Interpretation Comments

 

             PARTIAL THROMBOPLASTIN TIME (BEAKER) (test code = 760) 43.3 seconds

 22.5-36.0    H            







6 hours after starting heparin infusion and as indicated per sliding scale
PROTHROMBIN TIME/[id number] 20:27:00* 



             Test Item    Value        Reference Range Interpretation Comments

 

             PROTIME (BEAKER) (test code = 759) 15.4 seconds 11.9-14.2    H     

        

 

             INR (BEAKER) (test code = 370) 1.3          <=5.9                  

    





Effective 2019: PT Reference Range ChangeNew: 11.9-14.2  Previous: 11.7-14.
7RECOMMENDED COUMADIN/WARFARIN INR THERAPY RANGESSTANDARD DOSE: 2.0-3.0  Include
s: PROPHYLAXIS for venous thrombosis, systemic embolization; TREATMENT for venou
s thrombosis and/or pulmonary embolus.HIGH RISK: Target INR is 2.5-3.5 for patie
nts wiht mechanical heart valves.6 hours after starting heparin infusion and as 
indicated per sliding scaleCBC (HEMOGRAM ONLY)2019 20:20:00* 



             Test Item    Value        Reference Range Interpretation Comments

 

             WHITE BLOOD CELL COUNT (BEAKER) (test code = 775) 11.5 K/ L    3.5-

10.5     H             

 

             RED BLOOD CELL COUNT (BEAKER) (test code = 761) 3.52 M/ L    4.63-6

.08    L             

 

             HEMOGLOBIN (BEAKER) (test code = 410) 8.7 GM/DL    13.7-17.5    L  

           

 

             HEMATOCRIT (BEAKER) (test code = 411) 30.1 %       40.1-51.0    L  

           

 

             MEAN CORPUSCULAR VOLUME (BEAKER) (test code = 753) 85.5 fL      79.

0-92.2                  

 

             MEAN CORPUSCULAR HEMOGLOBIN (BEAKER) (test code = 751) 24.7 pg     

 25.7-32.2    L             

 

                    MEAN CORPUSCULAR HEMOGLOBIN CONC (BEAKER) (test code = 752) 

28.9 GM/DL          32.3-36.5

                          L                          

 

             RED CELL DISTRIBUTION WIDTH (BEAKER) (test code = 412) 17.3 %      

 11.6-14.4    H             

 

             PLATELET COUNT (BEAKER) (test code = 756) 450 K/CU MM  150-450     

               

 

             MEAN PLATELET VOLUME (BEAKER) (test code = 754) 9.3 fL       9.4-12

.4     L             

 

             NUCLEATED RED BLOOD CELLS (BEAKER) (test code = 413) 0 /100 WBC   0

-0                        





BODY FLUID CULTURE + GRAM [id number] 11:52:00* 



             Test Item    Value        Reference Range Interpretation Comments

 

             CULTURE (BEAKER) (test code = 1095)                           A    

        4+ Staphylococcus aureus

 

             GRAM STAIN RESULT (BEAKER) (test code = 1123) 4+ WBCs              

                   

 

                          GRAM STAIN RESULT (BEAKER) (test code = 41461) 4+ gram

 positive cocci in 

clusters                                                     





POCT-GLUCOSE [id number] 12:41:00* 



             Test Item    Value        Reference Range Interpretation Comments

 

             POC-GLUCOSE METER (BEAKER) (test code = 1538) 286 mg/dL      

     H            TESTED AT [facility]

[address] [facility] [address]





POCT-GLUCOSE [id number] 09:06:00* 



             Test Item    Value        Reference Range Interpretation Comments

 

             POC-GLUCOSE METER (BEAKER) (test code = 1538) 129 mg/dL      

     H            TESTED AT [address] [address]





CBC (HEMOGRAM ONLY)2019 06:39:00* 



             Test Item    Value        Reference Range Interpretation Comments

 

             WHITE BLOOD CELL COUNT (BEAKER) (test code = 775) 7.9 K/ L     3.5-

10.5                   

 

             RED BLOOD CELL COUNT (BEAKER) (test code = 761) 3.66 M/ L    4.63-6

.08    L             

 

             HEMOGLOBIN (BEAKER) (test code = 410) 9.7 GM/DL    13.7-17.5    L  

           

 

             HEMATOCRIT (BEAKER) (test code = 411) 32.0 %       40.1-51.0    L  

           

 

             MEAN CORPUSCULAR VOLUME (BEAKER) (test code = 753) 87.4 fL      79.

0-92.2                  

 

             MEAN CORPUSCULAR HEMOGLOBIN (BEAKER) (test code = 751) 26.5 pg     

 25.7-32.2                  

 

                    MEAN CORPUSCULAR HEMOGLOBIN CONC (BEAKER) (test code = 752) 

30.3 GM/DL          32.3-36.5

                          L                          

 

             RED CELL DISTRIBUTION WIDTH (BEAKER) (test code = 412) 16.3 %      

 11.6-14.4    H             

 

             PLATELET COUNT (BEAKER) (test code = 756) 411 K/CU MM  150-450     

               

 

             MEAN PLATELET VOLUME (BEAKER) (test code = 754) 10.1 fL      9.4-12

.4                   

 

             NUCLEATED RED BLOOD CELLS (BEAKER) (test code = 413) 0 /100 WBC   0

-0                        





BASIC METABOLIC [id number] 06:25:00* 



             Test Item    Value        Reference Range Interpretation Comments

 

             SODIUM (BEAKER) (test code = 381) 137 meq/L    136-145             

       

 

             POTASSIUM (BEAKER) (test code = 379) 3.9 meq/L    3.5-5.1          

         Specimen slightly 

hemolyzed

 

             CHLORIDE (BEAKER) (test code = 382) 103 meq/L                

         

 

             CO2 (BEAKER) (test code = 355) 21 meq/L     22-29        L         

    

 

             BLOOD UREA NITROGEN (BEAKER) (test code = 354) 12 mg/dL     7-21   

                    

 

             CREATININE (BEAKER) (test code = 358) 0.78 mg/dL   0.57-1.25       

          Specimen slightly 

hemolyzed

 

             GLUCOSE RANDOM (BEAKER) (test code = 652) 125 mg/dL          

 H             

 

             CALCIUM (BEAKER) (test code = 697) 9.0 mg/dL    8.4-10.2           

        

 

             EGFR (BEAKER) (test code = 1092) 99 mL/min/1.73 sq m               

            ESTIMATED GFR IS NOT AS

ACCURATE AS CREATININE CLEARANCE IN PREDICTING GLOMERULAR FILTRATION RATE. 
ESTIMATED GFR IS NOT APPLICABLE FOR DIALYSIS PATIENTS.





POCT-GLUCOSE [id number] 21:20:00* 



             Test Item    Value        Reference Range Interpretation Comments

 

             POC-GLUCOSE METER (BEAKER) (test code = 1538) 134 mg/dL      

     H            TESTED AT [place] [id number] [facility] [address]





BODY FLUID CELL COUNT WITH [id number] 18:42:00* 



             Test Item    Value        Reference Range Interpretation Comments

 

             APPEARANCE FLUID (BEAKER) (test code = 510) Purulent     Clear     

   A             

 

             COLOR FLUID (BEAKER) (test code = 511) Brown        Colorless, Stra

w A            TAN

 

             RBC FLUID (BEAKER) (test code = 513) 846456 /cu mm <=1          H  

           

 

             ADJUSTED WBC FLUID (BEAKER) (test code = 1691) 154111 /cu mm <=5   

       H             

 

             LINING CELLS (BEAKER) (test code = 1590) 0 /cu mm     <=1          

              

 

             NEUTROPHILS FLUID (BEAKER) (test code = 1656) 97 %                 

                   

 

             LYMPHS FLUID (BEAKER) (test code = 488) 2 %                        

             

 

             MONO/MACROPHAGE FLUID (BEAKER) (test code = 489) 1 %               

                      

 

             EOSINOPHILS FLUID (BEAKER) (test code = 491) 0 %                   

                  

 

             BASO FLUID (BEAKER) (test code = 492) 0 %                          

           

 

             CONTAINER BODY FLUID (BEAKER) (test code = 2873) EDTA Tube         

                      





Intracellular cocci present. Degenerated white cells present.CT, DRAINAGE, 
[id number] 18:06:00Drainage of right iliopsoas abcessFINAL REPORT PATIENT 
ID:   [id number] CT-guided drainage of right iliopsoas abscess. CLINICAL HISTORY: 
Drainage of right iliopsoas abcess. COMPARISON STUDY: CT scan dated 2019. Informed consent was obtained from the patient and the risks of the 
procedure were explained including bleeding, infection, damage to lung, bowel, 
blood vessels, nerves and other adjacent structures.  This exam was performed 
according to our department dose optimization program which includes automated 
exposure control, adjustment of the mA and/or kV according to the patient's size
and/or use of iterative reconstruction technique. Sedation: 1% Xylocaine was 
utilized as local analgesia. A total of 2.5 mg of Versed and 150 mcg of fentanyl
were administered using the moderate sedation protocol under the supervision of 
the physician and nurse. Moderate sedation time: 40 minutes. TECHNIQUE: Using 
sterile technique, CT fluoroscopic guidance and initially a 20-gauge introducer 
needle, the tip was inserted into the deep situated 5.0 x 2.7 cm right iliopsoas
collection. A 0.035 Moncada wire was inserted. However, an attempt to dilate a 
tract to the deep situated collection with a six and 7 French wire was un
successful as the dilators could not penetrate the thickened muscle and the wire
coiled. Therefore, using the trocar technique a 7 French pigtail catheter was i
nserted. Approximately 20 cc of pus was aspirated. The sample was submitted to t
 lab for analysis. COMPLICATIONS: None. ESTIMATED BLOOD LOSS: Minimal. Patient
Disposition: The patient was in the same state post procedure as preprocedure.  
IMPRESSION: Successful CT-guided pigtail catheter into a right iliopsoas absces
s. Signed: Jermaine Arvizueport Verified Date/Time:  2019 18:06:55 Flor watson Location: [address] Body Reading Room      Electronically signed by: 
JERMAINE ARVIZU M.D. on 2019 06:06 PM POCT-GLUCOSE [id number] 
17:34:00* 



             Test Item    Value        Reference Range Interpretation Comments

 

             POC-GLUCOSE METER (BEAKER) (test code = 1538) 113 mg/dL      

     H            TESTED AT [facility]

[address] [facility] [address]





BASIC METABOLIC [id number] 05:40:00* 



             Test Item    Value        Reference Range Interpretation Comments

 

             SODIUM (BEAKER) (test code = 381) 135 meq/L    136-145      L      

       

 

             POTASSIUM (BEAKER) (test code = 379) 3.7 meq/L    3.5-5.1          

          

 

             CHLORIDE (BEAKER) (test code = 382) 100 meq/L                

         

 

             CO2 (BEAKER) (test code = 355) 25 meq/L     22-29                  

    

 

             BLOOD UREA NITROGEN (BEAKER) (test code = 354) 15 mg/dL     7-21   

                    

 

             CREATININE (BEAKER) (test code = 358) 0.90 mg/dL   0.57-1.25       

           

 

             GLUCOSE RANDOM (BEAKER) (test code = 652) 138 mg/dL          

 H             

 

             CALCIUM (BEAKER) (test code = 697) 9.6 mg/dL    8.4-10.2           

        

 

             EGFR (BEAKER) (test code = 1092) 84 mL/min/1.73 sq m               

            ESTIMATED GFR IS NOT AS

ACCURATE AS CREATININE CLEARANCE IN PREDICTING GLOMERULAR FILTRATION RATE. 
ESTIMATED GFR IS NOT APPLICABLE FOR DIALYSIS PATIENTS.





CBC W/PLT COUNT & AUTO [id number] 04:41:00* 



             Test Item    Value        Reference Range Interpretation Comments

 

             WHITE BLOOD CELL COUNT (BEAKER) (test code = 775) 8.9 K/ L     3.5-

10.5                   

 

             RED BLOOD CELL COUNT (BEAKER) (test code = 761) 4.15 M/ L    4.63-6

.08    L             

 

             HEMOGLOBIN (BEAKER) (test code = 410) 10.6 GM/DL   13.7-17.5    L  

           

 

             HEMATOCRIT (BEAKER) (test code = 411) 36.0 %       40.1-51.0    L  

           

 

             MEAN CORPUSCULAR VOLUME (BEAKER) (test code = 753) 86.7 fL      79.

0-92.2                  

 

             MEAN CORPUSCULAR HEMOGLOBIN (BEAKER) (test code = 751) 25.5 pg     

 25.7-32.2    L             

 

                    MEAN CORPUSCULAR HEMOGLOBIN CONC (BEAKER) (test code = 752) 

29.4 GM/DL          32.3-36.5

                          L                          

 

             RED CELL DISTRIBUTION WIDTH (BEAKER) (test code = 412) 16.3 %      

 11.6-14.4    H             

 

             PLATELET COUNT (BEAKER) (test code = 756) 467 K/CU MM  150-450     

 H             

 

             MEAN PLATELET VOLUME (BEAKER) (test code = 754) 9.8 fL       9.4-12

.4                   

 

             NUCLEATED RED BLOOD CELLS (BEAKER) (test code = 413) 0 /100 WBC   0

-0                        

 

             NEUTROPHILS RELATIVE PERCENT (BEAKER) (test code = 429) 75 %       

                             

 

             LYMPHOCYTES RELATIVE PERCENT (BEAKER) (test code = 430) 13 %       

                             

 

             MONOCYTES RELATIVE PERCENT (BEAKER) (test code = 431) 9 %          

                           

 

             EOSINOPHILS RELATIVE PERCENT (BEAKER) (test code = 432) 2 %        

                             

 

             BASOPHILS RELATIVE PERCENT (BEAKER) (test code = 437) 0 %          

                           

 

             NEUTROPHILS ABSOLUTE COUNT (BEAKER) (test code = 670) 6.62 K/ L    

1.78-5.38    H             

 

             LYMPHOCYTES ABSOLUTE COUNT (BEAKER) (test code = 414) 1.13 K/ L    

1.32-3.57    L             

 

             MONOCYTES ABSOLUTE COUNT (BEAKER) (test code = 415) 0.84 K/ L    0.

30-0.82    H             

 

             EOSINOPHILS ABSOLUTE COUNT (BEAKER) (test code = 416) 0.19 K/ L    

0.04-0.54                  

 

             BASOPHILS ABSOLUTE COUNT (BEAKER) (test code = 417) 0.03 K/ L    0.

01-0.08                  

 

             IMMATURE GRANULOCYTES-RELATIVE PERCENT (BEAKER) (test code = 2801) 

1 %          0-1                        





CT, PELVIS, W [id number] 01:11:00Reason for exam:->LEG SWELLINGWhat is 
the patient's sedation requirement?->No SedationFINAL REPORT PATIENT ID:   
[id number] EXAM: CT pelvis with contrast CLINICAL HISTORY: Leg swelling and edema;
suspected proximal vein compromise.  TECHNIQUE: CT pelvis was performed with 
intravenous contrast administration.  This exam was performed according to our 
departmental dose optimization program which includes automated exposure 
control, adjustment of the mA and/or kV according to patient's size and/or use 
of iterative reconstructive technique. COMPARISON:  None FINDINGS: IMAGED 
URETERS: Mild right hydroureter. URINARY BLADDER: Mild diffuse mural thickening,
asymmetric to the right and posterior walls may be due to underdistention, 
cystitis, malignancy.REPRODUCTIVE ORGANS: Absent prostate gland and seminal 
vesicles. IMAGED BOWEL/MESENTERY: Colonic diverticulosis without acute diverti
culitis. No bowel obstruction or abnormal wall thickening. Normal appendix.IMAGE
D PERITONEUM/RETROPERITONEUM: Thickening of the right iliopsoas muscle with an i
ll-defined 5 x 2.7 x 4.2 cm fluid collection suspicious for an abscess. Mild dif
fuse fatty stranding in the pelvis. Trace free fluid in the pelvis. VESSELS: The
study is not optimized for evaluation of the venous structures. There is medial 
displacement and possibly compression of the right external iliac and internal 
iliac veins by the right iliopsoas collection. There are atherosclerotic calcifi
cations of the aorta and branches. LYMPH NODES: No pelvic lymphadenopathy.SOFT T
ISSUES: Small fat-containing bilateral inguinal hernias.BONES: Lower lumbar face
t arthropathy. IMPRESSION: Thickening of the right iliopsoas muscle with an ill-
defined 5 x 2.7 x 4.2 cm fluid collection suspicious for an abscess.  The study 
is not optimized for evaluation of the venous structures. There is medial displa
cement and possibly compression of the right external iliac and internal iliac v
eins by the right iliopsoas collection.  Mild diffuse mural thickening of the ur
inary bladder, asymmetric to the right and posterior walls may be due to underdi
stention, cystitis, malignancy or prior radiation. Mild right hydroureter. Urolo
gic correlation and follow-up is recommended. A follow-up CT urogram may be perf
ormed as clinically warranted. Signed: Arsalan Simon MDReport Verified Date/Time
:  2019 01:11:11     Electronically signed by: ARSALAN SIMON MD on
2019 01:11 AM BASIC METABOLIC [id number] 23:15:00* 



             Test Item    Value        Reference Range Interpretation Comments

 

             SODIUM (BEAKER) (test code = 381) 134 meq/L    136-145      L      

       

 

             POTASSIUM (BEAKER) (test code = 379) 4.1 meq/L    3.5-5.1          

          

 

             CHLORIDE (BEAKER) (test code = 382) 101 meq/L                

         

 

             CO2 (BEAKER) (test code = 355) 24 meq/L     22-29                  

    

 

             BLOOD UREA NITROGEN (BEAKER) (test code = 354) 18 mg/dL     7-21   

                    

 

             CREATININE (BEAKER) (test code = 358) 0.89 mg/dL   0.57-1.25       

           

 

             GLUCOSE RANDOM (BEAKER) (test code = 652) 120 mg/dL          

 H             

 

             CALCIUM (BEAKER) (test code = 697) 9.3 mg/dL    8.4-10.2           

        

 

             EGFR (BEAKER) (test code = 1092)                                   

     INSUFFICIENT CLINICAL DATA TO CALCULATE 

ESTIMATED GFR.





B-TYPE NATRIURETIC FACTOR (BNP)2019 23:09:00* 



             Test Item    Value        Reference Range Interpretation Comments

 

             B-TYPE NATRIURETIC PEPTIDE (BEAKER) (test code = 700) 97 pg/mL     

0-100                      





[id number] 23:04:00* 



             Test Item    Value        Reference Range Interpretation Comments

 

             PHOSPHORUS (BEAKER) (test code = 604) 3.9 mg/dL    2.3-4.7         

           





[id number] 23:04:00* 



             Test Item    Value        Reference Range Interpretation Comments

 

             MAGNESIUM (BEAKER) (test code = 627) 1.8 mg/dL    1.6-2.6          

          





HEPATIC FUNCTION [id number] 23:04:00* 



             Test Item    Value        Reference Range Interpretation Comments

 

             TOTAL PROTEIN (BEAKER) (test code = 770) 8.0 gm/dL    6.0-8.3      

              

 

             ALBUMIN (BEAKER) (test code = 1145) 3.1 g/dL     3.5-5.0      L    

         

 

             BILIRUBIN TOTAL (BEAKER) (test code = 377) 0.3 mg/dL    0.2-1.2    

                

 

             BILIRUBIN DIRECT (BEAKER) (test code = 706) 0.2 mg/dL    0.1-0.5   

                 

 

             ALKALINE PHOSPHATASE (BEAKER) (test code = 346) 90 U/L       

                     

 

             AST (SGOT) (BEAKER) (test code = 353) 23 U/L       5-34            

           

 

             ALT (SGPT) (BEAKER) (test code = 347) 24 U/L       6-55            

           





[id number] 23:03:00* 



             Test Item    Value        Reference Range Interpretation Comments

 

             PROTIME (BEAKER) (test code = 759) 14.2 seconds 11.9-14.2          

        

 

             INR (BEAKER) (test code = 370) 1.2          <=5.9                  

    

 

             PARTIAL THROMBOPLASTIN TIME (BEAKER) (test code = 760) 33.0 seconds

 22.5-36.0                 







Effective 2019: PT Reference Range ChangeNew: 11.9-14.2  Previous: 11.7-14.
7RECOMMENDED COUMADIN/WARFARIN INR THERAPY RANGESSTANDARD DOSE: 2.0-3.0  Include
s: PROPHYLAXIS for venous thrombosis, systemic embolization; TREATMENT for venou
s thrombosis and/or pulmonary embolus.HIGH RISK: Target INR is 2.5-3.5 for patie
nts wiht mechanical heart valves.CBC W/PLT COUNT & AUTO [id number] 
22:44:00* 



             Test Item    Value        Reference Range Interpretation Comments

 

             WHITE BLOOD CELL COUNT (BEAKER) (test code = 775) 9.1 K/ L     3.5-

10.5                   

 

             RED BLOOD CELL COUNT (BEAKER) (test code = 761) 3.45 M/ L    4.63-6

.08    L             

 

             HEMOGLOBIN (BEAKER) (test code = 410) 9.1 GM/DL    13.7-17.5    L  

           

 

             HEMATOCRIT (BEAKER) (test code = 411) 29.7 %       40.1-51.0    L  

           

 

             MEAN CORPUSCULAR VOLUME (BEAKER) (test code = 753) 86.1 fL      79.

0-92.2                  

 

             MEAN CORPUSCULAR HEMOGLOBIN (BEAKER) (test code = 751) 26.4 pg     

 25.7-32.2                  

 

                    MEAN CORPUSCULAR HEMOGLOBIN CONC (BEAKER) (test code = 752) 

30.6 GM/DL          32.3-36.5

                          L                          

 

             RED CELL DISTRIBUTION WIDTH (BEAKER) (test code = 412) 16.2 %      

 11.6-14.4    H             

 

             PLATELET COUNT (BEAKER) (test code = 756) 393 K/CU MM  150-450     

               

 

             MEAN PLATELET VOLUME (BEAKER) (test code = 754) 9.2 fL       9.4-12

.4     L             

 

             NUCLEATED RED BLOOD CELLS (BEAKER) (test code = 413) 0 /100 WBC   0

-0                        

 

             NEUTROPHILS RELATIVE PERCENT (BEAKER) (test code = 429) 75 %       

                             

 

             LYMPHOCYTES RELATIVE PERCENT (BEAKER) (test code = 430) 12 %       

                             

 

             MONOCYTES RELATIVE PERCENT (BEAKER) (test code = 431) 10 %         

                           

 

             EOSINOPHILS RELATIVE PERCENT (BEAKER) (test code = 432) 2 %        

                             

 

             BASOPHILS RELATIVE PERCENT (BEAKER) (test code = 437) 0 %          

                           

 

             NEUTROPHILS ABSOLUTE COUNT (BEAKER) (test code = 670) 6.82 K/ L    

1.78-5.38    H             

 

             LYMPHOCYTES ABSOLUTE COUNT (BEAKER) (test code = 414) 1.10 K/ L    

1.32-3.57    L             

 

             MONOCYTES ABSOLUTE COUNT (BEAKER) (test code = 415) 0.90 K/ L    0.

30-0.82    H             

 

             EOSINOPHILS ABSOLUTE COUNT (BEAKER) (test code = 416) 0.16 K/ L    

0.04-0.54                  

 

             BASOPHILS ABSOLUTE COUNT (BEAKER) (test code = 417) 0.02 K/ L    0.

01-0.08                  

 

             IMMATURE GRANULOCYTES-RELATIVE PERCENT (BEAKER) (test code = 2801) 

1 %          0-1                        





RETROGRADE [id number] 08:57:00                                         
                                            [place] [address]   
  Patient Name: ABE JEREZ                                   MR #: 
[id number]                     : 1950                                  
Age/Sex: 69/M  Acct #: [id number]                              Req #: [id number]
[id number]  Adm Physician: PAN ORTIZ MD                                      
Ordered by: PAOLA SMITH MD                            Report #: [id number]      
 Location: MED/SURG3                               Room/Bed: [facility]              
________________________________________________
___________________________________________________    Procedure:  DX/R
ETROGRADE PYELOGRAM  Exam Date: 19                            Exam Time: 1
200                                              REPORT STATUS: Signed       Ret
rograde urethrogram and pyelogram.         History: Right iliac muscle abscess. 
    Comparison: None available.      Discussion: Procedure was performed by uro
logy. Contrast was injected in a   retrograde fashion into the urethra and bilat
eral ureters ureter.  Multiple   images were obtained.  Fluoro time: 0.3 minutes
. Dose: 7.4 mGy (ROSINA)             There is filling of the urethra without eviden
ce of stricture, filling defect,   or luminal irregularity. There is filling of 
bilateral ureters with contrast   noted to flow into the bilateral renal collect
ing systems without evidence of   stricture, filling defect, or luminal irregula
rity.           IMPRESSION:   Normal retrograde urethrogram and pyelogram.      
Signed by: Matt Marshall on 2019 9:09 AM        Dictated By: MATT MARSHALL MD  Electronically Signed By: MATT MARSHALL MD on 19  Transcribed By:
CATARINO on 19       COPY TO:   PAOLA SMITH MD         URETHROGRAM 
(RETRO)2019 08:57:00                                                      
                               [place] [address]      Patient 
Name: ABE JEREZ                                   MR #: [id number]        
            : 1950                                   Age/Sex: 69/M  
Acct #: [id number]                              Req #: [id number]  Adm 
Physician: PAN ORTIZ MD                                      Ordered by: 
PAOLA SMITH MD                            Report #: [id number]        Location: 
[facility]                               Room/Bed: [facility]               
________________________________________________
___________________________________________________    Procedure: [id number] DX/U
RETHROGRAM (RETRO)  Exam Date: 19                            Exam Time: 11
36                                              REPORT STATUS: Signed       Retr
ograde urethrogram and pyelogram.         History: Right iliac muscle abscess.  
   Comparison: None available.      Discussion: Procedure was performed by urol
asa. Contrast was injected in a   retrograde fashion into the urethra and bilate
ral ureters ureter.  Multiple   images were obtained.  Fluoro time: 0.3 minutes.
Dose: 7.4 mGy (ROSINA)             There is filling of the urethra without evidence
of stricture, filling defect,   or luminal irregularity. There is filling of b
ilateral ureters with contrast   noted to flow into the bilateral renal collecti
ng systems without evidence of   stricture, filling defect, or luminal irregular
ity.           IMPRESSION:   Normal retrograde urethrogram and pyelogram.       
Signed by: Matt Marshall on 2019 9:09 AM        Dictated By: MATT MARSHALL MD  Electronically Signed By: MATT MARSHALL MD on 19  Transcribed By:
CATARINO on 19       COPY TO:   PAOLA SMITH MD         Bedside Glucose
2019 11:45:00* 



             Test Item    Value        Reference Range Interpretation Comments

 

             Bedside Glucose (test code = 34055-8) 152                 H  

           





Meter ID: [id number] [facility]Different[facility] 
Cells [id number] 08:10:00* 



             Test Item    Value        Reference Range Interpretation Comments

 

                          Differential Total Cells Counted (test code = Differajith

tial Total Cells Counted) 

100                                                          





[facility] 08:10:00
  * 



             Test Item    Value        Reference Range Interpretation Comments

 

             Neutrophils % (Manual) (test code = 95157-7) 82           40-74    

    H             





[facility] % (Manual)2019 08:10:00
  * 



             Test Item    Value        Reference Range Interpretation Comments

 

             Lymphocytes % (Manual) (test code = 737-7) 8            19-48      

  L             





[facility] (Manual)2019 08:10:00* 



             Test Item    Value        Reference Range Interpretation Comments

 

             Monocytes % (Manual) (test code = 744-3) 8            3.4-9.0      

              





[facility] % (Manual)2019 08:10:00
  * 



             Test Item    Value        Reference Range Interpretation Comments

 

             Eosinophils % (Manual) (test code = 714-6) 2            0-7        

                





[facility] HospitalPlatelet [id number] 08:10:00* 



             Test Item    Value        Reference Range Interpretation Comments

 

             Platelet Estimate (test code = 17336-9) ADEQUATE                   

             





[facility] Hospital[facility] Morphology [id number] 
08:10:00* 



             Test Item    Value        Reference Range Interpretation Comments

 

             Platelet Morphology Comment (test code = 80372-8) NORMAL           

                       





[facility] HospitalProthrombin [id number] 07:13:00* 



             Test Item    Value        Reference Range Interpretation Comments

 

             Prothrombin Time (test code = 5902-2) 13.8         11.9-14.5       

           





[facility] HospitalProthromb Time International Ratio
2019 07:13:00* 



             Test Item    Value        Reference Range Interpretation Comments

 

             Prothromb Time International Ratio (test code = 6301-6) 1.01       

                             





Oral Anticoagulant Therapy INR Values:1. Low Intensity Therapy        1.5 - 2.02
. Moderate Intensity Therapy   2.0 - 3.03. High Intensity Therapy(1)    2.5 - 3.
54. High Intensity Therapy(2)    3.0 - 4.05. Panic Value INR              > 5.0
[facility] HospitalActivated Partial Thromboplast Time
2019 07:13:00* 



             Test Item    Value        Reference Range Interpretation Comments

 

             Activated Partial Thromboplast Time (test code = 91280-7) 28.1     

    23.8-35.5                  





[facility] Fjtei8598-54-64 07:11:00* 



             Test Item    Value        Reference Range Interpretation Comments

 

             White Blood Count (test code = 6690-2) 6.78         4.8-10.8       

            





[facility] Yrkze6246-81-19 07:11:00* 



             Test Item    Value        Reference Range Interpretation Comments

 

             Red Blood Count (test code = 789-8) 3.46         4.3-5.7      L    

         





[facility]Hemoglobin2019-08-12 07:11:00* 



             Test Item    Value        Reference Range Interpretation Comments

 

             Hemoglobin (test code = 51269-0) 9.2          14.0-18.0    L       

      





[facility]Hematocrit2019-08-12 07:11:00* 



             Test Item    Value        Reference Range Interpretation Comments

 

             Hematocrit (test code = 4544-3) 29.9         38.2-49.6    L        

     





[facility] HospitalMean Corpuscular Kfnrun8856-85-71 
07:11:00* 



             Test Item    Value        Reference Range Interpretation Comments

 

             Mean Corpuscular Volume (test code = 787-2) 86.4         81-99     

                 





[facility] HospitalMean Corpuscular Mmjsmdedxm9429-88-04 
07:11:00* 



             Test Item    Value        Reference Range Interpretation Comments

 

             Mean Corpuscular Hemoglobin (test code = 785-6) 26.6         28-32 

       L             





[facility] HospitalMean Corpuscular Hemoglobin Concent
2019 07:11:00* 



             Test Item    Value        Reference Range Interpretation Comments

 

             Mean Corpuscular Hemoglobin Concent (test code = 786-4) 30.8       

  31-35        L             





[facility] HospitalRed Cell Distribution Truob6892-05-06 
07:11:00* 



             Test Item    Value        Reference Range Interpretation Comments

 

             Red Cell Distribution Width (test code = 78363-2) 15.1         11.7

-14.4    H             





[facility] HospitalPlatelet Qmrmi4141-99-09 07:11:00* 



             Test Item    Value        Reference Range Interpretation Comments

 

             Platelet Count (test code = 777-3) 353          140-360            

        





[facility] HospitalNeutrophils (%) (Auto)2019 07:11:00
  * 



             Test Item    Value        Reference Range Interpretation Comments

 

             Neutrophils (%) (Auto) (test code = 23782-8) 75.4         38.7-80.0

                  





[facility] HospitalLymphocytes (%) (Auto)2019 07:11:00
  * 



             Test Item    Value        Reference Range Interpretation Comments

 

             Lymphocytes (%) (Auto) (test code = 736-9) 8.6          18.0-39.1  

  L             





[facility] HospitalMonocytes (%) (Auto)2019 07:11:00* 



             Test Item    Value        Reference Range Interpretation Comments

 

             Monocytes (%) (Auto) (test code = 5905-5) 11.1         4.4-11.3    

               





[facility] HospitalEosinophils (%) (Auto)2019 07:11:00
  * 



             Test Item    Value        Reference Range Interpretation Comments

 

             Eosinophils (%) (Auto) (test code = 713-8) 4.0          0.0-6.0    

                





[facility] HospitalBasophils (%) (Auto)2019 07:11:00* 



             Test Item    Value        Reference Range Interpretation Comments

 

             Basophils (%) (Auto) (test code = 706-2) 0.3          0.0-1.0      

              





[facility] HospitalIM GRANULOCYTES %2019 07:11:00* 



             Test Item    Value        Reference Range Interpretation Comments

 

             IM GRANULOCYTES % (test code = IM GRANULOCYTES %) 0.6          0.0-

1.0                    





[facility] HospitalNeutrophils # (Auto)2019 07:11:00* 



             Test Item    Value        Reference Range Interpretation Comments

 

             Neutrophils # (Auto) (test code = 751-8) 5.1          2.1-6.9      

              





[facility] HospitalLymphocytes # (Auto)2019 07:11:00* 



             Test Item    Value        Reference Range Interpretation Comments

 

             Lymphocytes # (Auto) (test code = 49848-2) 0.6          1.0-3.2    

  L             





[facility] HospitalMonocytes # (Auto)2019 07:11:00* 



             Test Item    Value        Reference Range Interpretation Comments

 

             Monocytes # (Auto) (test code = 742-7) 0.8          0.2-0.8        

            





[facility] HospitalEosinophils # (Auto)2019 07:11:00* 



             Test Item    Value        Reference Range Interpretation Comments

 

             Eosinophils # (Auto) (test code = 711-2) 0.3          0.0-0.4      

              





[facility] HospitalBasophils # (Auto)2019 07:11:00* 



             Test Item    Value        Reference Range Interpretation Comments

 

             Basophils # (Auto) (test code = 704-7) 0.0          0.0-0.1        

            





[facility] HospitalAbsolute Immature Granulocyte (auto
2019 07:11:00* 



             Test Item    Value        Reference Range Interpretation Comments

 

                                        Absolute Immature Granulocyte (auto (bronson

t code = Absolute Immature Granulocyte 

(auto)          0.04            0-0.1                            





[facility]odium Djxhb4126[facility]43 [facility]07[facility]* 



             Test Item    Value        Reference Range Interpretation Comments

 

             Sodium Level (test code = 2951-2) 138          136-145             

       





[facility] HospitalPotassium Rwilu5996-78-07 07:07:00* 



             Test Item    Value        Reference Range Interpretation Comments

 

             Potassium Level (test code = 2823-3) 3.4          3.5-5.1      L   

          





[facility] HospitalChloride Cxfsq2909-12-93 07:07:00* 



             Test Item    Value        Reference Range Interpretation Comments

 

             Chloride Level (test code = 2075-0) 104                      

         





[facility] HospitalCarbon Dioxide Kzrpe8293-22-56 07:07:00* 



             Test Item    Value        Reference Range Interpretation Comments

 

             Carbon Dioxide Level (test code = 2028-9) 26           22-29       

               





[facility] HospitalAnion Ijw6033-18-33 07:07:00* 



             Test Item    Value        Reference Range Interpretation Comments

 

             Anion Gap (test code = 33037-3) 11.4         8-16                  

     





[facility] HospitalBlood Urea Xtkrgvwv2790-84-66 07:07:00* 



             Test Item    Value        Reference Range Interpretation Comments

 

             Blood Urea Nitrogen (test code = 3094-0) 10           7-26         

              





[facility] HospitalCreatinine2019-08-12 07:07:00* 



             Test Item    Value        Reference Range Interpretation Comments

 

             Creatinine (test code = 2160-0) 0.73         0.72-1.25             

     





[facility] HospitalBUN/Creatinine Nnstt4201-70-45 07:07:00* 



             Test Item    Value        Reference Range Interpretation Comments

 

             BUN/Creatinine Ratio (test code = 3097-3) 14           6-25        

               





[facility] HospitalEstimat Glomerular Filtration Rate
2019 07:07:00* 



             Test Item    Value        Reference Range Interpretation Comments

 

             Estimat Glomerular Filtration Rate (test code = 581342169) > 60    

     >60                        





Ranges were taken from the National Kidney Disease Education Program and the [facility] literature.Reference ranges:60 or greater: Wxunuc58-92 (
for 3 consecutive months): Chronic kidney disease 15 or less: Kidney failureFormerly [facility] HospitalGlucose Uwddp8760-58-35 07:07:00* 



             Test Item    Value        Reference Range Interpretation Comments

 

             Glucose Level (test code = GBM9962) 130                 H    

         





[facility] HospitalCalcium Objpj3083-24-94 07:07:00* 



             Test Item    Value        Reference Range Interpretation Comments

 

             Calcium Level (test code = 41119-2) 9.0          8.4-10.2          

         





[facility] HospitalMagnesium Zouui7864-09-04 07:07:00* 



             Test Item    Value        Reference Range Interpretation Comments

 

             Magnesium Level (test code = 21101-3) 1.6          1.3-2.1         

           





[facility] HospitalTotal Oqaommdfu1459-51-05 08:30:00* 



             Test Item    Value        Reference Range Interpretation Comments

 

             Total Bilirubin (test code = 1975-2) 0.2          0.2-1.2          

          





[facility] HospitalAspartate Amino Transf (AST/SGOT)
2019 08:30:00* 



             Test Item    Value        Reference Range Interpretation Comments

 

                                        Aspartate Amino Transf (AST/SGOT) (test 

code = Aspartate Amino Transf 

(AST/SGOT))     10              5-34                             





[facility] HospitalAlanine Aminotransferase (ALT/SGPT)
2019 08:30:00* 



             Test Item    Value        Reference Range Interpretation Comments

 

             Alanine Aminotransferase (ALT/SGPT) (test code = 1742-6) 11        

   0-55                       





[facility] HospitalTotal Wjkefdf0607-41-63 08:30:00* 



             Test Item    Value        Reference Range Interpretation Comments

 

             Total Protein (test code = 2885-2) 6.6          6.5-8.1            

        





[facility] HospitalAlbumin2019-08-11 08:30:00* 



             Test Item    Value        Reference Range Interpretation Comments

 

             Albumin (test code = 1751-7) 2.4          3.5-5.0      L           

  





[facility] HospitalGlobulin2019-08-11 08:30:00* 



             Test Item    Value        Reference Range Interpretation Comments

 

             Globulin (test code = 61838-8) 4.2          2.3-3.5      H         

    





[facility] HospitalAlbumin/Globulin Gacuw0993-52-91 08:30:00
  * 



             Test Item    Value        Reference Range Interpretation Comments

 

             Albumin/Globulin Ratio (test code = 1759-0) 0.6          0.8-2.0   

   L             





[facility] HospitalAlkaline Eajuecwttbp1487-28-47 08:30:00* 



             Test Item    Value        Reference Range Interpretation Comments

 

             Alkaline Phosphatase (test code = 6768-6) 61                 

               





[facility] HospitalCT ABDOMEN/PELVIS -08-10 22:49:00   
                                                                                
 [facility]                        [address] [address]      Patient Name: ABE JEREZ          
                        MR #: [id number]                     : 1950    
                              Age/Sex: 69/M  Acct #: [id number]               
              Req #: [id number]  Adm Physician:                                 
                    Ordered by: DUNIA RAMIREZ MD                            
Report #: [id number]        Location: ER                                      
Room/Bed:                     _____________________________________________
______________________________________________________    Procedure: [id number]
T/CT ABDOMEN/PELVIS W  Exam Date: 08/10/19                            Exam Time:
                                              REPORT STATUS: Signed    EXAM:
CT Abdomen and Pelvis WITH contrast     INDICATION: Blood in urine. Painful ur
ination.   COMPARISON: None.   TECHNIQUE: Abdomen and pelvis were scanned utiliz
ing a multidetector helical   scanner from the lung base to the pubic symphysis 
after administration of IV   contrast. Coronal and sagittal reformations were ob
tained. Routine protocol was   performed. Scan was performed when during portal 
venous phase.               IV CONTRAST: 100 cc Isovue-300               ORAL CO
NTRAST: Water               RADIATION DOSE: Total DLP: 712.11 mGy*cm            
   Estimated effective dose: (DLP x 0.015 x size factor) mSv               COMP
LICATIONS: None      FINDINGS:      LINES and TUBES: None.      LOWER THORAX:  6
mm groundglass nodule in the right middle lobe on image 1   series 2. Bibasilar 
dependent atelectasis. 8 mm noncalcified subpleural nodule   in the lingula on 
images 6 series 2. Minimal lingular atelectasis on image 6   series 2.      HEPA
TOBILIARY:      No focal hepatic lesions. No biliary ductal dilation.       GALL
BLADDER: No radio-opaque stones or sludge.  No wall thickening.      SPLEEN: No 
splenomegaly.       PANCREAS: No focal masses or ductal dilatation.        ADREN
ALS: No adrenal nodules          KIDNEYS/URETERS: Kidneys enhance symmetrically.
 No hydronephrosis. 3.6 cm cyst   in the lower pole of the left kidney associat
ed with punctate calcification.    No stones.      GI TRACT: No abnormal distent
ion, wall thickening, or evidence of bowel   obstruction.       Appendix is norm
al.      PELVIC ORGANS/BLADDER: There is diffuse asymmetric wall thickening of t
he   urinary bladder, associated with perivesicular fat stranding, which may ref
lect   cystitis in the proper clinical setting.      LYMPH NODES: No lymphadenop
athy.      VESSELS: There is mild atherosclerotic disease in the aorta and major
arterial   branches.      PERITONEUM / RETROPERITONEUM: No free air or fluid.   
  BONES: Bilateral small fat-containing inguinal hernias.      SOFT TISSUES: T
here is a low-attenuation fluid collection within the right   iliac is muscle wi
th mild surrounding peripheral enhancement measuring 4.9 x   2.8 cm on image 73 
series 2 which may represent an abscess.      IMPRESSION:    1.  Findings consis
tent with cystitis in the proper clinical setting. Recommend   follow-up after t
reatment to document resolution and adnexal the underlying   pathology such as n
eoplasm.   2.  4.9 cm mildly peripherally enhancing collection within the right 
iliac is   muscle may represent an abscess. Bursal fluid is felt to be less like
ly.   3.  Minimally complex cyst in the lower pole of the left knee.      Signed
by: Dr. LUIS ARMANDO Chahal M.D. on 8/10/2019 11:08 PM        Dictated By: ROQUE CHAHAL MD, MD  Electronically Signed By: ANIRUDH CHAHAL MD, MD on 08/10/19 2
308  Transcribed By: CATARINO on 08/10/19 6420       COPY TO:   DUNIA RAMIREZ         Urine [id number] 20:17:00* 



             Test Item    Value        Reference Range Interpretation Comments

 

             Urine WBC (test code = 5821-4) 0-5          0-5                    

    





[facility] HospitalUrine RBC2019-08-10 20:17:00* 



             Test Item    Value        Reference Range Interpretation Comments

 

             Urine RBC (test code = 44945-5) >50          0-5          H        

     





[facility] HospitalUrine Bacteria2019-08-10 20:17:00* 



             Test Item    Value        Reference Range Interpretation Comments

 

             Urine Bacteria (test code = 06547-4) RARE         NONE             

          





[facility] HospitalUrine Epithelial Cells2019-08-10 20:17:00
  * 



             Test Item    Value        Reference Range Interpretation Comments

 

             Urine Epithelial Cells (test code = 89833-3) RARE         NONE     

                  





[facility] HospitalUrine Color2019-08-10 20:01:00* 



             Test Item    Value        Reference Range Interpretation Comments

 

             Urine Color (test code = 5778-6) YELLOW       YELLOW               

      





[facility] HospitalUrine Lblbmsk5193-49-82 20:01:00* 



             Test Item    Value        Reference Range Interpretation Comments

 

             Urine Clarity (test code = 57054-8) SL CLOUDY    CLEAR        H    

         





[facility] HospitalUrine Specific Gravity2019-08-10 20:01:00
  * 



             Test Item    Value        Reference Range Interpretation Comments

 

             Urine Specific Gravity (test code = 5811-5) 1.020        1.010-1.02

5                





[facility] HospitalUrine pH2019-08-10 20:01:00* 



             Test Item    Value        Reference Range Interpretation Comments

 

             Urine pH (test code = 10681-5) 7            5-7                    

    





[facility] HospitalUrine Leukocyte Esterase2019-08-10 
20:01:00* 



             Test Item    Value        Reference Range Interpretation Comments

 

             Urine Leukocyte Esterase (test code = 93885-1) NEGATIVE     NEGATIV

E                   





[facility] HospitalUrine Nitrite2019-08-10 20:01:00* 



             Test Item    Value        Reference Range Interpretation Comments

 

             Urine Nitrite (test code = 30978-2) NEGATIVE     NEGATIVE          

         





[facility] HospitalUrine Protein2019-08-10 20:01:00* 



             Test Item    Value        Reference Range Interpretation Comments

 

             Urine Protein (test code = 57735-3) 3+           NEGATIVE     H    

         





[facility] HospitalUrine Glucose (UA)2019-08-10 20:01:00* 



             Test Item    Value        Reference Range Interpretation Comments

 

             Urine Glucose (UA) (test code = 21251-6) 3+           NEGATIVE     

              





[facility] HospitalUrine Lzcmfce5550-14-75 20:01:00* 



             Test Item    Value        Reference Range Interpretation Comments

 

             Urine Ketones (test code = 14494-0) NEGATIVE     NEGATIVE          

         





[facility] HospitalUrine Urobilinogen2019-08-10 20:01:00* 



             Test Item    Value        Reference Range Interpretation Comments

 

             Urine Urobilinogen (test code = 82375-2) 0.2          0.2-1        

              





[facility] HospitalUrine Bilirubin2019-08-10 20:01:00* 



             Test Item    Value        Reference Range Interpretation Comments

 

             Urine Bilirubin (test code = 1977-8) NEGATIVE     NEGATIVE         

          





[facility] HospitalUrine Blood2019-08-10 20:01:00* 



             Test Item    Value        Reference Range Interpretation Comments

 

             Urine Blood (test code = 27540-5) 3+           NEGATIVE            

       





[facility] HospitalNM BONE SCAN WHOLE [id number] 
14:41:14CLINICAL INDICATION:   dx: c61, r/o mets, prostate ca, 
asymptomaticMODALITY:  Dragon Law dual head gamma cameraTECHNIQUE:  25 mCi Tc 
99m MDP are injected IV. After a suitable time delay, whole body imaging images 
are obtained.FINDINGS:COMPARISON:   Multi para metric prostate MRI.Symmetric 
bilateral renal function is observed.Periodontal uptake is noted within the 
maxilla and right mandible.There are mild to moderate arthritic changes seen at 
the acromioclavicular, glenohumeral, sternoclavicular, wrist, medial knee 
compartments bilaterally. Mild to moderate thoracic spondylosis is seen. Facet 
arthritic changes are noted left L4-5 and right L5-S1 facet. Focal uptake is 
present at the dorsal left calcaneus.No lytic or blastic osseous metastasis is 
observed.IMPRESSION:Negative for evidence of osteoblastic metastatic 
disease.PQRS 147: 3570F

## 2020-11-11 VITALS — SYSTOLIC BLOOD PRESSURE: 134 MMHG | DIASTOLIC BLOOD PRESSURE: 86 MMHG

## 2020-11-11 VITALS — DIASTOLIC BLOOD PRESSURE: 105 MMHG | SYSTOLIC BLOOD PRESSURE: 148 MMHG

## 2020-11-11 VITALS — DIASTOLIC BLOOD PRESSURE: 93 MMHG | SYSTOLIC BLOOD PRESSURE: 146 MMHG

## 2020-11-11 VITALS — DIASTOLIC BLOOD PRESSURE: 79 MMHG | SYSTOLIC BLOOD PRESSURE: 138 MMHG

## 2020-11-11 VITALS — SYSTOLIC BLOOD PRESSURE: 150 MMHG | DIASTOLIC BLOOD PRESSURE: 88 MMHG

## 2020-11-11 LAB
ALBUMIN SERPL-MCNC: 3.3 G/DL (ref 3.5–5)
ALBUMIN/GLOB SERPL: 1.1 {RATIO} (ref 0.8–2)
ALP SERPL-CCNC: 37 IU/L (ref 40–150)
ALT SERPL-CCNC: 15 IU/L (ref 0–55)
AMYLASE SERPL-CCNC: 81 U/L (ref 25–125)
ANION GAP SERPL CALC-SCNC: 10.6 MMOL/L (ref 8–16)
BASOPHILS # BLD AUTO: 0 10*3/UL (ref 0–0.1)
BASOPHILS NFR BLD AUTO: 0.4 % (ref 0–1)
BUN SERPL-MCNC: 12 MG/DL (ref 7–26)
BUN/CREAT SERPL: 15 (ref 6–25)
CALCIUM SERPL-MCNC: 8.3 MG/DL (ref 8.4–10.2)
CHLORIDE SERPL-SCNC: 105 MMOL/L (ref 98–107)
CO2 SERPL-SCNC: 29 MMOL/L (ref 22–29)
DEPRECATED NEUTROPHILS # BLD AUTO: 3.1 10*3/UL (ref 2.1–6.9)
EGFRCR SERPLBLD CKD-EPI 2021: > 60 ML/MIN (ref 60–?)
EOSINOPHIL # BLD AUTO: 0.5 10*3/UL (ref 0–0.4)
EOSINOPHIL NFR BLD AUTO: 10.2 % (ref 0–6)
ERYTHROCYTE [DISTWIDTH] IN CORD BLOOD: 15.5 % (ref 11.7–14.4)
GLOBULIN PLAS-MCNC: 2.9 G/DL (ref 2.3–3.5)
GLUCOSE SERPLBLD-MCNC: 169 MG/DL (ref 74–118)
HCT VFR BLD AUTO: 41.7 % (ref 38.2–49.6)
HGB BLD-MCNC: 13.2 G/DL (ref 14–18)
LIPASE SERPL-CCNC: 71 U/L (ref 8–78)
LYMPHOCYTES # BLD: 0.8 10*3/UL (ref 1–3.2)
LYMPHOCYTES NFR BLD AUTO: 16.4 % (ref 18–39.1)
MCH RBC QN AUTO: 28.4 PG (ref 28–32)
MCHC RBC AUTO-ENTMCNC: 31.7 G/DL (ref 31–35)
MCV RBC AUTO: 89.9 FL (ref 81–99)
MONOCYTES # BLD AUTO: 0.6 10*3/UL (ref 0.2–0.8)
MONOCYTES NFR BLD AUTO: 11.2 % (ref 4.4–11.3)
NEUTS SEG NFR BLD AUTO: 61.2 % (ref 38.7–80)
PLATELET # BLD AUTO: 182 X10E3/UL (ref 140–360)
POTASSIUM SERPL-SCNC: 3.6 MMOL/L (ref 3.5–5.1)
RBC # BLD AUTO: 4.64 X10E6/UL (ref 4.3–5.7)
SODIUM SERPL-SCNC: 141 MMOL/L (ref 136–145)

## 2020-11-11 PROCEDURE — 0FT44ZZ RESECTION OF GALLBLADDER, PERCUTANEOUS ENDOSCOPIC APPROACH: ICD-10-PCS | Performed by: SURGERY

## 2020-11-11 RX ADMIN — SODIUM CHLORIDE SCH MLS/HR: 9 INJECTION, SOLUTION INTRAVENOUS at 21:26

## 2020-11-11 RX ADMIN — INSULIN HUMAN SCH UNIT: 100 INJECTION, SOLUTION PARENTERAL at 11:30

## 2020-11-11 RX ADMIN — INSULIN HUMAN SCH UNIT: 100 INJECTION, SOLUTION PARENTERAL at 21:00

## 2020-11-11 RX ADMIN — TAZOBACTAM SODIUM AND PIPERACILLIN SODIUM SCH GM: 375; 3 INJECTION, SOLUTION INTRAVENOUS at 16:46

## 2020-11-11 RX ADMIN — INSULIN HUMAN SCH UNIT: 100 INJECTION, SOLUTION PARENTERAL at 07:30

## 2020-11-11 RX ADMIN — TAZOBACTAM SODIUM AND PIPERACILLIN SODIUM SCH GM: 375; 3 INJECTION, SOLUTION INTRAVENOUS at 22:46

## 2020-11-11 RX ADMIN — TAZOBACTAM SODIUM AND PIPERACILLIN SODIUM SCH GM: 375; 3 INJECTION, SOLUTION INTRAVENOUS at 05:16

## 2020-11-11 RX ADMIN — FAMOTIDINE SCH MG: 10 INJECTION, SOLUTION INTRAVENOUS at 08:07

## 2020-11-11 RX ADMIN — INSULIN HUMAN SCH UNIT: 100 INJECTION, SOLUTION PARENTERAL at 16:30

## 2020-11-11 RX ADMIN — SODIUM CHLORIDE SCH MLS/HR: 9 INJECTION, SOLUTION INTRAVENOUS at 05:16

## 2020-11-11 NOTE — PROGRESS NOTE
DATE:  11/11/2020  

 

CONSULTANTS:  

1. Dr. Barr, Surgical team.

2. Dr. Romero, GI.

 

SUBJECTIVE:  The patient is sitting up in bed, reports has more left-sided abdominal

pain than the right upper quadrant.  HIDA scan is pending.  He denies any dysuria, chest

pain, shortness of breath, nausea, vomiting, fever, or chills. 

 

OBJECTIVE:  VITAL SIGNS:  Temperature is 98.0, pulse is 81, respirations 17, blood

pressure 134/86, pulse ox is 98% on room air. 

GENERAL:  No acute distress. 

HEENT:  Normocephalic and atraumatic. 

NECK:  Supple. 

CARDIOVASCULAR:  Regular rate and rhythm. 

LUNGS:  Clear to auscultation. 

ABDOMEN:  Soft, mild tenderness in the right upper quadrant, increased on the left upper

and lower quadrants. 

NEURO:  Alert, awake, and oriented x3. 

MUSCULOSKELETAL:  Moves all extremities.  No edema. 

SKIN:  Dry and intact. 

PSYCH:  Calm and pleasant.

 

LABORATORY DATA:  WBC 5.11, hemoglobin 13.2, hematocrit 41.7, and platelets 182.  Sodium

141, potassium 3.6, CO2 of 29, creatinine 0.8, estimated GFR is greater than 60, blood

glucose 169.  AST 15, ALT 15. 

 

IMAGING:  MRCP shows cholelithiasis.  No findings to acute cholecystitis or

choledocholithiasis.  No biliary duct dilation, unremarkable appearance of the pancreas. 

 

IMPRESSION AND PLAN:  

1. Abdominal pain, likely due to gallstones.  Imaging shows cholelithiasis without acute

cholecystitis.  Lipase is elevated to 121, but imaging did not show pancreatitis.  We

will continue with IV fluids and pain management.  Dr. Barr with Surgical team has been

consulted.  Pending HIDA is positive, we will proceed with laparoscopic cholecystectomy. 

2. Left-sided abdominal pain.  GI has been consulted.  Plans are EGD and colonoscopy for

further evaluation. 

3. Acute kidney injury.  Creatinine is improving with IV fluids.

4. Hypertension, stable.  We will treat with hydralazine as needed.

5. Diabetes type 2.  Sliding scale insulin when p.o. started.

6. High cholesterol.  We will resume statin once p.o. started.

7. Prostate cancer.  CT abdomen is suggestive of urinary bladder cystitis.  UA is

negative.  We will continue with Zosyn. 

8. Gastrointestinal and deep venous thrombosis prophylaxis.  No anticoagulation due to

possible surgery/GI workup. 

 

Dictated by Ada Shaw, ANP

 

______________________________

MD OLIVE Webster/BAY

D:  11/11/2020 17:57:11

T:  11/11/2020 19:46:03

Job #:  [id number]

## 2020-11-11 NOTE — DIAGNOSTIC IMAGING REPORT
Hepatobiliary Scan with Gallbladder Ejection Fraction



Reason for exam: RUQ pain; pancreatitis



Technique: Following intravenous administration of 6.4 millicuries of Tc-99m

mebrofenin, dynamic images of the abdomen in the anterior projection were

obtained through 60 minutes.  Sincalide (CCK analog) 2.0 micrograms was

administered intravenously over 30 minutes with additional imaging for

determination of gallbladder ejection fraction.



Discussion: Perfusion of the liver is normal.  Extraction of tracer by the

liver parenchyma is normal.  Tracer appears promptly within the biliary tract. 

The gallbladder begins to fill  by 10 minutes post injection of tracer and

fills adequately.  Tracer is seen in the small bowel during the sincalide

infusion.  There is no contractile response by the gallbladder to the

pharmacologic dose of sincalide.  No emptying of the gallbladder occurs during

the 30 minute infusion.  

Impression: 



1.  Filling of the gallbladder excludes acute cystic duct obstruction/acute

cholecystitis.

2.  The gallbladder ejection fraction is undefined as there is no emptying of

the gallbladder during the infusion of sincalide.  This absence of a

contractile response to sincalide supports the clinical diagnosis of chronic

cholecystitis/gallbladder dyskinesia.



Signed by: Dr. Nancy Little M.D. on 11/11/2020 7:52 PM

## 2020-11-12 VITALS — SYSTOLIC BLOOD PRESSURE: 142 MMHG | DIASTOLIC BLOOD PRESSURE: 90 MMHG

## 2020-11-12 VITALS — DIASTOLIC BLOOD PRESSURE: 80 MMHG | SYSTOLIC BLOOD PRESSURE: 141 MMHG

## 2020-11-12 VITALS — SYSTOLIC BLOOD PRESSURE: 151 MMHG | DIASTOLIC BLOOD PRESSURE: 111 MMHG

## 2020-11-12 VITALS — DIASTOLIC BLOOD PRESSURE: 87 MMHG | SYSTOLIC BLOOD PRESSURE: 157 MMHG

## 2020-11-12 VITALS — DIASTOLIC BLOOD PRESSURE: 95 MMHG | SYSTOLIC BLOOD PRESSURE: 153 MMHG

## 2020-11-12 VITALS — SYSTOLIC BLOOD PRESSURE: 141 MMHG | DIASTOLIC BLOOD PRESSURE: 80 MMHG

## 2020-11-12 VITALS — DIASTOLIC BLOOD PRESSURE: 84 MMHG | SYSTOLIC BLOOD PRESSURE: 144 MMHG

## 2020-11-12 RX ADMIN — ATORVASTATIN CALCIUM SCH MG: 10 TABLET, FILM COATED ORAL at 20:27

## 2020-11-12 RX ADMIN — INSULIN HUMAN SCH UNIT: 100 INJECTION, SOLUTION PARENTERAL at 17:33

## 2020-11-12 RX ADMIN — SODIUM CHLORIDE SCH MLS/HR: 9 INJECTION, SOLUTION INTRAVENOUS at 10:29

## 2020-11-12 RX ADMIN — INSULIN HUMAN SCH UNIT: 100 INJECTION, SOLUTION PARENTERAL at 07:30

## 2020-11-12 RX ADMIN — CARVEDILOL SCH MG: 12.5 TABLET, FILM COATED ORAL at 21:54

## 2020-11-12 RX ADMIN — Medication PRN MG: at 01:03

## 2020-11-12 RX ADMIN — INSULIN HUMAN SCH UNIT: 100 INJECTION, SOLUTION PARENTERAL at 21:00

## 2020-11-12 RX ADMIN — TAZOBACTAM SODIUM AND PIPERACILLIN SODIUM SCH GM: 375; 3 INJECTION, SOLUTION INTRAVENOUS at 05:20

## 2020-11-12 RX ADMIN — CARVEDILOL SCH MG: 12.5 TABLET, FILM COATED ORAL at 17:24

## 2020-11-12 RX ADMIN — INSULIN HUMAN SCH UNIT: 100 INJECTION, SOLUTION PARENTERAL at 11:30

## 2020-11-12 RX ADMIN — TAZOBACTAM SODIUM AND PIPERACILLIN SODIUM SCH GM: 375; 3 INJECTION, SOLUTION INTRAVENOUS at 21:54

## 2020-11-12 RX ADMIN — AMLODIPINE BESYLATE SCH MG: 10 TABLET ORAL at 17:24

## 2020-11-12 RX ADMIN — FAMOTIDINE SCH MG: 10 INJECTION, SOLUTION INTRAVENOUS at 08:18

## 2020-11-12 RX ADMIN — TAZOBACTAM SODIUM AND PIPERACILLIN SODIUM SCH GM: 375; 3 INJECTION, SOLUTION INTRAVENOUS at 13:00

## 2020-11-12 NOTE — NUR
pt was very happy to see  , he expressed ayde  and need of prayer and healing , 
 provided prayer , hope building and blessings. pt expressed more cosmo and peace and 
hope.

chaplain Nita

## 2020-11-12 NOTE — NUR
Pt received back from PACU at this time. Pt is aox4, a little drowsy. Pt has 4 trochar sites 
to abdomen covered with bandaids. Pt can begin clear liquid diet and advance as tolerated.

## 2020-11-12 NOTE — NUR
Pt being transferred to OR at this time. 0 s/s of acute distress noted at time of transfer. 
Family at the bedside at this time. Denies any pain.

## 2020-11-12 NOTE — PROGRESS NOTE
DATE:  11/12/2020  

 

CONSULTANTS:  

1. Dr. Barr, Surgical team.

2. Dr. Romero, GI.

 

SUBJECTIVE:  The patient is seen in PACU, status post laparoscopic cholecystectomy.

Reports abdominal pain.  He denies any chest pain, shortness of breath, nausea, or

vomiting. 

 

OBJECTIVE:  VITAL SIGNS:  Temperature 97.5, pulse is 69, respirations 20, blood pressure

153/95, and pulse ox is 98% on room air. 

GENERAL:  No acute distress. 

HEENT:  Normocephalic and atraumatic. 

NECK:  Supple. 

LUNGS:  Clear to auscultation. 

CARDIOVASCULAR:  Regular rate and rhythm. 

ABDOMEN:  Soft.  Surgical site intact, tender to touch. 

NEURO:  Alert, awake, and oriented x3. 

MUSCULOSKELETAL:  Moves all extremities.  No edema. 

SKIN:  Dry. 

PSYCH:  Calm.

 

LABORATORY DATA:  No labs today.

 

IMAGING:  HIDA scan shows filling of the gallbladder excludes cystic duct obstruction or

acute cholecystitis and shows chronic cholecystitis or gallbladder dyskinesia. 

 

IMPRESSION AND PLAN:  

1. Abdominal pain due to gallstone pancreatitis.  Imaging showed chronic cholecystitis.

Status post laparoscopic cholecystectomy today.  Continue IV fluids and pain management

as needed. 

2. Left-sided abdominal pain.  GI has been consulted.  If pain does not resolve status

post cholecystectomy, may need further GI workup. 

3. Acute kidney injury.  Creatinine is improving with IV fluids.

4. Hypertension.  Stable.  We will continue hydralazine as needed.

5. Diabetes type 2.  Sliding scale insulin once he starts eating.

6. High cholesterol.  We will start statin tomorrow.

7. History of prostate cancer.  CT of abdomen is suggestive of urinary bladder

thickening suggestive of cystitis.  UA is negative.  We will continue with Zosyn. 

8. Gastrointestinal and deep venous thrombosis prophylaxis.  No anticoagulation due to

surgery today. 

 

PLAN:  To continue current treatment.  We will repeat labs in a.m. and further

recommendations per Surgical team and GI. 

 

 

Dictated by JUSTO Chamorro

 

______________________________

Atul Shelton MD

 

MY/MODL

D:  11/12/2020 13:52:42

T:  11/12/2020 14:24:46

Job #:  [id number]

## 2020-11-13 VITALS — SYSTOLIC BLOOD PRESSURE: 116 MMHG | DIASTOLIC BLOOD PRESSURE: 80 MMHG

## 2020-11-13 VITALS — SYSTOLIC BLOOD PRESSURE: 124 MMHG | DIASTOLIC BLOOD PRESSURE: 79 MMHG

## 2020-11-13 VITALS — DIASTOLIC BLOOD PRESSURE: 78 MMHG | SYSTOLIC BLOOD PRESSURE: 124 MMHG

## 2020-11-13 VITALS — DIASTOLIC BLOOD PRESSURE: 77 MMHG | SYSTOLIC BLOOD PRESSURE: 180 MMHG

## 2020-11-13 VITALS — DIASTOLIC BLOOD PRESSURE: 80 MMHG | SYSTOLIC BLOOD PRESSURE: 116 MMHG

## 2020-11-13 VITALS — SYSTOLIC BLOOD PRESSURE: 118 MMHG | DIASTOLIC BLOOD PRESSURE: 79 MMHG

## 2020-11-13 VITALS — SYSTOLIC BLOOD PRESSURE: 114 MMHG | DIASTOLIC BLOOD PRESSURE: 79 MMHG

## 2020-11-13 LAB
ANION GAP SERPL CALC-SCNC: 13.7 MMOL/L (ref 8–16)
BASOPHILS # BLD AUTO: 0 10*3/UL (ref 0–0.1)
BASOPHILS NFR BLD AUTO: 0.3 % (ref 0–1)
BUN SERPL-MCNC: 9 MG/DL (ref 7–26)
BUN/CREAT SERPL: 9 (ref 6–25)
CALCIUM SERPL-MCNC: 8.6 MG/DL (ref 8.4–10.2)
CHLORIDE SERPL-SCNC: 103 MMOL/L (ref 98–107)
CO2 SERPL-SCNC: 26 MMOL/L (ref 22–29)
DEPRECATED NEUTROPHILS # BLD AUTO: 6 10*3/UL (ref 2.1–6.9)
EGFRCR SERPLBLD CKD-EPI 2021: > 60 ML/MIN (ref 60–?)
EOSINOPHIL # BLD AUTO: 0.1 10*3/UL (ref 0–0.4)
EOSINOPHIL NFR BLD AUTO: 0.8 % (ref 0–6)
ERYTHROCYTE [DISTWIDTH] IN CORD BLOOD: 15.1 % (ref 11.7–14.4)
GLUCOSE SERPLBLD-MCNC: 125 MG/DL (ref 74–118)
HCT VFR BLD AUTO: 41.7 % (ref 38.2–49.6)
HGB BLD-MCNC: 13.3 G/DL (ref 14–18)
LYMPHOCYTES # BLD: 0.8 10*3/UL (ref 1–3.2)
LYMPHOCYTES NFR BLD AUTO: 10.2 % (ref 18–39.1)
MCH RBC QN AUTO: 29.2 PG (ref 28–32)
MCHC RBC AUTO-ENTMCNC: 31.9 G/DL (ref 31–35)
MCV RBC AUTO: 91.6 FL (ref 81–99)
MONOCYTES # BLD AUTO: 0.8 10*3/UL (ref 0.2–0.8)
MONOCYTES NFR BLD AUTO: 10.2 % (ref 4.4–11.3)
NEUTS SEG NFR BLD AUTO: 78.2 % (ref 38.7–80)
PLATELET # BLD AUTO: 191 X10E3/UL (ref 140–360)
POTASSIUM SERPL-SCNC: 4.7 MMOL/L (ref 3.5–5.1)
RBC # BLD AUTO: 4.55 X10E6/UL (ref 4.3–5.7)
SODIUM SERPL-SCNC: 138 MMOL/L (ref 136–145)

## 2020-11-13 RX ADMIN — SODIUM CHLORIDE SCH MLS/HR: 9 INJECTION, SOLUTION INTRAVENOUS at 09:05

## 2020-11-13 RX ADMIN — INSULIN HUMAN SCH UNIT: 100 INJECTION, SOLUTION PARENTERAL at 07:30

## 2020-11-13 RX ADMIN — SODIUM CHLORIDE PRN MG: 900 INJECTION INTRAVENOUS at 02:44

## 2020-11-13 RX ADMIN — TAZOBACTAM SODIUM AND PIPERACILLIN SODIUM SCH GM: 375; 3 INJECTION, SOLUTION INTRAVENOUS at 05:44

## 2020-11-13 RX ADMIN — AMLODIPINE BESYLATE SCH MG: 10 TABLET ORAL at 09:05

## 2020-11-13 RX ADMIN — TAZOBACTAM SODIUM AND PIPERACILLIN SODIUM SCH GM: 375; 3 INJECTION, SOLUTION INTRAVENOUS at 22:00

## 2020-11-13 RX ADMIN — INSULIN HUMAN SCH UNIT: 100 INJECTION, SOLUTION PARENTERAL at 11:30

## 2020-11-13 RX ADMIN — CARVEDILOL SCH MG: 12.5 TABLET, FILM COATED ORAL at 22:00

## 2020-11-13 RX ADMIN — CARVEDILOL SCH MG: 12.5 TABLET, FILM COATED ORAL at 14:32

## 2020-11-13 RX ADMIN — SODIUM CHLORIDE SCH MLS/HR: 9 INJECTION, SOLUTION INTRAVENOUS at 04:30

## 2020-11-13 RX ADMIN — CARVEDILOL SCH MG: 12.5 TABLET, FILM COATED ORAL at 05:45

## 2020-11-13 RX ADMIN — ATORVASTATIN CALCIUM SCH MG: 10 TABLET, FILM COATED ORAL at 20:35

## 2020-11-13 RX ADMIN — Medication PRN MG: at 02:44

## 2020-11-13 RX ADMIN — INSULIN HUMAN SCH UNIT: 100 INJECTION, SOLUTION PARENTERAL at 16:40

## 2020-11-13 RX ADMIN — FAMOTIDINE SCH MG: 10 INJECTION, SOLUTION INTRAVENOUS at 09:05

## 2020-11-13 RX ADMIN — Medication PRN MG: at 14:48

## 2020-11-13 RX ADMIN — SODIUM CHLORIDE PRN MG: 900 INJECTION INTRAVENOUS at 14:48

## 2020-11-13 RX ADMIN — INSULIN HUMAN SCH UNIT: 100 INJECTION, SOLUTION PARENTERAL at 21:00

## 2020-11-13 RX ADMIN — TAZOBACTAM SODIUM AND PIPERACILLIN SODIUM SCH GM: 375; 3 INJECTION, SOLUTION INTRAVENOUS at 14:32

## 2020-11-13 NOTE — PROGRESS NOTE
DATE:  11/13/2020  

 

CONSULTANTS:  

1. Dr. Barr, surgery.

2. Dr. Romero, GI.

 

SUBJECTIVE:  The patient is in the room with spouse at bedside.  Abdominal pain still

present.  He tolerated clear liquids, now on GI soft diet.  He denies any chest pain,

shortness of breath, nausea, or vomiting, but still with significant abdominal pain. 

 

OBJECTIVE:  VITAL SIGNS:  Temperature 98.6, pulse is 85, respirations 17, blood pressure

114/79, pulse ox is 96% on room air. 

GENERAL:  No acute distress. 

HEENT:  Normocephalic and atraumatic. 

NECK:  Supple. 

LUNGS:  Clear to auscultation. 

CARDIOVASCULAR:  Regular rate and rhythm. 

GI:  Soft and tender to touch.  Surgical site intact. 

NEURO:  Alert, awake, and oriented x3. 

MUSCULOSKELETAL:  Moves all extremities.  No edema. 

SKIN:  Dry and intact. 

PSYCH:  Calm.

 

LABORATORY DATA:  WBC 7.72, hemoglobin 13.3, hematocrit 41.7, platelets 191.  Sodium

138, potassium 4.7, BUN 9, creatinine 1.01, calcium 8.6. 

 

IMPRESSION:  

1. Abdominal pain due to gallstone pancreatitis status post lap cholecystectomy.

Continue IV fluids and pain management as needed.  GI has been consulted, if abdominal

pain persists, may need further GI workup. 

2. Acute kidney injury, improved with IV fluids.

3. Hypertension, stable.  Continue hydralazine as needed.

4. Diabetes type 2, sliding scale insulin.

5. High cholesterol, on statin.

6. History of prostate cancer, CT abdomen and pelvis suggestive of urinary bladder

thickening, suggestive of cystitis, but UA is negative.  We will continue on Zosyn for

now. 

7. GI and DVT prophylaxis.  No anticoagulation due to recent surgery.

 

PLAN:  Continue current treatment.  We will advance diet as tolerated.  Further

recommendations per GI and surgical team. 

 

 

Dictated by JUSTO Chamorro

 

______________________________

Atul Shelton MD

 

MY/MODL

D:  11/13/2020 15:16:27

T:  11/13/2020 17:54:34

Job #:  [id number]

## 2020-11-14 VITALS — DIASTOLIC BLOOD PRESSURE: 74 MMHG | SYSTOLIC BLOOD PRESSURE: 114 MMHG

## 2020-11-14 VITALS — SYSTOLIC BLOOD PRESSURE: 118 MMHG | DIASTOLIC BLOOD PRESSURE: 63 MMHG

## 2020-11-14 VITALS — DIASTOLIC BLOOD PRESSURE: 83 MMHG | SYSTOLIC BLOOD PRESSURE: 123 MMHG

## 2020-11-14 VITALS — DIASTOLIC BLOOD PRESSURE: 96 MMHG | SYSTOLIC BLOOD PRESSURE: 147 MMHG

## 2020-11-14 VITALS — SYSTOLIC BLOOD PRESSURE: 117 MMHG | DIASTOLIC BLOOD PRESSURE: 69 MMHG

## 2020-11-14 VITALS — DIASTOLIC BLOOD PRESSURE: 76 MMHG | SYSTOLIC BLOOD PRESSURE: 129 MMHG

## 2020-11-14 VITALS — SYSTOLIC BLOOD PRESSURE: 114 MMHG | DIASTOLIC BLOOD PRESSURE: 74 MMHG

## 2020-11-14 LAB
BASOPHILS # BLD AUTO: 0 10*3/UL (ref 0–0.1)
BASOPHILS NFR BLD AUTO: 0.2 % (ref 0–1)
DEPRECATED NEUTROPHILS # BLD AUTO: 3.6 10*3/UL (ref 2.1–6.9)
EOSINOPHIL # BLD AUTO: 0.3 10*3/UL (ref 0–0.4)
EOSINOPHIL NFR BLD AUTO: 6.7 % (ref 0–6)
ERYTHROCYTE [DISTWIDTH] IN CORD BLOOD: 15.5 % (ref 11.7–14.4)
HCT VFR BLD AUTO: 38.1 % (ref 38.2–49.6)
HGB BLD-MCNC: 11.6 G/DL (ref 14–18)
LYMPHOCYTES # BLD: 0.6 10*3/UL (ref 1–3.2)
LYMPHOCYTES NFR BLD AUTO: 11.7 % (ref 18–39.1)
MCH RBC QN AUTO: 28.3 PG (ref 28–32)
MCHC RBC AUTO-ENTMCNC: 30.4 G/DL (ref 31–35)
MCV RBC AUTO: 92.9 FL (ref 81–99)
MONOCYTES # BLD AUTO: 0.5 10*3/UL (ref 0.2–0.8)
MONOCYTES NFR BLD AUTO: 9.7 % (ref 4.4–11.3)
NEUTS SEG NFR BLD AUTO: 71.3 % (ref 38.7–80)
PLATELET # BLD AUTO: 160 X10E3/UL (ref 140–360)
RBC # BLD AUTO: 4.1 X10E6/UL (ref 4.3–5.7)

## 2020-11-14 RX ADMIN — Medication PRN MG: at 09:32

## 2020-11-14 RX ADMIN — CARVEDILOL SCH MG: 12.5 TABLET, FILM COATED ORAL at 06:18

## 2020-11-14 RX ADMIN — INSULIN HUMAN SCH UNIT: 100 INJECTION, SOLUTION PARENTERAL at 21:00

## 2020-11-14 RX ADMIN — SODIUM CHLORIDE SCH MLS/HR: 9 INJECTION, SOLUTION INTRAVENOUS at 02:29

## 2020-11-14 RX ADMIN — TAZOBACTAM SODIUM AND PIPERACILLIN SODIUM SCH GM: 375; 3 INJECTION, SOLUTION INTRAVENOUS at 23:10

## 2020-11-14 RX ADMIN — SODIUM CHLORIDE SCH MLS/HR: 9 INJECTION, SOLUTION INTRAVENOUS at 16:00

## 2020-11-14 RX ADMIN — ASPIRIN 81 MG CHEWABLE TABLET SCH MG: 81 TABLET CHEWABLE at 09:24

## 2020-11-14 RX ADMIN — TAZOBACTAM SODIUM AND PIPERACILLIN SODIUM SCH GM: 375; 3 INJECTION, SOLUTION INTRAVENOUS at 13:54

## 2020-11-14 RX ADMIN — INSULIN HUMAN SCH UNIT: 100 INJECTION, SOLUTION PARENTERAL at 16:04

## 2020-11-14 RX ADMIN — INSULIN HUMAN SCH UNIT: 100 INJECTION, SOLUTION PARENTERAL at 08:30

## 2020-11-14 RX ADMIN — FAMOTIDINE SCH MG: 10 INJECTION, SOLUTION INTRAVENOUS at 09:22

## 2020-11-14 RX ADMIN — TAZOBACTAM SODIUM AND PIPERACILLIN SODIUM SCH GM: 375; 3 INJECTION, SOLUTION INTRAVENOUS at 06:17

## 2020-11-14 RX ADMIN — INSULIN HUMAN SCH UNIT: 100 INJECTION, SOLUTION PARENTERAL at 12:00

## 2020-11-14 RX ADMIN — CARVEDILOL SCH MG: 12.5 TABLET, FILM COATED ORAL at 13:54

## 2020-11-14 RX ADMIN — CARVEDILOL SCH MG: 12.5 TABLET, FILM COATED ORAL at 23:11

## 2020-11-14 RX ADMIN — AMLODIPINE BESYLATE SCH MG: 10 TABLET ORAL at 09:25

## 2020-11-14 RX ADMIN — ATORVASTATIN CALCIUM SCH MG: 10 TABLET, FILM COATED ORAL at 20:37

## 2020-11-14 NOTE — NUR
BEDSIDE SHIFT REPORT RECEIVED FROM THE NIGHT SHIFT RN. EDUCATED PT ABOUT FALL PRECAUTIONS. 
PT VERBALIZED UNDERSTANDING. CALL LIGHT WITH IN EASY REACH. INSTRUCTED PT TO USE CALL LIGHT 
FOR ALL THE NEEDS.  BED IS LOW AND LOCKED. SIDE RAILS X2. BED ALARM IS ON. ALL SAFETY 
MEASURES IN PLACE.  PT DENIES NEEDS AT THIS TIME.

## 2020-11-14 NOTE — PROGRESS NOTE
DATE:  11/14/2020  

 

CONSULTANTS:  

1. Dr. Barr, Surgery.

2. Dr. Romero, GI.

 

SUBJECTIVE:  The patient is sitting in a recliner, complaining of abdominal pain and

lower quadrants and to the left side.  He is tolerating GI soft diet, but has not moved

his bowels.  He denies any chest pain, shortness of breath, nausea, or vomiting. 

 

OBJECTIVE:  VITAL SIGNS:  Temperature 98.3, pulse is 73, respirations 20, blood pressure

129/76, and pulse ox is 96% on room air. 

GENERAL:  No acute distress. 

HEENT:  Normocephalic and atraumatic. 

NECK:  Supple. 

LUNGS:  Clear to auscultation. 

CARDIOVASCULAR:  Regular rate and rhythm. 

GI:  Soft and nontender.  Surgical site intact. 

NEURO:  Alert, awake, and oriented x3. 

MUSCULOSKELETAL:  Moves all extremities.  No edema. 

SKIN:  Dry and intact. 

PSYCH:  Calm.

 

LABORATORY DATA:  WBC 5.05, hemoglobin 11.6, hematocrit 38.1, and platelet 160.  Blood

sugar within normal limits. 

 

IMPRESSION:  

1. Gallstone pancreatitis, status post laparoscopic cholecystectomy.  Imaging showed

chronic cholecystitis.  Continue pain management as needed and diet advanced as

tolerated. 

2. Left-sided abdominal pain.  GI on the case, was given laxative today to help with his

bowel movement and see if his pain improves after bowel movement. 

3. Acute kidney injury, improved with IV fluids.

4. Hypertension.  Continue hydralazine as needed.

5. Diabetes type 2.  Sliding scale insulin.

6. High cholesterol, on statin.

7. History of prostate cancer.  CT abdomen noted with possible cystitis, but UA is

negative.  We will continue on Zosyn. 

8. Gastrointestinal and deep venous thrombosis prophylaxis.  No anticoagulation due to

recent surgery. 

 

PLAN:  To continue current treatment.  Tolerating soft diet.  Further recommendations

per GI.  Discussed with GI, who is ordering laxatives and if the pain does not improve

after bowel movement, may consider further workup. 

 

 

Dictated by JUSTO Chamorro

 

______________________________

Atul Shelton MD

 

MY/MODL

D:  11/14/2020 18:20:45

T:  11/14/2020 19:47:47

Job #:  [id number]

## 2020-11-14 NOTE — NUR
Received patient awake, walking around, no BM yet but passing gas per patient, instructed 
patient to inform RN/PCT if + BM, will continue to monitor closely

## 2020-11-15 VITALS — SYSTOLIC BLOOD PRESSURE: 129 MMHG | DIASTOLIC BLOOD PRESSURE: 76 MMHG

## 2020-11-15 VITALS — SYSTOLIC BLOOD PRESSURE: 150 MMHG | DIASTOLIC BLOOD PRESSURE: 87 MMHG

## 2020-11-15 VITALS — DIASTOLIC BLOOD PRESSURE: 75 MMHG | SYSTOLIC BLOOD PRESSURE: 122 MMHG

## 2020-11-15 VITALS — SYSTOLIC BLOOD PRESSURE: 131 MMHG | DIASTOLIC BLOOD PRESSURE: 88 MMHG

## 2020-11-15 VITALS — DIASTOLIC BLOOD PRESSURE: 92 MMHG | SYSTOLIC BLOOD PRESSURE: 145 MMHG

## 2020-11-15 VITALS — DIASTOLIC BLOOD PRESSURE: 78 MMHG | SYSTOLIC BLOOD PRESSURE: 139 MMHG

## 2020-11-15 VITALS — SYSTOLIC BLOOD PRESSURE: 146 MMHG | DIASTOLIC BLOOD PRESSURE: 86 MMHG

## 2020-11-15 VITALS — SYSTOLIC BLOOD PRESSURE: 139 MMHG | DIASTOLIC BLOOD PRESSURE: 78 MMHG

## 2020-11-15 VITALS — DIASTOLIC BLOOD PRESSURE: 86 MMHG | SYSTOLIC BLOOD PRESSURE: 146 MMHG

## 2020-11-15 LAB
ANION GAP SERPL CALC-SCNC: 12 MMOL/L (ref 8–16)
BASOPHILS # BLD AUTO: 0 10*3/UL (ref 0–0.1)
BASOPHILS NFR BLD AUTO: 0.2 % (ref 0–1)
BUN SERPL-MCNC: 9 MG/DL (ref 7–26)
BUN/CREAT SERPL: 8 (ref 6–25)
CALCIUM SERPL-MCNC: 8.9 MG/DL (ref 8.4–10.2)
CHLORIDE SERPL-SCNC: 104 MMOL/L (ref 98–107)
CO2 SERPL-SCNC: 29 MMOL/L (ref 22–29)
DEPRECATED NEUTROPHILS # BLD AUTO: 3.1 10*3/UL (ref 2.1–6.9)
EGFRCR SERPLBLD CKD-EPI 2021: > 60 ML/MIN (ref 60–?)
EOSINOPHIL # BLD AUTO: 0.3 10*3/UL (ref 0–0.4)
EOSINOPHIL NFR BLD AUTO: 6.5 % (ref 0–6)
ERYTHROCYTE [DISTWIDTH] IN CORD BLOOD: 15 % (ref 11.7–14.4)
GLUCOSE SERPLBLD-MCNC: 242 MG/DL (ref 74–118)
HCT VFR BLD AUTO: 38.8 % (ref 38.2–49.6)
HGB BLD-MCNC: 12.2 G/DL (ref 14–18)
LYMPHOCYTES # BLD: 0.7 10*3/UL (ref 1–3.2)
LYMPHOCYTES NFR BLD AUTO: 15.9 % (ref 18–39.1)
MCH RBC QN AUTO: 28.9 PG (ref 28–32)
MCHC RBC AUTO-ENTMCNC: 31.4 G/DL (ref 31–35)
MCV RBC AUTO: 91.9 FL (ref 81–99)
MONOCYTES # BLD AUTO: 0.5 10*3/UL (ref 0.2–0.8)
MONOCYTES NFR BLD AUTO: 10.8 % (ref 4.4–11.3)
NEUTS SEG NFR BLD AUTO: 66.2 % (ref 38.7–80)
PLATELET # BLD AUTO: 170 X10E3/UL (ref 140–360)
POTASSIUM SERPL-SCNC: 4 MMOL/L (ref 3.5–5.1)
RBC # BLD AUTO: 4.22 X10E6/UL (ref 4.3–5.7)
SODIUM SERPL-SCNC: 141 MMOL/L (ref 136–145)

## 2020-11-15 RX ADMIN — INSULIN HUMAN SCH UNIT: 100 INJECTION, SOLUTION PARENTERAL at 12:32

## 2020-11-15 RX ADMIN — Medication PRN MG: at 19:43

## 2020-11-15 RX ADMIN — INSULIN HUMAN SCH UNIT: 100 INJECTION, SOLUTION PARENTERAL at 20:38

## 2020-11-15 RX ADMIN — CARVEDILOL SCH MG: 12.5 TABLET, FILM COATED ORAL at 05:50

## 2020-11-15 RX ADMIN — INSULIN HUMAN SCH UNIT: 100 INJECTION, SOLUTION PARENTERAL at 17:16

## 2020-11-15 RX ADMIN — SODIUM CHLORIDE SCH MG: 900 INJECTION INTRAVENOUS at 23:22

## 2020-11-15 RX ADMIN — FAMOTIDINE SCH MG: 10 INJECTION, SOLUTION INTRAVENOUS at 09:03

## 2020-11-15 RX ADMIN — CARVEDILOL SCH MG: 12.5 TABLET, FILM COATED ORAL at 21:12

## 2020-11-15 RX ADMIN — CARVEDILOL SCH MG: 12.5 TABLET, FILM COATED ORAL at 14:14

## 2020-11-15 RX ADMIN — TAZOBACTAM SODIUM AND PIPERACILLIN SODIUM SCH GM: 375; 3 INJECTION, SOLUTION INTRAVENOUS at 21:12

## 2020-11-15 RX ADMIN — INSULIN HUMAN SCH UNIT: 100 INJECTION, SOLUTION PARENTERAL at 09:19

## 2020-11-15 RX ADMIN — TAZOBACTAM SODIUM AND PIPERACILLIN SODIUM SCH GM: 375; 3 INJECTION, SOLUTION INTRAVENOUS at 05:49

## 2020-11-15 RX ADMIN — AMLODIPINE BESYLATE SCH MG: 10 TABLET ORAL at 09:03

## 2020-11-15 RX ADMIN — ASPIRIN 81 MG CHEWABLE TABLET SCH MG: 81 TABLET CHEWABLE at 09:03

## 2020-11-15 RX ADMIN — ATORVASTATIN CALCIUM SCH MG: 10 TABLET, FILM COATED ORAL at 20:10

## 2020-11-15 RX ADMIN — TAZOBACTAM SODIUM AND PIPERACILLIN SODIUM SCH GM: 375; 3 INJECTION, SOLUTION INTRAVENOUS at 14:14

## 2020-11-15 NOTE — DIAGNOSTIC IMAGING REPORT
EXAM: Abdomen Radiograph 1  View(s)

INDICATION:   Abdominal pain.

COMPARISON: CT of the abdomen/pelvis on 11/9/2020.



FINDINGS:



Nonobstructive bowel gas pattern.



No abnormal calcification.



No evidence of pneumoperitoneum.



Status post cholecystectomy.



The bones are within normal limits for age. 



IMPRESSION:



No acute abdominal radiographic abnormality. Normal bowel gas pattern.



Signed by: Chandler Delgadillo MD on 11/15/2020 1:20 PM

## 2020-11-15 NOTE — PROGRESS NOTE
DATE:  11/15/2020  

 

CONSULTANTS:  

1. Dk Barr MD, Surgery.

2. Dr. Romero, GI.

 

SUBJECTIVE:  The patient is sitting up in a chair.  Reports he is feeling drained and

lethargic, had a bowel movement last night, but still with abdominal pain.  He denies

any chest pain, shortness of breath, nausea, or vomiting. 

 

OBJECTIVE:  VITAL SIGNS:  Temperature 99.4, pulse is 70, respirations 20, blood pressure

150/87, pulse ox is 94% on room air. 

GENERAL:  Fatigue. 

HEENT:  Normocephalic, atraumatic. 

NECK:  Supple. 

LUNGS:  Clear to auscultation. 

CARDIOVASCULAR:  Regular rate and rhythm. 

GI:  Soft and nontender with surgical sites intact. 

NEUROLOGIC:  Alert, awake, oriented x3. 

MUSCULOSKELETAL:  Moves all extremities.  No edema. 

SKIN:  Dry and intact. 

PSYCH:  Calm.

 

LABORATORY DATA:  WBC 4.64, hemoglobin 12.2, hematocrit 38.8, platelets 170.  Sodium

141, potassium 4.0, CO2 29, BUN is 9, creatinine 1.07, estimated GFR is greater than 60.

 Blood glucose 242, calcium 8.9. 

 

IMAGING:  Abdominal x-ray shows no acute abdominal radiographic abnormalities.  Normal

bowel gas pattern. 

 

IMPRESSION:  

1. Gallstone pancreatitis.  Status post laparoscopic cholecystectomy.  We will continue

with pain management and advance diet as tolerated. 

2. Left side and lower quadrant abdominal pain.  GI has been consulted.  KUB today shows

normal gas pattern. 

3. Acute kidney injury.  Improved with IV fluids.

4. Hypertension.  Continue hydralazine as needed.

5. Diabetes type 2.  Sliding scale insulin.

6. High cholesterol.  On statin.

7. History of prostate cancer.  CT abdomen noted.  We will continue with Zosyn.

8. GI and DVT prophylaxis.  No chemical anticoagulation due to recent surgery.

 

PLAN:  Continue IV antibiotics, still feeling sick and abdominal pain.  Repeat labs and

KUB.  Further recommendations per GI. 

 

 

Dictated by JUSTO Chamorro

 

______________________________

Atul Shelton MD

 

MY/MODL

D:  11/15/2020 20:01:26

T:  11/15/2020 21:37:33

Job #:  [id number]

## 2020-11-16 VITALS — SYSTOLIC BLOOD PRESSURE: 108 MMHG | DIASTOLIC BLOOD PRESSURE: 68 MMHG

## 2020-11-16 VITALS — DIASTOLIC BLOOD PRESSURE: 70 MMHG | SYSTOLIC BLOOD PRESSURE: 135 MMHG

## 2020-11-16 VITALS — SYSTOLIC BLOOD PRESSURE: 138 MMHG | DIASTOLIC BLOOD PRESSURE: 94 MMHG

## 2020-11-16 VITALS — SYSTOLIC BLOOD PRESSURE: 150 MMHG | DIASTOLIC BLOOD PRESSURE: 84 MMHG

## 2020-11-16 RX ADMIN — Medication PRN MG: at 05:40

## 2020-11-16 RX ADMIN — SODIUM CHLORIDE SCH MG: 900 INJECTION INTRAVENOUS at 08:22

## 2020-11-16 RX ADMIN — INSULIN HUMAN SCH UNIT: 100 INJECTION, SOLUTION PARENTERAL at 11:32

## 2020-11-16 RX ADMIN — CARVEDILOL SCH MG: 12.5 TABLET, FILM COATED ORAL at 05:50

## 2020-11-16 RX ADMIN — INSULIN HUMAN SCH UNIT: 100 INJECTION, SOLUTION PARENTERAL at 08:22

## 2020-11-16 RX ADMIN — AMLODIPINE BESYLATE SCH MG: 10 TABLET ORAL at 08:22

## 2020-11-16 RX ADMIN — ASPIRIN 81 MG CHEWABLE TABLET SCH MG: 81 TABLET CHEWABLE at 08:20

## 2020-11-16 RX ADMIN — TAZOBACTAM SODIUM AND PIPERACILLIN SODIUM SCH GM: 375; 3 INJECTION, SOLUTION INTRAVENOUS at 05:50

## 2020-11-16 NOTE — DISCHARGE SUMMARY
PCP:  Dr. Wilder Almonte at [facility].

 

FINAL DISCHARGE DIAGNOSES:  

1. Gallstone pancreatitis, status post laparoscopic cholecystectomy.

2. Left lower quadrant abdominal pain.

3. Acute kidney injury.

4. Hypertension.

5. Diabetes type 2.

6. High cholesterol.

7. History of prostate cancer.

 

CONSULTANTS:  

1. Dr. Barr, Surgery.

2. Dr. Romero with GI.

 

PROCEDURES:  He underwent laparoscopic cholecystectomy.

 

HISTORY:  Per HPI.

 

HOSPITAL COURSE:  This is a 70-year-old male, who presented to the ER with complaints of

abdominal pain.  Labs showed elevation in lipase.  Imaging showed chronic cholecystitis.

 He was kept n.p.o., given IV fluids, and surgical team consulted.  He underwent

laparoscopic cholecystectomy, but abdominal pain continued to persist.  So, GI was

consulted.  Laxatives were given.  No need for ERCP or further intervention at this time

as the symptoms are most likely postsurgical abdominal pain.  Today, he is feeling much

better, tolerating diet, we will discharge home to follow up with Dr. Barr, PCP, and GI

if lower quadrant pain continues to persist.  CT abdomen was noted to have bladder

thickness, UA was negative for UTI.  He was treated with Zosyn empirically.  He remained

afebrile, no dysuria or hematuria. 

 

PHYSICAL EXAMINATION:

VITAL SIGNS:  Temperature 98.9, pulse is 57, respirations 20, blood pressure 138/94,

pulse ox is 98% on room air. 

GENERAL:  No acute distress. 

HEENT:  Normocephalic and atraumatic. 

NECK:  Supple. 

LUNGS:  Clear to auscultation. 

CARDIOVASCULAR:  Regular rate and rhythm. 

GI:  Soft and mild tenderness in the lower quadrant. 

NEUROLOGIC:  Alert, awake, and oriented x3. 

MUSCULOSKELETAL:  Moves all extremities.  No edema. 

SKIN:  Dry and intact. 

PSYCH:  Calm.

CONDITION AT DISCHARGE:  Improved and stable.

 

DISCHARGE MEDICATIONS:  Please see medication reconciliation list.

 

FOLLOWUP:  Follow up with Dr. Barr in 1 week, with Dr. Romero in 2 to 3 weeks, and PCP in

1 to 2 weeks. 

 

TIME SPENT:  Total time of discharge is 32 minutes.

 

 

Dictated by JUSTO Chamorro

 

______________________________

Atul Shelton MD

 

MY/MODL

D:  11/16/2020 19:52:13

T:  11/16/2020 20:56:40

Job #:  [id number]

 

cc:            Wilder Almonte MD

## 2020-11-16 NOTE — NUR
Discharge instructions given to the patient and his wife, they verbalized understanding. IV 
to the left ac was removed with tip intact.

## 2020-11-17 NOTE — OPERATIVE REPORT
DATE OF PROCEDURE:  11/11/2020

 

SURGEON:  Dk Barr MD

 

PREOPERATIVE DIAGNOSIS:  Cholecystitis.

 

POSTOPERATIVE DIAGNOSIS:  Cholecystitis.

 

OPERATIVE PROCEDURE:  Laparoscopic cholecystectomy.

 

ANESTHESIA:  General.

 

INDICATIONS:  A 70-year-old male with abdominal pain with HIDA scan showing poor

ejection fraction of the gallbladder with cholecystitis.  The patient consented for

laparoscopic cholecystectomy.  Attendant risks have been discussed. 

 

PROCEDURE FINDING:  Chronic cholecystitis.

 

DESCRIPTION OF PROCEDURE:  The patient was brought to the OR, intubated.  The abdomen

was prepped and draped in sterile fashion.  Infraumbilical incision was made and a 10 mm

port inserted.  Insufflation begun under direct vision.  Other port sites placed in the

midepigastric and right upper quadrant.  Gallbladder chronically inflamed and distended.

 Fundus retracted in cephalad direction.  Neck of the gallbladder retracted laterally.

With blunt and sharp dissection, cystic artery was isolated, triple clipped and divided.

 The cystic duct also isolated and junction of the common bile duct was noted before

triple clipping the cystic duct and divided between clips.  The gallbladder detached

from the liver with cautery and taken out through the umbilical port site.  Operative

field was irrigated.  Hemostasis achieved.  Ports removed under direct vision.  Fascia

was closed with 0 Vicryl.  Skin was closed with subcuticular stitch.  The patient was

extubated and transported to recovery room.  Blood loss was 10 mL. 

 

 

 

 

______________________________

Dk Barr MD

 

DNL/MODL

D:  11/17/2020 12:03:17

T:  11/17/2020 13:21:15

Job #:  [id number]

## 2021-12-30 ENCOUNTER — HOSPITAL ENCOUNTER (INPATIENT)
Dept: HOSPITAL 88 - ER | Age: 71
LOS: 5 days | Discharge: HOME | DRG: 205 | End: 2022-01-04
Attending: INTERNAL MEDICINE | Admitting: INTERNAL MEDICINE
Payer: MEDICARE

## 2021-12-30 VITALS — SYSTOLIC BLOOD PRESSURE: 136 MMHG | DIASTOLIC BLOOD PRESSURE: 74 MMHG

## 2021-12-30 VITALS — HEIGHT: 64 IN | BODY MASS INDEX: 41.83 KG/M2 | WEIGHT: 245 LBS

## 2021-12-30 DIAGNOSIS — G47.33: ICD-10-CM

## 2021-12-30 DIAGNOSIS — Z20.822: ICD-10-CM

## 2021-12-30 DIAGNOSIS — J96.02: ICD-10-CM

## 2021-12-30 DIAGNOSIS — E78.5: ICD-10-CM

## 2021-12-30 DIAGNOSIS — N17.9: ICD-10-CM

## 2021-12-30 DIAGNOSIS — G93.41: ICD-10-CM

## 2021-12-30 DIAGNOSIS — Z85.46: ICD-10-CM

## 2021-12-30 DIAGNOSIS — E87.6: ICD-10-CM

## 2021-12-30 DIAGNOSIS — E87.1: ICD-10-CM

## 2021-12-30 DIAGNOSIS — J47.1: ICD-10-CM

## 2021-12-30 DIAGNOSIS — Z79.84: ICD-10-CM

## 2021-12-30 DIAGNOSIS — E66.2: Primary | ICD-10-CM

## 2021-12-30 DIAGNOSIS — Z79.82: ICD-10-CM

## 2021-12-30 DIAGNOSIS — E11.9: ICD-10-CM

## 2021-12-30 LAB
ALBUMIN SERPL-MCNC: 3.4 G/DL (ref 3.5–5)
ALBUMIN/GLOB SERPL: 0.9 {RATIO} (ref 0.8–2)
ALP SERPL-CCNC: 81 IU/L (ref 40–150)
ALT SERPL-CCNC: 21 IU/L (ref 0–55)
ANION GAP SERPL CALC-SCNC: 14.9 MMOL/L (ref 8–16)
BASOPHILS # BLD AUTO: 0 10*3/UL (ref 0–0.1)
BASOPHILS NFR BLD AUTO: 0.2 % (ref 0–1)
BUN SERPL-MCNC: 16 MG/DL (ref 7–26)
BUN/CREAT SERPL: 19 (ref 6–25)
CALCIUM SERPL-MCNC: 8.6 MG/DL (ref 8.4–10.2)
CHLORIDE SERPL-SCNC: 80 MMOL/L (ref 98–107)
CK MB SERPL-MCNC: 5.6 NG/ML (ref 0–5)
CK MB SERPL-MCNC: 5.6 NG/ML (ref 0–5)
CK SERPL-CCNC: 577 IU/L (ref 30–200)
CK SERPL-CCNC: 641 IU/L (ref 30–200)
CO2 SERPL-SCNC: 34 MMOL/L (ref 22–29)
DEPRECATED APTT PLAS QN: 32.3 SECONDS (ref 23.8–35.5)
DEPRECATED INR PLAS: 0.9
DEPRECATED NEUTROPHILS # BLD AUTO: 4.9 10*3/UL (ref 2.1–6.9)
EGFRCR SERPLBLD CKD-EPI 2021: 88 ML/MIN (ref 60–?)
EOSINOPHIL # BLD AUTO: 0.4 10*3/UL (ref 0–0.4)
EOSINOPHIL NFR BLD AUTO: 5.3 % (ref 0–6)
ERYTHROCYTE [DISTWIDTH] IN CORD BLOOD: 13.3 % (ref 11.7–14.4)
GLOBULIN PLAS-MCNC: 3.9 G/DL (ref 2.3–3.5)
GLUCOSE SERPLBLD-MCNC: 123 MG/DL (ref 74–118)
HCT VFR BLD AUTO: 35.9 % (ref 38.2–49.6)
HGB BLD-MCNC: 12.3 G/DL (ref 14–18)
LYMPHOCYTES # BLD: 0.7 10*3/UL (ref 1–3.2)
LYMPHOCYTES NFR BLD AUTO: 11 % (ref 18–39.1)
MAGNESIUM SERPL-MCNC: 1.5 MG/DL (ref 1.3–2.1)
MCH RBC QN AUTO: 29.6 PG (ref 28–32)
MCHC RBC AUTO-ENTMCNC: 34.3 G/DL (ref 31–35)
MCV RBC AUTO: 86.3 FL (ref 81–99)
MONOCYTES # BLD AUTO: 0.7 10*3/UL (ref 0.2–0.8)
MONOCYTES NFR BLD AUTO: 10.1 % (ref 4.4–11.3)
NEUTS SEG NFR BLD AUTO: 73.1 % (ref 38.7–80)
PLATELET # BLD AUTO: 244 X10E3/UL (ref 140–360)
POTASSIUM SERPL-SCNC: 2.9 MMOL/L (ref 3.5–5.1)
PROTHROMBIN TIME: 12.9 SECONDS (ref 11.9–14.5)
RBC # BLD AUTO: 4.16 X10E6/UL (ref 4.3–5.7)
SODIUM SERPL-SCNC: 126 MMOL/L (ref 136–145)

## 2021-12-30 PROCEDURE — 36415 COLL VENOUS BLD VENIPUNCTURE: CPT

## 2021-12-30 PROCEDURE — 97139 UNLISTED THERAPEUTIC PX: CPT

## 2021-12-30 PROCEDURE — 84484 ASSAY OF TROPONIN QUANT: CPT

## 2021-12-30 PROCEDURE — 94667 MNPJ CHEST WALL 1ST: CPT

## 2021-12-30 PROCEDURE — 82553 CREATINE MB FRACTION: CPT

## 2021-12-30 PROCEDURE — 94669 MECHANICAL CHEST WALL OSCILL: CPT

## 2021-12-30 PROCEDURE — 87205 SMEAR GRAM STAIN: CPT

## 2021-12-30 PROCEDURE — 85730 THROMBOPLASTIN TIME PARTIAL: CPT

## 2021-12-30 PROCEDURE — 82550 ASSAY OF CK (CPK): CPT

## 2021-12-30 PROCEDURE — 87206 SMEAR FLUORESCENT/ACID STAI: CPT

## 2021-12-30 PROCEDURE — 87116 MYCOBACTERIA CULTURE: CPT

## 2021-12-30 PROCEDURE — 82805 BLOOD GASES W/O2 SATURATION: CPT

## 2021-12-30 PROCEDURE — 71045 X-RAY EXAM CHEST 1 VIEW: CPT

## 2021-12-30 PROCEDURE — 87070 CULTURE OTHR SPECIMN AEROBIC: CPT

## 2021-12-30 PROCEDURE — 99284 EMERGENCY DEPT VISIT MOD MDM: CPT

## 2021-12-30 PROCEDURE — 36600 WITHDRAWAL OF ARTERIAL BLOOD: CPT

## 2021-12-30 PROCEDURE — 93306 TTE W/DOPPLER COMPLETE: CPT

## 2021-12-30 PROCEDURE — 93005 ELECTROCARDIOGRAM TRACING: CPT

## 2021-12-30 PROCEDURE — 85025 COMPLETE CBC W/AUTO DIFF WBC: CPT

## 2021-12-30 PROCEDURE — 83735 ASSAY OF MAGNESIUM: CPT

## 2021-12-30 PROCEDURE — 80053 COMPREHEN METABOLIC PANEL: CPT

## 2021-12-30 PROCEDURE — 96372 THER/PROPH/DIAG INJ SC/IM: CPT

## 2021-12-30 PROCEDURE — 80048 BASIC METABOLIC PNL TOTAL CA: CPT

## 2021-12-30 PROCEDURE — 82948 REAGENT STRIP/BLOOD GLUCOSE: CPT

## 2021-12-30 PROCEDURE — 94668 MNPJ CHEST WALL SBSQ: CPT

## 2021-12-30 PROCEDURE — 83880 ASSAY OF NATRIURETIC PEPTIDE: CPT

## 2021-12-30 PROCEDURE — 36569 INSJ PICC 5 YR+ W/O IMAGING: CPT

## 2021-12-30 PROCEDURE — 85610 PROTHROMBIN TIME: CPT

## 2021-12-30 PROCEDURE — 71250 CT THORAX DX C-: CPT

## 2021-12-30 PROCEDURE — 84443 ASSAY THYROID STIM HORMONE: CPT

## 2021-12-30 PROCEDURE — 94799 UNLISTED PULMONARY SVC/PX: CPT

## 2021-12-30 PROCEDURE — 80061 LIPID PANEL: CPT

## 2021-12-30 RX ADMIN — INSULIN LISPRO SCH UNIT: 100 INJECTION, SOLUTION INTRAVENOUS; SUBCUTANEOUS at 22:05

## 2021-12-30 RX ADMIN — CARVEDILOL SCH MG: 12.5 TABLET, FILM COATED ORAL at 23:35

## 2021-12-31 VITALS — SYSTOLIC BLOOD PRESSURE: 94 MMHG | DIASTOLIC BLOOD PRESSURE: 63 MMHG

## 2021-12-31 VITALS — DIASTOLIC BLOOD PRESSURE: 67 MMHG | SYSTOLIC BLOOD PRESSURE: 93 MMHG

## 2021-12-31 VITALS — DIASTOLIC BLOOD PRESSURE: 66 MMHG | SYSTOLIC BLOOD PRESSURE: 97 MMHG

## 2021-12-31 VITALS — SYSTOLIC BLOOD PRESSURE: 108 MMHG | DIASTOLIC BLOOD PRESSURE: 67 MMHG

## 2021-12-31 VITALS — SYSTOLIC BLOOD PRESSURE: 98 MMHG | DIASTOLIC BLOOD PRESSURE: 59 MMHG

## 2021-12-31 VITALS — SYSTOLIC BLOOD PRESSURE: 94 MMHG | DIASTOLIC BLOOD PRESSURE: 58 MMHG

## 2021-12-31 VITALS — DIASTOLIC BLOOD PRESSURE: 77 MMHG | SYSTOLIC BLOOD PRESSURE: 136 MMHG

## 2021-12-31 VITALS — DIASTOLIC BLOOD PRESSURE: 55 MMHG | SYSTOLIC BLOOD PRESSURE: 92 MMHG

## 2021-12-31 VITALS — SYSTOLIC BLOOD PRESSURE: 106 MMHG | DIASTOLIC BLOOD PRESSURE: 50 MMHG

## 2021-12-31 VITALS — DIASTOLIC BLOOD PRESSURE: 56 MMHG | SYSTOLIC BLOOD PRESSURE: 89 MMHG

## 2021-12-31 VITALS — SYSTOLIC BLOOD PRESSURE: 121 MMHG | DIASTOLIC BLOOD PRESSURE: 69 MMHG

## 2021-12-31 LAB
ALBUMIN SERPL-MCNC: 3.5 G/DL (ref 3.5–5)
ALBUMIN/GLOB SERPL: 0.8 {RATIO} (ref 0.8–2)
ALP SERPL-CCNC: 83 IU/L (ref 40–150)
ALT SERPL-CCNC: 25 IU/L (ref 0–55)
ANION GAP SERPL CALC-SCNC: 14.5 MMOL/L (ref 8–16)
BASOPHILS # BLD AUTO: 0 10*3/UL (ref 0–0.1)
BASOPHILS NFR BLD AUTO: 0.3 % (ref 0–1)
BUN SERPL-MCNC: 18 MG/DL (ref 7–26)
BUN/CREAT SERPL: 16 (ref 6–25)
CALCIUM SERPL-MCNC: 9.1 MG/DL (ref 8.4–10.2)
CHLORIDE SERPL-SCNC: 82 MMOL/L (ref 98–107)
CHOLEST SERPL-MCNC: 126 MD/DL (ref 0–199)
CHOLEST/HDLC SERPL: 1.9 {RATIO} (ref 3.9–4.7)
CK MB SERPL-MCNC: 7 NG/ML (ref 0–5)
CK SERPL-CCNC: 588 IU/L (ref 30–200)
CO2 BLDCOA CALC-SCNC: 41 MMOL/L
CO2 BLDCOA CALC-SCNC: 42 MMOL/L
CO2 BLDCOA CALC-SCNC: 43 MMOL/L
CO2 SERPL-SCNC: 36 MMOL/L (ref 22–29)
DEPRECATED NEUTROPHILS # BLD AUTO: 5.6 10*3/UL (ref 2.1–6.9)
EGFRCR SERPLBLD CKD-EPI 2021: 66 ML/MIN (ref 60–?)
EOSINOPHIL # BLD AUTO: 0.2 10*3/UL (ref 0–0.4)
EOSINOPHIL NFR BLD AUTO: 3.1 % (ref 0–6)
ERYTHROCYTE [DISTWIDTH] IN CORD BLOOD: 13.2 % (ref 11.7–14.4)
GLOBULIN PLAS-MCNC: 4.2 G/DL (ref 2.3–3.5)
GLUCOSE SERPLBLD-MCNC: 200 MG/DL (ref 74–118)
HCO3 BLDA-SCNC: 39 MMOL/L (ref 22–26)
HCO3 BLDA-SCNC: 40 MMOL/L (ref 22–26)
HCO3 BLDA-SCNC: 41 MMOL/L (ref 22–26)
HCT VFR BLD AUTO: 37 % (ref 38.2–49.6)
HDLC SERPL-MSCNC: 67 MG/DL (ref 40–60)
HGB BLD-MCNC: 12 G/DL (ref 14–18)
LDLC SERPL CALC-MCNC: 42 MG/DL (ref 60–130)
LYMPHOCYTES # BLD: 0.9 10*3/UL (ref 1–3.2)
LYMPHOCYTES NFR BLD AUTO: 11.9 % (ref 18–39.1)
MAGNESIUM SERPL-MCNC: 1.7 MG/DL (ref 1.3–2.1)
MCH RBC QN AUTO: 29.1 PG (ref 28–32)
MCHC RBC AUTO-ENTMCNC: 32.4 G/DL (ref 31–35)
MCV RBC AUTO: 89.8 FL (ref 81–99)
MONOCYTES # BLD AUTO: 0.7 10*3/UL (ref 0.2–0.8)
MONOCYTES NFR BLD AUTO: 9.4 % (ref 4.4–11.3)
NEUTS SEG NFR BLD AUTO: 74.9 % (ref 38.7–80)
PCO2 BLDA: 69 MMHG (ref 80–105)
PCO2 BLDA: 81 MMHG (ref 80–105)
PCO2 BLDA: 86 MMHG (ref 35–45)
PCO2 BLDA: 87 MMHG (ref 35–45)
PCO2 BLDA: 97 MMHG (ref 35–45)
PCO2 BLDA: 99 MMHG (ref 80–105)
PH BLDA: 7.23 [PH] (ref 7.35–7.45)
PH BLDA: 7.26 [PH] (ref 7.35–7.45)
PH BLDA: 7.27 [PH] (ref 7.35–7.45)
PLATELET # BLD AUTO: 246 X10E3/UL (ref 140–360)
POTASSIUM SERPL-SCNC: 2.5 MMOL/L (ref 3.5–5.1)
RBC # BLD AUTO: 4.12 X10E6/UL (ref 4.3–5.7)
SAO2 % BLDA: 89 % (ref 95–98)
SAO2 % BLDA: 92 % (ref 95–98)
SAO2 % BLDA: 96 % (ref 95–98)
SODIUM SERPL-SCNC: 130 MMOL/L (ref 136–145)
TRIGL SERPL-MCNC: 83 MG/DL (ref 0–149)

## 2021-12-31 PROCEDURE — 5A09357 ASSISTANCE WITH RESPIRATORY VENTILATION, LESS THAN 24 CONSECUTIVE HOURS, CONTINUOUS POSITIVE AIRWAY PRESSURE: ICD-10-PCS | Performed by: INTERNAL MEDICINE

## 2021-12-31 RX ADMIN — Medication SCH MG: at 18:45

## 2021-12-31 RX ADMIN — Medication SCH MG: at 15:00

## 2021-12-31 RX ADMIN — CARVEDILOL SCH MG: 12.5 TABLET, FILM COATED ORAL at 12:45

## 2021-12-31 RX ADMIN — IPRATROPIUM BROMIDE AND ALBUTEROL SULFATE SCH ML: .5; 2.5 SOLUTION RESPIRATORY (INHALATION) at 14:30

## 2021-12-31 RX ADMIN — Medication SCH MG: at 09:01

## 2021-12-31 RX ADMIN — INSULIN LISPRO SCH UNIT: 100 INJECTION, SOLUTION INTRAVENOUS; SUBCUTANEOUS at 16:30

## 2021-12-31 RX ADMIN — METFORMIN HYDROCHLORIDE SCH MG: 500 TABLET, FILM COATED ORAL at 17:00

## 2021-12-31 RX ADMIN — CARVEDILOL SCH MG: 12.5 TABLET, FILM COATED ORAL at 16:23

## 2021-12-31 RX ADMIN — Medication SCH MG: at 21:00

## 2021-12-31 RX ADMIN — ASPIRIN 81 MG CHEWABLE TABLET SCH MG: 81 TABLET CHEWABLE at 09:01

## 2021-12-31 RX ADMIN — CARVEDILOL SCH MG: 12.5 TABLET, FILM COATED ORAL at 06:00

## 2021-12-31 RX ADMIN — INSULIN LISPRO SCH UNIT: 100 INJECTION, SOLUTION INTRAVENOUS; SUBCUTANEOUS at 20:31

## 2021-12-31 RX ADMIN — IPRATROPIUM BROMIDE AND ALBUTEROL SULFATE SCH ML: .5; 2.5 SOLUTION RESPIRATORY (INHALATION) at 18:45

## 2021-12-31 RX ADMIN — INSULIN LISPRO SCH UNIT: 100 INJECTION, SOLUTION INTRAVENOUS; SUBCUTANEOUS at 07:30

## 2021-12-31 RX ADMIN — SODIUM CHLORIDE PRN MG: 900 INJECTION INTRAVENOUS at 04:24

## 2021-12-31 RX ADMIN — SODIUM CHLORIDE PRN MG: 900 INJECTION INTRAVENOUS at 00:30

## 2021-12-31 RX ADMIN — ENOXAPARIN SODIUM SCH MG: 40 INJECTION SUBCUTANEOUS at 21:00

## 2021-12-31 RX ADMIN — INSULIN LISPRO SCH UNIT: 100 INJECTION, SOLUTION INTRAVENOUS; SUBCUTANEOUS at 12:04

## 2021-12-31 RX ADMIN — METFORMIN HYDROCHLORIDE SCH MG: 500 TABLET, FILM COATED ORAL at 09:01

## 2022-01-01 VITALS — DIASTOLIC BLOOD PRESSURE: 73 MMHG | SYSTOLIC BLOOD PRESSURE: 122 MMHG

## 2022-01-01 VITALS — SYSTOLIC BLOOD PRESSURE: 108 MMHG | DIASTOLIC BLOOD PRESSURE: 70 MMHG

## 2022-01-01 VITALS — DIASTOLIC BLOOD PRESSURE: 59 MMHG | SYSTOLIC BLOOD PRESSURE: 95 MMHG

## 2022-01-01 VITALS — SYSTOLIC BLOOD PRESSURE: 93 MMHG | DIASTOLIC BLOOD PRESSURE: 59 MMHG

## 2022-01-01 VITALS — SYSTOLIC BLOOD PRESSURE: 99 MMHG | DIASTOLIC BLOOD PRESSURE: 61 MMHG

## 2022-01-01 VITALS — DIASTOLIC BLOOD PRESSURE: 59 MMHG | SYSTOLIC BLOOD PRESSURE: 90 MMHG

## 2022-01-01 VITALS — DIASTOLIC BLOOD PRESSURE: 60 MMHG | SYSTOLIC BLOOD PRESSURE: 90 MMHG

## 2022-01-01 VITALS — SYSTOLIC BLOOD PRESSURE: 88 MMHG | DIASTOLIC BLOOD PRESSURE: 54 MMHG

## 2022-01-01 VITALS — SYSTOLIC BLOOD PRESSURE: 129 MMHG | DIASTOLIC BLOOD PRESSURE: 79 MMHG

## 2022-01-01 VITALS — SYSTOLIC BLOOD PRESSURE: 113 MMHG | DIASTOLIC BLOOD PRESSURE: 62 MMHG

## 2022-01-01 VITALS — DIASTOLIC BLOOD PRESSURE: 68 MMHG | SYSTOLIC BLOOD PRESSURE: 122 MMHG

## 2022-01-01 VITALS — DIASTOLIC BLOOD PRESSURE: 88 MMHG | SYSTOLIC BLOOD PRESSURE: 113 MMHG

## 2022-01-01 VITALS — SYSTOLIC BLOOD PRESSURE: 92 MMHG | DIASTOLIC BLOOD PRESSURE: 68 MMHG

## 2022-01-01 VITALS — SYSTOLIC BLOOD PRESSURE: 83 MMHG | DIASTOLIC BLOOD PRESSURE: 56 MMHG

## 2022-01-01 VITALS — DIASTOLIC BLOOD PRESSURE: 68 MMHG | SYSTOLIC BLOOD PRESSURE: 95 MMHG

## 2022-01-01 VITALS — SYSTOLIC BLOOD PRESSURE: 102 MMHG | DIASTOLIC BLOOD PRESSURE: 67 MMHG

## 2022-01-01 VITALS — SYSTOLIC BLOOD PRESSURE: 89 MMHG | DIASTOLIC BLOOD PRESSURE: 58 MMHG

## 2022-01-01 VITALS — DIASTOLIC BLOOD PRESSURE: 67 MMHG | SYSTOLIC BLOOD PRESSURE: 109 MMHG

## 2022-01-01 VITALS — DIASTOLIC BLOOD PRESSURE: 77 MMHG | SYSTOLIC BLOOD PRESSURE: 126 MMHG

## 2022-01-01 VITALS — DIASTOLIC BLOOD PRESSURE: 52 MMHG | SYSTOLIC BLOOD PRESSURE: 71 MMHG

## 2022-01-01 VITALS — DIASTOLIC BLOOD PRESSURE: 67 MMHG | SYSTOLIC BLOOD PRESSURE: 100 MMHG

## 2022-01-01 VITALS — DIASTOLIC BLOOD PRESSURE: 58 MMHG | SYSTOLIC BLOOD PRESSURE: 94 MMHG

## 2022-01-01 VITALS — SYSTOLIC BLOOD PRESSURE: 120 MMHG | DIASTOLIC BLOOD PRESSURE: 81 MMHG

## 2022-01-01 VITALS — SYSTOLIC BLOOD PRESSURE: 115 MMHG | DIASTOLIC BLOOD PRESSURE: 71 MMHG

## 2022-01-01 VITALS — SYSTOLIC BLOOD PRESSURE: 89 MMHG | DIASTOLIC BLOOD PRESSURE: 56 MMHG

## 2022-01-01 LAB
ANION GAP SERPL CALC-SCNC: 13.5 MMOL/L (ref 8–16)
BASOPHILS # BLD AUTO: 0 10*3/UL (ref 0–0.1)
BASOPHILS NFR BLD AUTO: 0.2 % (ref 0–1)
BUN SERPL-MCNC: 28 MG/DL (ref 7–26)
BUN/CREAT SERPL: 16 (ref 6–25)
CALCIUM SERPL-MCNC: 8.4 MG/DL (ref 8.4–10.2)
CHLORIDE SERPL-SCNC: 89 MMOL/L (ref 98–107)
CK MB SERPL-MCNC: 2.1 NG/ML (ref 0–5)
CK SERPL-CCNC: 202 IU/L (ref 30–200)
CO2 SERPL-SCNC: 34 MMOL/L (ref 22–29)
DEPRECATED NEUTROPHILS # BLD AUTO: 3.9 10*3/UL (ref 2.1–6.9)
EGFRCR SERPLBLD CKD-EPI 2021: 40 ML/MIN (ref 60–?)
EOSINOPHIL # BLD AUTO: 0.1 10*3/UL (ref 0–0.4)
EOSINOPHIL NFR BLD AUTO: 2.6 % (ref 0–6)
ERYTHROCYTE [DISTWIDTH] IN CORD BLOOD: 13.9 % (ref 11.7–14.4)
GLUCOSE SERPLBLD-MCNC: 113 MG/DL (ref 74–118)
HCT VFR BLD AUTO: 32.1 % (ref 38.2–49.6)
HGB BLD-MCNC: 10 G/DL (ref 14–18)
LYMPHOCYTES # BLD: 0.6 10*3/UL (ref 1–3.2)
LYMPHOCYTES NFR BLD AUTO: 11.5 % (ref 18–39.1)
MCH RBC QN AUTO: 29.2 PG (ref 28–32)
MCHC RBC AUTO-ENTMCNC: 31.2 G/DL (ref 31–35)
MCV RBC AUTO: 93.6 FL (ref 81–99)
MONOCYTES # BLD AUTO: 0.6 10*3/UL (ref 0.2–0.8)
MONOCYTES NFR BLD AUTO: 11.5 % (ref 4.4–11.3)
NEUTS SEG NFR BLD AUTO: 74 % (ref 38.7–80)
PLATELET # BLD AUTO: 237 X10E3/UL (ref 140–360)
POTASSIUM SERPL-SCNC: 3.5 MMOL/L (ref 3.5–5.1)
RBC # BLD AUTO: 3.43 X10E6/UL (ref 4.3–5.7)
SODIUM SERPL-SCNC: 133 MMOL/L (ref 136–145)

## 2022-01-01 RX ADMIN — Medication SCH MG: at 07:30

## 2022-01-01 RX ADMIN — AMPICILLIN SODIUM AND SULBACTAM SODIUM SCH MLS/HR: 1; .5 INJECTION, POWDER, FOR SOLUTION INTRAMUSCULAR; INTRAVENOUS at 17:37

## 2022-01-01 RX ADMIN — Medication SCH MG: at 16:00

## 2022-01-01 RX ADMIN — AMPICILLIN SODIUM AND SULBACTAM SODIUM SCH MLS/HR: 1; .5 INJECTION, POWDER, FOR SOLUTION INTRAMUSCULAR; INTRAVENOUS at 11:52

## 2022-01-01 RX ADMIN — ENOXAPARIN SODIUM SCH MG: 40 INJECTION SUBCUTANEOUS at 08:52

## 2022-01-01 RX ADMIN — IPRATROPIUM BROMIDE AND ALBUTEROL SULFATE SCH ML: .5; 2.5 SOLUTION RESPIRATORY (INHALATION) at 18:54

## 2022-01-01 RX ADMIN — ENOXAPARIN SODIUM SCH MG: 40 INJECTION SUBCUTANEOUS at 21:13

## 2022-01-01 RX ADMIN — ASPIRIN 81 MG CHEWABLE TABLET SCH MG: 81 TABLET CHEWABLE at 08:37

## 2022-01-01 RX ADMIN — IPRATROPIUM BROMIDE AND ALBUTEROL SULFATE SCH ML: .5; 2.5 SOLUTION RESPIRATORY (INHALATION) at 01:15

## 2022-01-01 RX ADMIN — METFORMIN HYDROCHLORIDE SCH MG: 500 TABLET, FILM COATED ORAL at 07:38

## 2022-01-01 RX ADMIN — INSULIN LISPRO SCH UNIT: 100 INJECTION, SOLUTION INTRAVENOUS; SUBCUTANEOUS at 12:22

## 2022-01-01 RX ADMIN — INSULIN LISPRO SCH UNIT: 100 INJECTION, SOLUTION INTRAVENOUS; SUBCUTANEOUS at 07:30

## 2022-01-01 RX ADMIN — Medication SCH MG: at 08:37

## 2022-01-01 RX ADMIN — INSULIN LISPRO SCH UNIT: 100 INJECTION, SOLUTION INTRAVENOUS; SUBCUTANEOUS at 20:19

## 2022-01-01 RX ADMIN — Medication SCH MG: at 18:54

## 2022-01-01 RX ADMIN — IPRATROPIUM BROMIDE AND ALBUTEROL SULFATE SCH ML: .5; 2.5 SOLUTION RESPIRATORY (INHALATION) at 13:00

## 2022-01-01 RX ADMIN — IPRATROPIUM BROMIDE AND ALBUTEROL SULFATE SCH ML: .5; 2.5 SOLUTION RESPIRATORY (INHALATION) at 07:30

## 2022-01-01 RX ADMIN — INSULIN LISPRO SCH UNIT: 100 INJECTION, SOLUTION INTRAVENOUS; SUBCUTANEOUS at 16:15

## 2022-01-02 VITALS — DIASTOLIC BLOOD PRESSURE: 82 MMHG | SYSTOLIC BLOOD PRESSURE: 140 MMHG

## 2022-01-02 VITALS — SYSTOLIC BLOOD PRESSURE: 114 MMHG | DIASTOLIC BLOOD PRESSURE: 67 MMHG

## 2022-01-02 VITALS — DIASTOLIC BLOOD PRESSURE: 70 MMHG | SYSTOLIC BLOOD PRESSURE: 102 MMHG

## 2022-01-02 VITALS — DIASTOLIC BLOOD PRESSURE: 64 MMHG | SYSTOLIC BLOOD PRESSURE: 86 MMHG

## 2022-01-02 VITALS — SYSTOLIC BLOOD PRESSURE: 111 MMHG | DIASTOLIC BLOOD PRESSURE: 53 MMHG

## 2022-01-02 VITALS — SYSTOLIC BLOOD PRESSURE: 117 MMHG | DIASTOLIC BLOOD PRESSURE: 74 MMHG

## 2022-01-02 VITALS — SYSTOLIC BLOOD PRESSURE: 147 MMHG | DIASTOLIC BLOOD PRESSURE: 92 MMHG

## 2022-01-02 VITALS — DIASTOLIC BLOOD PRESSURE: 81 MMHG | SYSTOLIC BLOOD PRESSURE: 143 MMHG

## 2022-01-02 VITALS — DIASTOLIC BLOOD PRESSURE: 96 MMHG | SYSTOLIC BLOOD PRESSURE: 147 MMHG

## 2022-01-02 VITALS — DIASTOLIC BLOOD PRESSURE: 87 MMHG | SYSTOLIC BLOOD PRESSURE: 143 MMHG

## 2022-01-02 VITALS — SYSTOLIC BLOOD PRESSURE: 99 MMHG | DIASTOLIC BLOOD PRESSURE: 70 MMHG

## 2022-01-02 VITALS — SYSTOLIC BLOOD PRESSURE: 129 MMHG | DIASTOLIC BLOOD PRESSURE: 87 MMHG

## 2022-01-02 VITALS — SYSTOLIC BLOOD PRESSURE: 93 MMHG | DIASTOLIC BLOOD PRESSURE: 65 MMHG

## 2022-01-02 VITALS — SYSTOLIC BLOOD PRESSURE: 106 MMHG | DIASTOLIC BLOOD PRESSURE: 63 MMHG

## 2022-01-02 VITALS — DIASTOLIC BLOOD PRESSURE: 63 MMHG | SYSTOLIC BLOOD PRESSURE: 106 MMHG

## 2022-01-02 VITALS — DIASTOLIC BLOOD PRESSURE: 81 MMHG | SYSTOLIC BLOOD PRESSURE: 150 MMHG

## 2022-01-02 VITALS — DIASTOLIC BLOOD PRESSURE: 84 MMHG | SYSTOLIC BLOOD PRESSURE: 134 MMHG

## 2022-01-02 VITALS — SYSTOLIC BLOOD PRESSURE: 119 MMHG | DIASTOLIC BLOOD PRESSURE: 72 MMHG

## 2022-01-02 VITALS — SYSTOLIC BLOOD PRESSURE: 129 MMHG | DIASTOLIC BLOOD PRESSURE: 91 MMHG

## 2022-01-02 VITALS — SYSTOLIC BLOOD PRESSURE: 100 MMHG | DIASTOLIC BLOOD PRESSURE: 73 MMHG

## 2022-01-02 VITALS — SYSTOLIC BLOOD PRESSURE: 133 MMHG | DIASTOLIC BLOOD PRESSURE: 85 MMHG

## 2022-01-02 VITALS — DIASTOLIC BLOOD PRESSURE: 85 MMHG | SYSTOLIC BLOOD PRESSURE: 133 MMHG

## 2022-01-02 VITALS — SYSTOLIC BLOOD PRESSURE: 147 MMHG | DIASTOLIC BLOOD PRESSURE: 95 MMHG

## 2022-01-02 VITALS — SYSTOLIC BLOOD PRESSURE: 128 MMHG | DIASTOLIC BLOOD PRESSURE: 75 MMHG

## 2022-01-02 LAB
ALBUMIN SERPL-MCNC: 2.8 G/DL (ref 3.5–5)
ALBUMIN/GLOB SERPL: 0.9 {RATIO} (ref 0.8–2)
ALP SERPL-CCNC: 54 IU/L (ref 40–150)
ALT SERPL-CCNC: 19 IU/L (ref 0–55)
ANION GAP SERPL CALC-SCNC: 11 MMOL/L (ref 8–16)
BASOPHILS # BLD AUTO: 0 10*3/UL (ref 0–0.1)
BASOPHILS NFR BLD AUTO: 0.4 % (ref 0–1)
BUN SERPL-MCNC: 17 MG/DL (ref 7–26)
BUN/CREAT SERPL: 14 (ref 6–25)
CALCIUM SERPL-MCNC: 8.2 MG/DL (ref 8.4–10.2)
CHLORIDE SERPL-SCNC: 94 MMOL/L (ref 98–107)
CO2 SERPL-SCNC: 37 MMOL/L (ref 22–29)
DEPRECATED INR PLAS: 0.98
DEPRECATED NEUTROPHILS # BLD AUTO: 3.8 10*3/UL (ref 2.1–6.9)
EGFRCR SERPLBLD CKD-EPI 2021: 61 ML/MIN (ref 60–?)
EOSINOPHIL # BLD AUTO: 0.1 10*3/UL (ref 0–0.4)
EOSINOPHIL NFR BLD AUTO: 2.1 % (ref 0–6)
ERYTHROCYTE [DISTWIDTH] IN CORD BLOOD: 13.6 % (ref 11.7–14.4)
GLOBULIN PLAS-MCNC: 3.1 G/DL (ref 2.3–3.5)
GLUCOSE SERPLBLD-MCNC: 122 MG/DL (ref 74–118)
HCT VFR BLD AUTO: 31.4 % (ref 38.2–49.6)
HGB BLD-MCNC: 9.7 G/DL (ref 14–18)
LYMPHOCYTES # BLD: 0.8 10*3/UL (ref 1–3.2)
LYMPHOCYTES NFR BLD AUTO: 15.2 % (ref 18–39.1)
MCH RBC QN AUTO: 28.9 PG (ref 28–32)
MCHC RBC AUTO-ENTMCNC: 30.9 G/DL (ref 31–35)
MCV RBC AUTO: 93.5 FL (ref 81–99)
MONOCYTES # BLD AUTO: 0.6 10*3/UL (ref 0.2–0.8)
MONOCYTES NFR BLD AUTO: 11.1 % (ref 4.4–11.3)
NEUTS SEG NFR BLD AUTO: 70.8 % (ref 38.7–80)
PLATELET # BLD AUTO: 215 X10E3/UL (ref 140–360)
POTASSIUM SERPL-SCNC: 3 MMOL/L (ref 3.5–5.1)
PROTHROMBIN TIME: 13.8 SECONDS (ref 11.9–14.5)
RBC # BLD AUTO: 3.36 X10E6/UL (ref 4.3–5.7)
SODIUM SERPL-SCNC: 139 MMOL/L (ref 136–145)

## 2022-01-02 RX ADMIN — AMPICILLIN SODIUM AND SULBACTAM SODIUM SCH MLS/HR: 1; .5 INJECTION, POWDER, FOR SOLUTION INTRAMUSCULAR; INTRAVENOUS at 00:09

## 2022-01-02 RX ADMIN — Medication SCH MG: at 18:50

## 2022-01-02 RX ADMIN — IPRATROPIUM BROMIDE AND ALBUTEROL SULFATE SCH ML: .5; 2.5 SOLUTION RESPIRATORY (INHALATION) at 18:50

## 2022-01-02 RX ADMIN — Medication SCH MG: at 07:35

## 2022-01-02 RX ADMIN — IPRATROPIUM BROMIDE AND ALBUTEROL SULFATE SCH ML: .5; 2.5 SOLUTION RESPIRATORY (INHALATION) at 13:00

## 2022-01-02 RX ADMIN — INSULIN LISPRO SCH UNIT: 100 INJECTION, SOLUTION INTRAVENOUS; SUBCUTANEOUS at 12:30

## 2022-01-02 RX ADMIN — AMPICILLIN SODIUM AND SULBACTAM SODIUM SCH MLS/HR: 1; .5 INJECTION, POWDER, FOR SOLUTION INTRAMUSCULAR; INTRAVENOUS at 06:13

## 2022-01-02 RX ADMIN — ASPIRIN 81 MG CHEWABLE TABLET SCH MG: 81 TABLET CHEWABLE at 09:21

## 2022-01-02 RX ADMIN — INSULIN LISPRO SCH UNIT: 100 INJECTION, SOLUTION INTRAVENOUS; SUBCUTANEOUS at 20:58

## 2022-01-02 RX ADMIN — ENOXAPARIN SODIUM SCH MG: 40 INJECTION SUBCUTANEOUS at 09:21

## 2022-01-02 RX ADMIN — AMPICILLIN SODIUM AND SULBACTAM SODIUM SCH MLS/HR: 1; .5 INJECTION, POWDER, FOR SOLUTION INTRAMUSCULAR; INTRAVENOUS at 12:35

## 2022-01-02 RX ADMIN — INSULIN LISPRO SCH UNIT: 100 INJECTION, SOLUTION INTRAVENOUS; SUBCUTANEOUS at 16:30

## 2022-01-02 RX ADMIN — Medication SCH MG: at 15:00

## 2022-01-02 RX ADMIN — IPRATROPIUM BROMIDE AND ALBUTEROL SULFATE SCH ML: .5; 2.5 SOLUTION RESPIRATORY (INHALATION) at 01:00

## 2022-01-02 RX ADMIN — AMPICILLIN SODIUM AND SULBACTAM SODIUM SCH MLS/HR: 1; .5 INJECTION, POWDER, FOR SOLUTION INTRAMUSCULAR; INTRAVENOUS at 23:50

## 2022-01-02 RX ADMIN — AMPICILLIN SODIUM AND SULBACTAM SODIUM SCH MLS/HR: 1; .5 INJECTION, POWDER, FOR SOLUTION INTRAMUSCULAR; INTRAVENOUS at 18:30

## 2022-01-02 RX ADMIN — INSULIN LISPRO SCH UNIT: 100 INJECTION, SOLUTION INTRAVENOUS; SUBCUTANEOUS at 07:30

## 2022-01-02 RX ADMIN — IPRATROPIUM BROMIDE AND ALBUTEROL SULFATE SCH ML: .5; 2.5 SOLUTION RESPIRATORY (INHALATION) at 07:35

## 2022-01-02 RX ADMIN — ENOXAPARIN SODIUM SCH MG: 40 INJECTION SUBCUTANEOUS at 20:58

## 2022-01-02 RX ADMIN — IPRATROPIUM BROMIDE AND ALBUTEROL SULFATE SCH ML: .5; 2.5 SOLUTION RESPIRATORY (INHALATION) at 23:50

## 2022-01-03 VITALS — DIASTOLIC BLOOD PRESSURE: 87 MMHG | SYSTOLIC BLOOD PRESSURE: 147 MMHG

## 2022-01-03 VITALS — SYSTOLIC BLOOD PRESSURE: 144 MMHG | DIASTOLIC BLOOD PRESSURE: 103 MMHG

## 2022-01-03 VITALS — DIASTOLIC BLOOD PRESSURE: 97 MMHG | SYSTOLIC BLOOD PRESSURE: 154 MMHG

## 2022-01-03 VITALS — DIASTOLIC BLOOD PRESSURE: 77 MMHG | SYSTOLIC BLOOD PRESSURE: 135 MMHG

## 2022-01-03 VITALS — SYSTOLIC BLOOD PRESSURE: 154 MMHG | DIASTOLIC BLOOD PRESSURE: 107 MMHG

## 2022-01-03 VITALS — DIASTOLIC BLOOD PRESSURE: 87 MMHG | SYSTOLIC BLOOD PRESSURE: 128 MMHG

## 2022-01-03 VITALS — SYSTOLIC BLOOD PRESSURE: 150 MMHG | DIASTOLIC BLOOD PRESSURE: 111 MMHG

## 2022-01-03 VITALS — DIASTOLIC BLOOD PRESSURE: 80 MMHG | SYSTOLIC BLOOD PRESSURE: 148 MMHG

## 2022-01-03 VITALS — SYSTOLIC BLOOD PRESSURE: 148 MMHG | DIASTOLIC BLOOD PRESSURE: 92 MMHG

## 2022-01-03 VITALS — SYSTOLIC BLOOD PRESSURE: 150 MMHG | DIASTOLIC BLOOD PRESSURE: 85 MMHG

## 2022-01-03 VITALS — SYSTOLIC BLOOD PRESSURE: 156 MMHG | DIASTOLIC BLOOD PRESSURE: 98 MMHG

## 2022-01-03 RX ADMIN — IPRATROPIUM BROMIDE AND ALBUTEROL SULFATE SCH ML: .5; 2.5 SOLUTION RESPIRATORY (INHALATION) at 21:40

## 2022-01-03 RX ADMIN — Medication SCH MG: at 23:00

## 2022-01-03 RX ADMIN — FAMOTIDINE SCH MG: 20 TABLET, FILM COATED ORAL at 22:00

## 2022-01-03 RX ADMIN — ASPIRIN 81 MG CHEWABLE TABLET SCH MG: 81 TABLET CHEWABLE at 09:01

## 2022-01-03 RX ADMIN — AMPICILLIN SODIUM AND SULBACTAM SODIUM SCH MLS/HR: 1; .5 INJECTION, POWDER, FOR SOLUTION INTRAMUSCULAR; INTRAVENOUS at 05:48

## 2022-01-03 RX ADMIN — AMPICILLIN SODIUM AND SULBACTAM SODIUM SCH MLS/HR: 1; .5 INJECTION, POWDER, FOR SOLUTION INTRAMUSCULAR; INTRAVENOUS at 17:58

## 2022-01-03 RX ADMIN — ENOXAPARIN SODIUM SCH MG: 40 INJECTION SUBCUTANEOUS at 09:02

## 2022-01-03 RX ADMIN — INSULIN LISPRO SCH UNIT: 100 INJECTION, SOLUTION INTRAVENOUS; SUBCUTANEOUS at 07:30

## 2022-01-03 RX ADMIN — IPRATROPIUM BROMIDE AND ALBUTEROL SULFATE SCH ML: .5; 2.5 SOLUTION RESPIRATORY (INHALATION) at 06:45

## 2022-01-03 RX ADMIN — Medication SCH MG: at 06:45

## 2022-01-03 RX ADMIN — Medication SCH MG: at 14:37

## 2022-01-03 RX ADMIN — IPRATROPIUM BROMIDE AND ALBUTEROL SULFATE SCH ML: .5; 2.5 SOLUTION RESPIRATORY (INHALATION) at 14:37

## 2022-01-03 RX ADMIN — INSULIN LISPRO SCH UNIT: 100 INJECTION, SOLUTION INTRAVENOUS; SUBCUTANEOUS at 16:30

## 2022-01-03 RX ADMIN — Medication SCH MG: at 21:40

## 2022-01-03 RX ADMIN — INSULIN LISPRO SCH UNIT: 100 INJECTION, SOLUTION INTRAVENOUS; SUBCUTANEOUS at 21:09

## 2022-01-03 RX ADMIN — AMPICILLIN SODIUM AND SULBACTAM SODIUM SCH MLS/HR: 1; .5 INJECTION, POWDER, FOR SOLUTION INTRAMUSCULAR; INTRAVENOUS at 12:26

## 2022-01-03 RX ADMIN — INSULIN LISPRO SCH UNIT: 100 INJECTION, SOLUTION INTRAVENOUS; SUBCUTANEOUS at 11:30

## 2022-01-03 RX ADMIN — ENOXAPARIN SODIUM SCH MG: 40 INJECTION SUBCUTANEOUS at 21:00

## 2022-01-04 VITALS — DIASTOLIC BLOOD PRESSURE: 87 MMHG | SYSTOLIC BLOOD PRESSURE: 131 MMHG

## 2022-01-04 VITALS — DIASTOLIC BLOOD PRESSURE: 96 MMHG | SYSTOLIC BLOOD PRESSURE: 149 MMHG

## 2022-01-04 VITALS — DIASTOLIC BLOOD PRESSURE: 98 MMHG | SYSTOLIC BLOOD PRESSURE: 137 MMHG

## 2022-01-04 VITALS — SYSTOLIC BLOOD PRESSURE: 148 MMHG | DIASTOLIC BLOOD PRESSURE: 93 MMHG

## 2022-01-04 VITALS — SYSTOLIC BLOOD PRESSURE: 139 MMHG | DIASTOLIC BLOOD PRESSURE: 99 MMHG

## 2022-01-04 LAB
CO2 BLDCOA CALC-SCNC: 40 MMOL/L
HCO3 BLDA-SCNC: 38 MMOL/L (ref 22–26)
PCO2 BLDA: 109 MMHG (ref 80–105)
PCO2 BLDA: 51 MMHG (ref 35–45)
PH BLDA: 7.48 [PH] (ref 7.35–7.45)
SAO2 % BLDA: 98 % (ref 95–98)

## 2022-01-04 RX ADMIN — AMPICILLIN SODIUM AND SULBACTAM SODIUM SCH MLS/HR: 1; .5 INJECTION, POWDER, FOR SOLUTION INTRAMUSCULAR; INTRAVENOUS at 06:00

## 2022-01-04 RX ADMIN — FAMOTIDINE SCH MG: 20 TABLET, FILM COATED ORAL at 08:57

## 2022-01-04 RX ADMIN — INSULIN LISPRO SCH UNIT: 100 INJECTION, SOLUTION INTRAVENOUS; SUBCUTANEOUS at 16:30

## 2022-01-04 RX ADMIN — Medication SCH MG: at 07:45

## 2022-01-04 RX ADMIN — ENOXAPARIN SODIUM SCH MG: 40 INJECTION SUBCUTANEOUS at 08:57

## 2022-01-04 RX ADMIN — AMPICILLIN SODIUM AND SULBACTAM SODIUM SCH MLS/HR: 1; .5 INJECTION, POWDER, FOR SOLUTION INTRAMUSCULAR; INTRAVENOUS at 18:00

## 2022-01-04 RX ADMIN — ASPIRIN 81 MG CHEWABLE TABLET SCH MG: 81 TABLET CHEWABLE at 08:57

## 2022-01-04 RX ADMIN — IPRATROPIUM BROMIDE AND ALBUTEROL SULFATE SCH ML: .5; 2.5 SOLUTION RESPIRATORY (INHALATION) at 15:20

## 2022-01-04 RX ADMIN — INSULIN LISPRO SCH UNIT: 100 INJECTION, SOLUTION INTRAVENOUS; SUBCUTANEOUS at 11:58

## 2022-01-04 RX ADMIN — AMPICILLIN SODIUM AND SULBACTAM SODIUM SCH MLS/HR: 1; .5 INJECTION, POWDER, FOR SOLUTION INTRAMUSCULAR; INTRAVENOUS at 11:56

## 2022-01-04 RX ADMIN — Medication SCH MG: at 15:20

## 2022-01-04 RX ADMIN — IPRATROPIUM BROMIDE AND ALBUTEROL SULFATE SCH ML: .5; 2.5 SOLUTION RESPIRATORY (INHALATION) at 07:45

## 2022-01-04 RX ADMIN — FAMOTIDINE SCH MG: 20 TABLET, FILM COATED ORAL at 17:00

## 2022-01-04 RX ADMIN — INSULIN LISPRO SCH UNIT: 100 INJECTION, SOLUTION INTRAVENOUS; SUBCUTANEOUS at 07:30

## 2022-01-04 RX ADMIN — AMPICILLIN SODIUM AND SULBACTAM SODIUM SCH MLS/HR: 1; .5 INJECTION, POWDER, FOR SOLUTION INTRAMUSCULAR; INTRAVENOUS at 00:00

## 2022-01-22 ENCOUNTER — HOSPITAL ENCOUNTER (INPATIENT)
Dept: HOSPITAL 88 - ER | Age: 72
LOS: 2 days | Discharge: HOME | DRG: 287 | End: 2022-01-24
Attending: INTERNAL MEDICINE | Admitting: INTERNAL MEDICINE
Payer: MEDICARE

## 2022-01-22 VITALS — DIASTOLIC BLOOD PRESSURE: 92 MMHG | SYSTOLIC BLOOD PRESSURE: 141 MMHG

## 2022-01-22 VITALS — BODY MASS INDEX: 41.83 KG/M2 | HEIGHT: 64 IN | WEIGHT: 245 LBS

## 2022-01-22 DIAGNOSIS — E78.00: ICD-10-CM

## 2022-01-22 DIAGNOSIS — Z99.81: ICD-10-CM

## 2022-01-22 DIAGNOSIS — J96.10: ICD-10-CM

## 2022-01-22 DIAGNOSIS — I10: ICD-10-CM

## 2022-01-22 DIAGNOSIS — Z85.46: ICD-10-CM

## 2022-01-22 DIAGNOSIS — E11.9: ICD-10-CM

## 2022-01-22 DIAGNOSIS — Z20.822: ICD-10-CM

## 2022-01-22 DIAGNOSIS — I25.10: ICD-10-CM

## 2022-01-22 DIAGNOSIS — E66.2: ICD-10-CM

## 2022-01-22 DIAGNOSIS — J47.9: ICD-10-CM

## 2022-01-22 DIAGNOSIS — Z90.79: ICD-10-CM

## 2022-01-22 DIAGNOSIS — R07.89: Primary | ICD-10-CM

## 2022-01-22 LAB
ALBUMIN SERPL-MCNC: 3.7 G/DL (ref 3.5–5)
ALBUMIN/GLOB SERPL: 0.9 {RATIO} (ref 0.8–2)
ALP SERPL-CCNC: 78 IU/L (ref 40–150)
ALT SERPL-CCNC: 28 IU/L (ref 0–55)
ANION GAP SERPL CALC-SCNC: 12.8 MMOL/L (ref 8–16)
BASOPHILS # BLD AUTO: 0 10*3/UL (ref 0–0.1)
BASOPHILS NFR BLD AUTO: 0.2 % (ref 0–1)
BUN SERPL-MCNC: 19 MG/DL (ref 7–26)
BUN/CREAT SERPL: 16 (ref 6–25)
CALCIUM SERPL-MCNC: 9.8 MG/DL (ref 8.4–10.2)
CHLORIDE SERPL-SCNC: 93 MMOL/L (ref 98–107)
CK MB SERPL-MCNC: 1.6 NG/ML (ref 0–5)
CK SERPL-CCNC: 103 IU/L (ref 30–200)
CO2 SERPL-SCNC: 38 MMOL/L (ref 22–29)
DEPRECATED APTT PLAS QN: 28.5 SECONDS (ref 23.8–35.5)
DEPRECATED INR PLAS: 0.84
DEPRECATED NEUTROPHILS # BLD AUTO: 3.3 10*3/UL (ref 2.1–6.9)
EGFRCR SERPLBLD CKD-EPI 2021: 61 ML/MIN (ref 60–?)
EOSINOPHIL # BLD AUTO: 0.3 10*3/UL (ref 0–0.4)
EOSINOPHIL NFR BLD AUTO: 5.7 % (ref 0–6)
ERYTHROCYTE [DISTWIDTH] IN CORD BLOOD: 13.9 % (ref 11.7–14.4)
GLOBULIN PLAS-MCNC: 4.2 G/DL (ref 2.3–3.5)
GLUCOSE SERPLBLD-MCNC: 91 MG/DL (ref 74–118)
HCT VFR BLD AUTO: 39.5 % (ref 38.2–49.6)
HGB BLD-MCNC: 12.4 G/DL (ref 14–18)
LYMPHOCYTES # BLD: 0.7 10*3/UL (ref 1–3.2)
LYMPHOCYTES NFR BLD AUTO: 15.6 % (ref 18–39.1)
MAGNESIUM SERPL-MCNC: 1.7 MG/DL (ref 1.3–2.1)
MCH RBC QN AUTO: 29.1 PG (ref 28–32)
MCHC RBC AUTO-ENTMCNC: 31.4 G/DL (ref 31–35)
MCV RBC AUTO: 92.7 FL (ref 81–99)
MONOCYTES # BLD AUTO: 0.4 10*3/UL (ref 0.2–0.8)
MONOCYTES NFR BLD AUTO: 8.9 % (ref 4.4–11.3)
NEUTS SEG NFR BLD AUTO: 69.4 % (ref 38.7–80)
PLATELET # BLD AUTO: 276 X10E3/UL (ref 140–360)
POTASSIUM SERPL-SCNC: 2.8 MMOL/L (ref 3.5–5.1)
PROTHROMBIN TIME: 12.1 SECONDS (ref 11.9–14.5)
RBC # BLD AUTO: 4.26 X10E6/UL (ref 4.3–5.7)
SODIUM SERPL-SCNC: 141 MMOL/L (ref 136–145)

## 2022-01-22 PROCEDURE — 85730 THROMBOPLASTIN TIME PARTIAL: CPT

## 2022-01-22 PROCEDURE — 93454 CORONARY ARTERY ANGIO S&I: CPT

## 2022-01-22 PROCEDURE — 94799 UNLISTED PULMONARY SVC/PX: CPT

## 2022-01-22 PROCEDURE — 82553 CREATINE MB FRACTION: CPT

## 2022-01-22 PROCEDURE — 93005 ELECTROCARDIOGRAM TRACING: CPT

## 2022-01-22 PROCEDURE — 99152 MOD SED SAME PHYS/QHP 5/>YRS: CPT

## 2022-01-22 PROCEDURE — 84484 ASSAY OF TROPONIN QUANT: CPT

## 2022-01-22 PROCEDURE — 93306 TTE W/DOPPLER COMPLETE: CPT

## 2022-01-22 PROCEDURE — 36415 COLL VENOUS BLD VENIPUNCTURE: CPT

## 2022-01-22 PROCEDURE — 83880 ASSAY OF NATRIURETIC PEPTIDE: CPT

## 2022-01-22 PROCEDURE — 82948 REAGENT STRIP/BLOOD GLUCOSE: CPT

## 2022-01-22 PROCEDURE — 80061 LIPID PANEL: CPT

## 2022-01-22 PROCEDURE — 96360 HYDRATION IV INFUSION INIT: CPT

## 2022-01-22 PROCEDURE — 82550 ASSAY OF CK (CPK): CPT

## 2022-01-22 PROCEDURE — 80048 BASIC METABOLIC PNL TOTAL CA: CPT

## 2022-01-22 PROCEDURE — 85610 PROTHROMBIN TIME: CPT

## 2022-01-22 PROCEDURE — 99284 EMERGENCY DEPT VISIT MOD MDM: CPT

## 2022-01-22 PROCEDURE — 80053 COMPREHEN METABOLIC PANEL: CPT

## 2022-01-22 PROCEDURE — 83735 ASSAY OF MAGNESIUM: CPT

## 2022-01-22 PROCEDURE — 71045 X-RAY EXAM CHEST 1 VIEW: CPT

## 2022-01-22 PROCEDURE — 85025 COMPLETE CBC W/AUTO DIFF WBC: CPT

## 2022-01-23 VITALS — DIASTOLIC BLOOD PRESSURE: 73 MMHG | SYSTOLIC BLOOD PRESSURE: 114 MMHG

## 2022-01-23 VITALS — SYSTOLIC BLOOD PRESSURE: 125 MMHG | DIASTOLIC BLOOD PRESSURE: 83 MMHG

## 2022-01-23 VITALS — SYSTOLIC BLOOD PRESSURE: 123 MMHG | DIASTOLIC BLOOD PRESSURE: 81 MMHG

## 2022-01-23 VITALS — DIASTOLIC BLOOD PRESSURE: 73 MMHG | SYSTOLIC BLOOD PRESSURE: 122 MMHG

## 2022-01-23 VITALS — DIASTOLIC BLOOD PRESSURE: 69 MMHG | SYSTOLIC BLOOD PRESSURE: 94 MMHG

## 2022-01-23 VITALS — SYSTOLIC BLOOD PRESSURE: 147 MMHG | DIASTOLIC BLOOD PRESSURE: 94 MMHG

## 2022-01-23 LAB
ANION GAP SERPL CALC-SCNC: 14.5 MMOL/L (ref 8–16)
BASOPHILS # BLD AUTO: 0 10*3/UL (ref 0–0.1)
BASOPHILS NFR BLD AUTO: 0.2 % (ref 0–1)
BUN SERPL-MCNC: 16 MG/DL (ref 7–26)
BUN/CREAT SERPL: 13 (ref 6–25)
CALCIUM SERPL-MCNC: 9.3 MG/DL (ref 8.4–10.2)
CHLORIDE SERPL-SCNC: 92 MMOL/L (ref 98–107)
CHOLEST SERPL-MCNC: 143 MD/DL (ref 0–199)
CHOLEST/HDLC SERPL: 2.9 {RATIO} (ref 3.9–4.7)
CK MB SERPL-MCNC: 1.9 NG/ML (ref 0–5)
CK MB SERPL-MCNC: 1.9 NG/ML (ref 0–5)
CK SERPL-CCNC: 103 IU/L (ref 30–200)
CK SERPL-CCNC: 108 IU/L (ref 30–200)
CO2 SERPL-SCNC: 38 MMOL/L (ref 22–29)
DEPRECATED NEUTROPHILS # BLD AUTO: 2.7 10*3/UL (ref 2.1–6.9)
EGFRCR SERPLBLD CKD-EPI 2021: 59 ML/MIN (ref 60–?)
EOSINOPHIL # BLD AUTO: 0.2 10*3/UL (ref 0–0.4)
EOSINOPHIL NFR BLD AUTO: 6 % (ref 0–6)
ERYTHROCYTE [DISTWIDTH] IN CORD BLOOD: 14.1 % (ref 11.7–14.4)
GLUCOSE SERPLBLD-MCNC: 190 MG/DL (ref 74–118)
HCT VFR BLD AUTO: 40.1 % (ref 38.2–49.6)
HDLC SERPL-MSCNC: 50 MG/DL (ref 40–60)
HGB BLD-MCNC: 12.1 G/DL (ref 14–18)
LDLC SERPL CALC-MCNC: 60 MG/DL (ref 60–130)
LYMPHOCYTES # BLD: 0.6 10*3/UL (ref 1–3.2)
LYMPHOCYTES NFR BLD AUTO: 15.2 % (ref 18–39.1)
MCH RBC QN AUTO: 29.1 PG (ref 28–32)
MCHC RBC AUTO-ENTMCNC: 30.2 G/DL (ref 31–35)
MCV RBC AUTO: 96.4 FL (ref 81–99)
MONOCYTES # BLD AUTO: 0.4 10*3/UL (ref 0.2–0.8)
MONOCYTES NFR BLD AUTO: 10.2 % (ref 4.4–11.3)
NEUTS SEG NFR BLD AUTO: 68.2 % (ref 38.7–80)
PLATELET # BLD AUTO: 225 X10E3/UL (ref 140–360)
POTASSIUM SERPL-SCNC: 3.5 MMOL/L (ref 3.5–5.1)
RBC # BLD AUTO: 4.16 X10E6/UL (ref 4.3–5.7)
SODIUM SERPL-SCNC: 141 MMOL/L (ref 136–145)
TRIGL SERPL-MCNC: 166 MG/DL (ref 0–149)

## 2022-01-23 RX ADMIN — PIOGLITAZONE SCH MG: 45 TABLET ORAL at 09:00

## 2022-01-23 RX ADMIN — GLIMEPIRIDE SCH MG: 2 TABLET ORAL at 16:40

## 2022-01-23 RX ADMIN — CARVEDILOL SCH MG: 12.5 TABLET, FILM COATED ORAL at 16:40

## 2022-01-23 RX ADMIN — Medication SCH MG: at 20:40

## 2022-01-23 RX ADMIN — SODIUM CHLORIDE SCH MLS/HR: 9 INJECTION, SOLUTION INTRAVENOUS at 16:33

## 2022-01-23 RX ADMIN — POTASSIUM CHLORIDE SCH MEQ: 1500 TABLET, EXTENDED RELEASE ORAL at 09:59

## 2022-01-23 RX ADMIN — GLIMEPIRIDE SCH MG: 2 TABLET ORAL at 09:59

## 2022-01-23 RX ADMIN — Medication SCH MG: at 15:26

## 2022-01-23 RX ADMIN — AMLODIPINE BESYLATE SCH MG: 10 TABLET ORAL at 09:59

## 2022-01-23 RX ADMIN — CARVEDILOL SCH MG: 12.5 TABLET, FILM COATED ORAL at 09:59

## 2022-01-23 RX ADMIN — ASPIRIN 81 MG CHEWABLE TABLET SCH MG: 81 TABLET CHEWABLE at 09:59

## 2022-01-23 RX ADMIN — INSULIN HUMAN SCH UNIT: 100 INJECTION, SOLUTION PARENTERAL at 20:41

## 2022-01-23 RX ADMIN — INSULIN HUMAN SCH UNIT: 100 INJECTION, SOLUTION PARENTERAL at 16:30

## 2022-01-23 RX ADMIN — Medication SCH MG: at 09:59

## 2022-01-24 VITALS — DIASTOLIC BLOOD PRESSURE: 86 MMHG | SYSTOLIC BLOOD PRESSURE: 130 MMHG

## 2022-01-24 VITALS — SYSTOLIC BLOOD PRESSURE: 113 MMHG | DIASTOLIC BLOOD PRESSURE: 72 MMHG

## 2022-01-24 VITALS — DIASTOLIC BLOOD PRESSURE: 80 MMHG | SYSTOLIC BLOOD PRESSURE: 135 MMHG

## 2022-01-24 VITALS — SYSTOLIC BLOOD PRESSURE: 108 MMHG | DIASTOLIC BLOOD PRESSURE: 72 MMHG

## 2022-01-24 VITALS — DIASTOLIC BLOOD PRESSURE: 72 MMHG | SYSTOLIC BLOOD PRESSURE: 112 MMHG

## 2022-01-24 VITALS — DIASTOLIC BLOOD PRESSURE: 70 MMHG | SYSTOLIC BLOOD PRESSURE: 114 MMHG

## 2022-01-24 VITALS — DIASTOLIC BLOOD PRESSURE: 72 MMHG | SYSTOLIC BLOOD PRESSURE: 104 MMHG

## 2022-01-24 VITALS — DIASTOLIC BLOOD PRESSURE: 72 MMHG | SYSTOLIC BLOOD PRESSURE: 108 MMHG

## 2022-01-24 VITALS — DIASTOLIC BLOOD PRESSURE: 87 MMHG | SYSTOLIC BLOOD PRESSURE: 137 MMHG

## 2022-01-24 VITALS — DIASTOLIC BLOOD PRESSURE: 58 MMHG | SYSTOLIC BLOOD PRESSURE: 110 MMHG

## 2022-01-24 VITALS — DIASTOLIC BLOOD PRESSURE: 65 MMHG | SYSTOLIC BLOOD PRESSURE: 104 MMHG

## 2022-01-24 LAB
ALBUMIN SERPL-MCNC: 3.1 G/DL (ref 3.5–5)
ALBUMIN/GLOB SERPL: 0.9 {RATIO} (ref 0.8–2)
ALP SERPL-CCNC: 58 IU/L (ref 40–150)
ALT SERPL-CCNC: 20 IU/L (ref 0–55)
ANION GAP SERPL CALC-SCNC: 9.1 MMOL/L (ref 8–16)
BASOPHILS # BLD AUTO: 0 10*3/UL (ref 0–0.1)
BASOPHILS NFR BLD AUTO: 0.5 % (ref 0–1)
BUN SERPL-MCNC: 18 MG/DL (ref 7–26)
BUN/CREAT SERPL: 18 (ref 6–25)
CALCIUM SERPL-MCNC: 9 MG/DL (ref 8.4–10.2)
CHLORIDE SERPL-SCNC: 96 MMOL/L (ref 98–107)
CO2 SERPL-SCNC: 40 MMOL/L (ref 22–29)
DEPRECATED NEUTROPHILS # BLD AUTO: 2.7 10*3/UL (ref 2.1–6.9)
EGFRCR SERPLBLD CKD-EPI 2021: 74 ML/MIN (ref 60–?)
EOSINOPHIL # BLD AUTO: 0.3 10*3/UL (ref 0–0.4)
EOSINOPHIL NFR BLD AUTO: 6.3 % (ref 0–6)
ERYTHROCYTE [DISTWIDTH] IN CORD BLOOD: 14 % (ref 11.7–14.4)
GLOBULIN PLAS-MCNC: 3.3 G/DL (ref 2.3–3.5)
GLUCOSE SERPLBLD-MCNC: 89 MG/DL (ref 74–118)
HCT VFR BLD AUTO: 36.8 % (ref 38.2–49.6)
HGB BLD-MCNC: 11.1 G/DL (ref 14–18)
LYMPHOCYTES # BLD: 0.8 10*3/UL (ref 1–3.2)
LYMPHOCYTES NFR BLD AUTO: 18 % (ref 18–39.1)
MCH RBC QN AUTO: 29.1 PG (ref 28–32)
MCHC RBC AUTO-ENTMCNC: 30.2 G/DL (ref 31–35)
MCV RBC AUTO: 96.6 FL (ref 81–99)
MONOCYTES # BLD AUTO: 0.5 10*3/UL (ref 0.2–0.8)
MONOCYTES NFR BLD AUTO: 11.7 % (ref 4.4–11.3)
NEUTS SEG NFR BLD AUTO: 63.5 % (ref 38.7–80)
PLATELET # BLD AUTO: 228 X10E3/UL (ref 140–360)
POTASSIUM SERPL-SCNC: 3.1 MMOL/L (ref 3.5–5.1)
RBC # BLD AUTO: 3.81 X10E6/UL (ref 4.3–5.7)
SODIUM SERPL-SCNC: 142 MMOL/L (ref 136–145)

## 2022-01-24 PROCEDURE — B2111ZZ FLUOROSCOPY OF MULTIPLE CORONARY ARTERIES USING LOW OSMOLAR CONTRAST: ICD-10-PCS | Performed by: INTERNAL MEDICINE

## 2022-01-24 PROCEDURE — 4A023N7 MEASUREMENT OF CARDIAC SAMPLING AND PRESSURE, LEFT HEART, PERCUTANEOUS APPROACH: ICD-10-PCS | Performed by: INTERNAL MEDICINE

## 2022-01-24 RX ADMIN — INSULIN HUMAN SCH UNIT: 100 INJECTION, SOLUTION PARENTERAL at 07:30

## 2022-01-24 RX ADMIN — POTASSIUM CHLORIDE SCH MEQ: 1500 TABLET, EXTENDED RELEASE ORAL at 09:00

## 2022-01-24 RX ADMIN — CARVEDILOL SCH MG: 12.5 TABLET, FILM COATED ORAL at 09:32

## 2022-01-24 RX ADMIN — ASPIRIN 81 MG CHEWABLE TABLET SCH MG: 81 TABLET CHEWABLE at 09:00

## 2022-01-24 RX ADMIN — SODIUM CHLORIDE SCH MLS/HR: 9 INJECTION, SOLUTION INTRAVENOUS at 05:00

## 2022-01-24 RX ADMIN — PIOGLITAZONE SCH MG: 45 TABLET ORAL at 09:00

## 2022-01-24 RX ADMIN — GLIMEPIRIDE SCH MG: 2 TABLET ORAL at 08:00

## 2022-01-24 RX ADMIN — AMLODIPINE BESYLATE SCH MG: 10 TABLET ORAL at 09:32

## 2022-01-24 RX ADMIN — Medication SCH MG: at 09:00
